# Patient Record
Sex: FEMALE | Race: WHITE | NOT HISPANIC OR LATINO | Employment: FULL TIME | URBAN - METROPOLITAN AREA
[De-identification: names, ages, dates, MRNs, and addresses within clinical notes are randomized per-mention and may not be internally consistent; named-entity substitution may affect disease eponyms.]

---

## 2017-11-14 ENCOUNTER — GENERIC CONVERSION - ENCOUNTER (OUTPATIENT)
Dept: OTHER | Facility: OTHER | Age: 42
End: 2017-11-14

## 2017-11-16 ENCOUNTER — ALLSCRIPTS OFFICE VISIT (OUTPATIENT)
Dept: OTHER | Facility: OTHER | Age: 42
End: 2017-11-16

## 2017-11-16 DIAGNOSIS — R10.13 EPIGASTRIC PAIN: ICD-10-CM

## 2017-11-16 DIAGNOSIS — M25.50 PAIN IN JOINT: ICD-10-CM

## 2017-11-16 DIAGNOSIS — O24.419 GESTATIONAL DIABETES MELLITUS IN PREGNANCY: ICD-10-CM

## 2017-11-16 DIAGNOSIS — K58.9 IRRITABLE BOWEL SYNDROME WITHOUT DIARRHEA: ICD-10-CM

## 2017-11-17 ENCOUNTER — APPOINTMENT (OUTPATIENT)
Dept: LAB | Facility: CLINIC | Age: 42
End: 2017-11-17
Payer: COMMERCIAL

## 2017-11-17 DIAGNOSIS — O24.419 GESTATIONAL DIABETES MELLITUS IN PREGNANCY: ICD-10-CM

## 2017-11-17 DIAGNOSIS — K58.9 IRRITABLE BOWEL SYNDROME WITHOUT DIARRHEA: ICD-10-CM

## 2017-11-17 DIAGNOSIS — M25.50 PAIN IN JOINT: ICD-10-CM

## 2017-11-17 LAB
ALBUMIN SERPL BCP-MCNC: 4 G/DL (ref 3.5–5)
ALP SERPL-CCNC: 74 U/L (ref 46–116)
ALT SERPL W P-5'-P-CCNC: 29 U/L (ref 12–78)
ANION GAP SERPL CALCULATED.3IONS-SCNC: 6 MMOL/L (ref 4–13)
AST SERPL W P-5'-P-CCNC: 17 U/L (ref 5–45)
BASOPHILS # BLD AUTO: 0.02 THOUSANDS/ΜL (ref 0–0.1)
BASOPHILS NFR BLD AUTO: 0 % (ref 0–1)
BILIRUB SERPL-MCNC: 0.46 MG/DL (ref 0.2–1)
BUN SERPL-MCNC: 8 MG/DL (ref 5–25)
CALCIUM SERPL-MCNC: 9.1 MG/DL (ref 8.3–10.1)
CHLORIDE SERPL-SCNC: 104 MMOL/L (ref 100–108)
CHOLEST SERPL-MCNC: 182 MG/DL (ref 50–200)
CO2 SERPL-SCNC: 28 MMOL/L (ref 21–32)
CREAT SERPL-MCNC: 0.78 MG/DL (ref 0.6–1.3)
EOSINOPHIL # BLD AUTO: 0.13 THOUSAND/ΜL (ref 0–0.61)
EOSINOPHIL NFR BLD AUTO: 2 % (ref 0–6)
ERYTHROCYTE [DISTWIDTH] IN BLOOD BY AUTOMATED COUNT: 13.3 % (ref 11.6–15.1)
ERYTHROCYTE [SEDIMENTATION RATE] IN BLOOD: 22 MM/HOUR (ref 0–20)
EST. AVERAGE GLUCOSE BLD GHB EST-MCNC: 126 MG/DL
GFR SERPL CREATININE-BSD FRML MDRD: 95 ML/MIN/1.73SQ M
GLUCOSE P FAST SERPL-MCNC: 83 MG/DL (ref 65–99)
HBA1C MFR BLD: 6 % (ref 4.2–6.3)
HCT VFR BLD AUTO: 39.8 % (ref 34.8–46.1)
HDLC SERPL-MCNC: 42 MG/DL (ref 40–60)
HGB BLD-MCNC: 13 G/DL (ref 11.5–15.4)
LDLC SERPL CALC-MCNC: 113 MG/DL (ref 0–100)
LYMPHOCYTES # BLD AUTO: 1.28 THOUSANDS/ΜL (ref 0.6–4.47)
LYMPHOCYTES NFR BLD AUTO: 15 % (ref 14–44)
MCH RBC QN AUTO: 28.3 PG (ref 26.8–34.3)
MCHC RBC AUTO-ENTMCNC: 32.7 G/DL (ref 31.4–37.4)
MCV RBC AUTO: 87 FL (ref 82–98)
MONOCYTES # BLD AUTO: 0.71 THOUSAND/ΜL (ref 0.17–1.22)
MONOCYTES NFR BLD AUTO: 8 % (ref 4–12)
NEUTROPHILS # BLD AUTO: 6.26 THOUSANDS/ΜL (ref 1.85–7.62)
NEUTS SEG NFR BLD AUTO: 75 % (ref 43–75)
NRBC BLD AUTO-RTO: 0 /100 WBCS
PLATELET # BLD AUTO: 338 THOUSANDS/UL (ref 149–390)
PMV BLD AUTO: 9.9 FL (ref 8.9–12.7)
POTASSIUM SERPL-SCNC: 4 MMOL/L (ref 3.5–5.3)
PROT SERPL-MCNC: 7.9 G/DL (ref 6.4–8.2)
RBC # BLD AUTO: 4.59 MILLION/UL (ref 3.81–5.12)
RHEUMATOID FACT SER QL LA: NEGATIVE
SODIUM SERPL-SCNC: 138 MMOL/L (ref 136–145)
TRIGL SERPL-MCNC: 133 MG/DL
WBC # BLD AUTO: 8.42 THOUSAND/UL (ref 4.31–10.16)

## 2017-11-17 PROCEDURE — 83036 HEMOGLOBIN GLYCOSYLATED A1C: CPT

## 2017-11-17 PROCEDURE — 36415 COLL VENOUS BLD VENIPUNCTURE: CPT

## 2017-11-17 PROCEDURE — 86618 LYME DISEASE ANTIBODY: CPT

## 2017-11-17 PROCEDURE — 85652 RBC SED RATE AUTOMATED: CPT

## 2017-11-17 PROCEDURE — 80053 COMPREHEN METABOLIC PANEL: CPT

## 2017-11-17 PROCEDURE — 80061 LIPID PANEL: CPT

## 2017-11-17 PROCEDURE — 86235 NUCLEAR ANTIGEN ANTIBODY: CPT

## 2017-11-17 PROCEDURE — 85025 COMPLETE CBC W/AUTO DIFF WBC: CPT

## 2017-11-17 PROCEDURE — 86430 RHEUMATOID FACTOR TEST QUAL: CPT

## 2017-11-18 LAB
ENA SS-A AB SER-ACNC: <0.2 AI (ref 0–0.9)
ENA SS-B AB SER-ACNC: <0.2 AI (ref 0–0.9)

## 2017-11-18 NOTE — PROGRESS NOTES
Assessment    1  Irritable bowel syndrome (564 1) (K58 9)   2  Polyarthralgia (719 49) (M25 50)   3  Pregnancy-induced diabetes (648 80) (O24 419)    Plan  Irritable bowel syndrome, Polyarthralgia    · Dicyclomine HCl - 10 MG Oral Capsule; TAKE 1 CAPSULE 3 TIMES DAILY   · Meloxicam 7 5 MG Oral Tablet; TAKE 1 TABLET TWICE DAILY   · (1) CBC/PLT/DIFF; Status:Active; Requested for:16Nov2017;    · (1) COMPREHENSIVE METABOLIC PANEL; Status:Active; Requested for:16Nov2017;   Irritable bowel syndrome, Polyarthralgia, Pregnancy-induced diabetes    · (1) HEMOGLOBIN A1C; Status:Active; Requested for:16Nov2017;    · (1) LIPID PANEL, FASTING; Status:Active; Requested for:16Nov2017;    · (1) LYME ANTIBODY PROFILE W/REFLEX TO WESTERN BLOT; Status:Active; Requestedfor:16Nov2017;    · (1) OCCULT BLOOD, FECAL IMMUNOCHEMICAL TEST; Status:Active; Requested for:16Nov2017;    · (1) RHEUMATOID FACTOR SCREEN; Status:Active; Requested for:16Nov2017;    · (1) SED RATE; Status:Active; Requested for:16Nov2017;    · (1) SJOGRENS ANTIBODIES; Status:Active; Requested for:16Nov2017;    · 1 - Александр ROSAS, Farzana Sánchez  (Gastroenterology) Co-Management  *  Status: Active  Requested DUL:[id number]  Care Summary provided  : Yes   · Follow-up visit in 2 weeks Evaluation and Treatment  Follow-up  Status: Hold For - Scheduling Requested for: [id number]    Discussion/Summary  Discussion Summary:   She has generalized arthritic changes  The changes are symptomatic  There is no overt arthritic changes  include fibromyalgia  She does have some muscular tenderness in various spots  did get relief from meloxicam before and will place be placed on meloxicam 7 5 b i d  her irritable bowel she will be started on Bentyl 10 milligrams 3 times a day  will have complete blood work done  will be referred to Gastroenterology and I will see her back here in 2 or 3 weeks after she has taken the above medicines        Chief Complaint  Chief Complaint Free Text Note Form: Problems are 1  Generalized musculoskeletal pain 2  Interval bowel syndrome      History of Present Illness  HPI: INC II years  She has been suffering from low back pain generalized aches and pains for some time now  Actually more than 1 year  It seems to be getting worse   has multiple joint involvement  There is some muscular component to it  2nd problem is abdominal pain  She has a long history of irritable bowel  She has seen a gastroenterologist in the past  She is not currently on anything  She has no melena or hematochezia  Review of Systems  Complete-Female:  Constitutional: feeling poorly-- and-- feeling tired, but-- as noted in HPI,-- no fever-- and-- no chills  Eyes: She wears, but-- No complaints of eye pain, no red eyes, no eyesight problems, no discharge, no dry eyes, no itching of eyes  ENT: no complaints of earache, no loss of hearing, no nose bleeds, no nasal discharge, no sore throat, no hoarseness  Cardiovascular: No complaints of slow heart rate, no fast heart rate, no chest pain, no palpitations, no leg claudication, no lower extremity edema  Respiratory: No complaints of shortness of breath, no wheezing, no cough, no SOB on exertion, no orthopnea, no PND  Gastrointestinal: abdominal pain,-- constipation,-- diarrhea-- and-- She has abdominal cramping  She has constipation alternating with diarrhea , but-- as noted in HPI,-- no vomiting-- and-- no blood in stools  Genitourinary: No complaints of dysuria, no incontinence, no pelvic pain, no dysmenorrhea, no vaginal discharge or bleeding  Musculoskeletal: joint swelling,-- myalgias-- and-- joint stiffness  Integumentary: No complaints of skin rash or lesions, no itching, no skin wounds, no breast pain or lump  Neurological: No complaints of headache, no confusion, no convulsions, no numbness, no dizziness or fainting, no tingling, no limb weakness, no difficulty walking    Psychiatric: Not suicidal, no sleep disturbance, no anxiety or depression, no change in personality, no emotional problems  Endocrine: No complaints of proptosis, no hot flashes, no muscle weakness, no deepening of the voice, no feelings of weakness  Hematologic/Lymphatic: No complaints of swollen glands, no swollen glands in the neck, does not bleed easily, does not bruise easily  Active Problems    1  Acute sinusitis (461 9) (J01 90)   2  Elderly multigravida (659 60) (O09 529)   3  Maternal morbid obesity, antepartum (649 13,278 01) (O99 210,E66 01)   4  Need for tuberculosis vaccination (V03 2) (Z23)   5  Pregnancy-induced diabetes (648 80) (O24 419)   6  Previous  delivery affecting pregnancy, antepartum (562 35) (O34 219)    Past Medical History  Active Problems And Past Medical History Reviewed: The active problems and past medical history were reviewed and updated today  Surgical History  Surgical History Reviewed: The surgical history was reviewed and updated today  Family History  Mother    1  Family history of arthritis (V17 7) (Z82 61)   2  Family history of fibromyalgia (V17 89) (Z82 69)  Father    3  Family history of hypertension (V17 49) (Z82 49)   4  Family history of type 2 diabetes mellitus (V18 0) (Z83 3)  Family History Reviewed: The family history was reviewed and updated today  Social History     · Alcohol use   · Never a smoker   · No drug use  Social History Reviewed: The social history was reviewed and updated today  The social history was reviewed and is unchanged  Current Meds   1  Singulair 10 MG Oral Tablet; TAKE 1 TABLET DAILY; Therapy: (Recorded:2015) to Recorded   2  ZyrTEC Allergy 10 MG Oral Tablet; TAKE 1 TABLET AT BEDTIME; Therapy: (YFYCZIYU:[id number]) to Recorded  Medication List Reviewed: The medication list was reviewed and updated today  Allergies  1  Ortho Tri-Cyclen (21) TABS  2  Animal dander - Dogs   3  Dust   4  Nuts   5   Seasonal    Vitals  Vital Signs    Recorded: [id number] 06: 34PM   Heart Rate 72   Systolic 969   Diastolic 90   Height 5 ft 5 in   Weight 240 lb    BMI Calculated 39 94   BSA Calculated 2 14       Physical Exam   Constitutional  General appearance: Abnormal  -- Overweight female  Blood pressure is 120/74  Eyes  Conjunctiva and lids: No swelling, erythema or discharge  Pupils and irises: Equal, round and reactive to light  Ears, Nose, Mouth, and Throat  Otoscopic examination: Tympanic membranes translucent with normal light reflex  Canals patent without erythema  Nasal mucosa, septum, and turbinates: Normal without edema or erythema  Oropharynx: Normal with no erythema, edema, exudate or lesions  Pulmonary  Respiratory effort: No increased work of breathing or signs of respiratory distress  Auscultation of lungs: Clear to auscultation  Cardiovascular  Auscultation of heart: Normal rate and rhythm, normal S1 and S2, without murmurs  Examination of extremities for edema and/or varicosities: Normal    Abdomen  Abdomen: Abnormal    Liver and spleen: No hepatomegaly or splenomegaly  Lymphatic  Palpation of lymph nodes in neck: No lymphadenopathy  Musculoskeletal  Gait and station: Normal    Inspection/palpation of joints, bones, and muscles: Abnormal  -- She has no generalized synovitis is noted  She had as aches and pains but no erythema  Skin  Skin and subcutaneous tissue: Normal without rashes or lesions  Neurologic  Cranial nerves: Cranial nerves 2-12 intact     Psychiatric  Orientation to person, place, and time: Normal          Future Appointments    Date/Time Provider Specialty Site   12/05/2017 03:30 PM Sunday Ledesma DO Internal Medicine [facility]       Signatures   Electronically signed by : Berna Michelle DO; Nov 17 2017  7:26AM EST                       (Author)

## 2017-11-20 LAB
B BURGDOR IGG SER IA-ACNC: 0.11
B BURGDOR IGM SER IA-ACNC: 0.2

## 2017-11-21 ENCOUNTER — TRANSCRIBE ORDERS (OUTPATIENT)
Dept: BONE DENSITY | Facility: CLINIC | Age: 42
End: 2017-11-21

## 2017-11-21 ENCOUNTER — GENERIC CONVERSION - ENCOUNTER (OUTPATIENT)
Dept: OTHER | Facility: OTHER | Age: 42
End: 2017-11-21

## 2017-11-21 ENCOUNTER — APPOINTMENT (OUTPATIENT)
Dept: LAB | Facility: CLINIC | Age: 42
End: 2017-11-21
Payer: COMMERCIAL

## 2017-11-21 DIAGNOSIS — R10.13 EPIGASTRIC PAIN: ICD-10-CM

## 2017-11-21 LAB
ALBUMIN SERPL BCP-MCNC: 3.8 G/DL (ref 3.5–5)
ALP SERPL-CCNC: 80 U/L (ref 46–116)
ALT SERPL W P-5'-P-CCNC: 24 U/L (ref 12–78)
AST SERPL W P-5'-P-CCNC: 13 U/L (ref 5–45)
BILIRUB DIRECT SERPL-MCNC: 0.08 MG/DL (ref 0–0.2)
BILIRUB SERPL-MCNC: 0.29 MG/DL (ref 0.2–1)
PROT SERPL-MCNC: 7.7 G/DL (ref 6.4–8.2)

## 2017-11-21 PROCEDURE — 80076 HEPATIC FUNCTION PANEL: CPT

## 2017-11-21 PROCEDURE — 36415 COLL VENOUS BLD VENIPUNCTURE: CPT

## 2017-11-22 RX ORDER — CETIRIZINE HYDROCHLORIDE 10 MG/1
10 TABLET ORAL DAILY
COMMUNITY
End: 2018-03-29 | Stop reason: SDUPTHER

## 2017-11-22 RX ORDER — DICYCLOMINE HYDROCHLORIDE 10 MG/1
10 CAPSULE ORAL
Status: ON HOLD | COMMUNITY
End: 2018-02-09 | Stop reason: ALTCHOICE

## 2017-11-22 RX ORDER — AMOXICILLIN 500 MG/1
500 CAPSULE ORAL
Status: ON HOLD | COMMUNITY
End: 2018-02-09 | Stop reason: ALTCHOICE

## 2017-11-22 RX ORDER — MONTELUKAST SODIUM 10 MG/1
10 TABLET ORAL
COMMUNITY
End: 2020-12-29 | Stop reason: SDUPTHER

## 2017-11-22 RX ORDER — MELOXICAM 7.5 MG/1
7.5 TABLET ORAL 2 TIMES DAILY
Status: ON HOLD | COMMUNITY
End: 2018-02-09 | Stop reason: ALTCHOICE

## 2017-11-29 ENCOUNTER — ANESTHESIA EVENT (OUTPATIENT)
Dept: PERIOP | Facility: HOSPITAL | Age: 42
End: 2017-11-29
Payer: COMMERCIAL

## 2017-11-29 RX ORDER — COVID-19 ANTIGEN TEST
220 KIT MISCELLANEOUS
COMMUNITY
End: 2018-03-29 | Stop reason: ALTCHOICE

## 2017-11-29 RX ORDER — ACETAMINOPHEN 325 MG/1
325 TABLET ORAL EVERY 6 HOURS PRN
COMMUNITY

## 2017-11-29 NOTE — ANESTHESIA PREPROCEDURE EVALUATION
Review of Systems/Medical History  Patient summary reviewed  Chart reviewed  No history of anesthetic complications     Cardiovascular  Exercise tolerance: good,     Pulmonary  Not a smoker , ,        GI/Hepatic      Comment: Abdominal bloating, IBS          Endo/Other     GYN       Hematology   Musculoskeletal  Obesity ,        Neurology   Psychology           Physical Exam    Airway    Mallampati score: II  TM Distance: >3 FB  Neck ROM: full     Dental   Comment: #12 root canal (upper left side), No notable dental hx     Cardiovascular  Cardiovascular exam normal    Pulmonary  Pulmonary exam normal     Other Findings        Anesthesia Plan  ASA Score- 2       Anesthesia Type- IV sedation with anesthesia with ASA Monitors  Additional Monitors:   Airway Plan:     Comment: I, Dr Booker Murray, the attending physician, has personally seen and evaluated the patient prior to anesthetic care  I have reviewed the pre-anesthetic record, and other medical records if appropriate to the anesthetic care  Risks and benefits discussed with patient; patient consented and agrees to proceed  If a CRNA is involved in the case, I have reviewed the CRNA assessment, if present, and agree  The patient is in a suitable condition to proceed with my formulated anesthetic plan          Induction- intravenous  Informed Consent- Anesthetic plan and risks discussed with patient  I personally reviewed this patient with the CRNA  Discussed and agreed on the Anesthesia Plan with the CRNA  David Owen

## 2017-11-30 ENCOUNTER — HOSPITAL ENCOUNTER (OUTPATIENT)
Facility: HOSPITAL | Age: 42
Setting detail: OUTPATIENT SURGERY
Discharge: HOME | End: 2017-11-30
Attending: INTERNAL MEDICINE | Admitting: INTERNAL MEDICINE
Payer: COMMERCIAL

## 2017-11-30 ENCOUNTER — ANESTHESIA (OUTPATIENT)
Dept: PERIOP | Facility: HOSPITAL | Age: 42
End: 2017-11-30
Payer: COMMERCIAL

## 2017-11-30 ENCOUNTER — GENERIC CONVERSION - ENCOUNTER (OUTPATIENT)
Dept: OTHER | Facility: OTHER | Age: 42
End: 2017-11-30

## 2017-11-30 VITALS
HEART RATE: 71 BPM | OXYGEN SATURATION: 98 % | BODY MASS INDEX: 39.49 KG/M2 | DIASTOLIC BLOOD PRESSURE: 92 MMHG | RESPIRATION RATE: 20 BRPM | WEIGHT: 237 LBS | TEMPERATURE: 97 F | HEIGHT: 65 IN | SYSTOLIC BLOOD PRESSURE: 127 MMHG

## 2017-11-30 DIAGNOSIS — R14.0 ABDOMINAL BLOATING: ICD-10-CM

## 2017-11-30 DIAGNOSIS — R19.5 LOOSE STOOLS: ICD-10-CM

## 2017-11-30 DIAGNOSIS — R10.13 ABDOMINAL DISCOMFORT, EPIGASTRIC: ICD-10-CM

## 2017-11-30 LAB — EXT PREGNANCY TEST URINE: NEGATIVE

## 2017-11-30 PROCEDURE — 88342 IMHCHEM/IMCYTCHM 1ST ANTB: CPT | Performed by: INTERNAL MEDICINE

## 2017-11-30 PROCEDURE — 88305 TISSUE EXAM BY PATHOLOGIST: CPT | Performed by: INTERNAL MEDICINE

## 2017-11-30 PROCEDURE — 81025 URINE PREGNANCY TEST: CPT | Performed by: ANESTHESIOLOGY

## 2017-11-30 RX ORDER — SODIUM CHLORIDE, SODIUM LACTATE, POTASSIUM CHLORIDE, CALCIUM CHLORIDE 600; 310; 30; 20 MG/100ML; MG/100ML; MG/100ML; MG/100ML
75 INJECTION, SOLUTION INTRAVENOUS CONTINUOUS
Status: DISCONTINUED | OUTPATIENT
Start: 2017-11-30 | End: 2017-11-30 | Stop reason: HOSPADM

## 2017-11-30 RX ORDER — SODIUM CHLORIDE 9 MG/ML
20 INJECTION, SOLUTION INTRAVENOUS CONTINUOUS
Status: DISCONTINUED | OUTPATIENT
Start: 2017-11-30 | End: 2017-11-30

## 2017-11-30 RX ORDER — SODIUM CHLORIDE 9 MG/ML
125 INJECTION, SOLUTION INTRAVENOUS CONTINUOUS
Status: DISCONTINUED | OUTPATIENT
Start: 2017-11-30 | End: 2017-11-30

## 2017-11-30 RX ORDER — PROPOFOL 10 MG/ML
INJECTION, EMULSION INTRAVENOUS AS NEEDED
Status: DISCONTINUED | OUTPATIENT
Start: 2017-11-30 | End: 2017-11-30 | Stop reason: SURG

## 2017-11-30 RX ADMIN — PROPOFOL 50 MG: 10 INJECTION, EMULSION INTRAVENOUS at 08:30

## 2017-11-30 RX ADMIN — PROPOFOL 50 MG: 10 INJECTION, EMULSION INTRAVENOUS at 08:27

## 2017-11-30 RX ADMIN — PROPOFOL 120 MG: 10 INJECTION, EMULSION INTRAVENOUS at 08:24

## 2017-11-30 RX ADMIN — SODIUM CHLORIDE, SODIUM LACTATE, POTASSIUM CHLORIDE, AND CALCIUM CHLORIDE 75 ML/HR: .6; .31; .03; .02 INJECTION, SOLUTION INTRAVENOUS at 07:39

## 2017-11-30 RX ADMIN — PROPOFOL 20 MG: 10 INJECTION, EMULSION INTRAVENOUS at 08:35

## 2017-11-30 RX ADMIN — PROPOFOL 20 MG: 10 INJECTION, EMULSION INTRAVENOUS at 08:41

## 2017-11-30 NOTE — OP NOTE
**** GI/ENDOSCOPY REPORT ****     PATIENT NAME: Verner Friar - VISIT ID:  Patient ID:   [id number] YOB: 1975     INTRODUCTION: Esophagogastroduodenoscopy - A 40 female patient presents   for an outpatient Esophagogastroduodenoscopy at [facility]  INDICATIONS: Pain located in the epigastrium  CONSENT: The benefits, risks, and alternatives to the procedure were   discussed and informed consent was obtained from the patient  PREPARATION:  EKG, pulse, pulse oximetry and blood pressure were monitored   throughout the procedure  MEDICATIONS:asa 2     PROCEDURE:  The endoscope was passed without difficulty through the mouth   under direct visualization and advanced to the 3rd portion of the   duodenum  The scope was withdrawn and the mucosa was carefully examined  FINDINGS:   Esophagus: The esophagus appeared to be normal  The Z line was   visualized at 35 cm from the entry site  There was a 3 cm hiatus hernia   visible in the esophagus  Stomach: Mild erosive gastritis was found in   the antrum  A biopsy was taken  To control bleeding from the fundal   biopsies, which bled after biopsy, 2 clips was applied, with success  The   body of the stomach, cardia, fundus, incisura, and pylorus appeared to be   normal  A biopsy was taken from the body of the stomach and fundus  Duodenum: The duodenal bulb, 2nd portion of the duodenum, and 3rd portion   of the duodenum appeared to be normal      COMPLICATIONS: There were no complications  IMPRESSIONS: Normal esophagus  Z line visualized  A hiatus hernia found  Mild erosive gastritis found in the antrum  Biopsy taken  Clips were   applied to control bleeding  Normal body of the stomach, cardia, fundus,   incisura, and pylorus  Biopsy taken  Normal duodenal bulb, 2nd portion of   the duodenum, and 3rd portion of the duodenum  RECOMMENDATIONS: Follow-up on the results of the biopsy specimens in 1   week  Begin medication as prescribed  Begin taking Protonix 40 mg PO every   day  ESTIMATED BLOOD LOSS: Insignificant  PATHOLOGY SPECIMENS: Biopsy taken  Associated finding: Gastritis  Random   biopsy taken from the body of the stomach and fundus  PROCEDURE CODES: 20246 - EGD flexible; with biopsy 26370 - EGD flexible;   with control of bleeding     ICD-9 Codes: 789 06 Abdominal pain, epigastric 553 3 Diaphragmatic hernia   without mention of obstruction or gangrene 535 40 Other specified   gastritides, without mention of hemorrhage     ICD-10 Codes: R10 13 Epigastric pain K44 Diaphragmatic hernia K29   Gastritis and duodenitis     PERFORMED BY: LAURO Mills  on 11/30/2017  Version 1, electronically signed by LAURO Ulloa , D O  on   11/30/2017 at 08:37

## 2017-11-30 NOTE — OP NOTE
**** GI/ENDOSCOPY REPORT ****     PATIENT NAME: Kaiser Conti ------ VISIT ID:  Patient ID:   [id number] YOB: 1975     INTRODUCTION: Colonoscopy - A 40 female patient presents for an outpatient   Colonoscopy at [address] [place] [address]  PREVIOUS COLONOSCOPY: Patient's last colonoscopy was 6 years ago  INDICATIONS: Diarrhea  Hx polyps     CONSENT:  The benefits, risks, and alternatives to the procedure were   discussed and informed consent was obtained from the patient  PREPARATION: EKG, pulse, pulse oximetry and blood pressure were monitored   throughout the procedure  The patient was identified by myself both   verbally and by visual inspection of ID band  Airway Assessment   Classification: Airway class 2 - Visualization of the soft palate, fauces   and uvula  ASA Classification: Class 2 - Patient has mild to moderate   systemic disturbance that may or may not be related to the disorder   requiring surgery  MEDICATIONS: Anesthesia-check records     PROCEDURE:  The endoscope was passed with ease through the anus under   direct visualization and advanced to the terminal ileum, confirmed by   appendiceal orifice, cecal strap (crow's foot), and ileocecal valve  The   scope was withdrawn and the mucosa was carefully examined  The quality of   the preparation was excellent  Cecal Intubation Time: 2 minutes(s) Scope   Withdrawal Time: 5 minutes(s)     RECTAL EXAM: Normal rectal exam      FINDINGS:  The terminal ileum appeared to be normal  A biopsy was taken   from the terminal ileum  Possibly erythematous mucosa was found in the   descending colon and sigmoid colon  Otherwise, the colon appeared to be   normal  A biopsy was taken from the ascending colon, descending colon, and   sigmoid colon  Normal retroversion     COMPLICATIONS: There were no complications  IMPRESSIONS: Normal terminal ileum  Biopsy taken   Possibly erythematous   mucosa found in the descending colon and sigmoid colon  RECOMMENDATIONS: Follow-up on the results of the biopsy specimens  Colonoscopy recommended in 5 years  ESTIMATED BLOOD LOSS: Insignificant  PATHOLOGY SPECIMENS: Random biopsy taken from the terminal ileum  Random   biopsy taken from the ascending colon, descending colon, and sigmoid colon  PROCEDURE CODES: Colonoscopy with biopsy     ICD-9 Codes: 787 91 Diarrhea     ICD-10 Codes: R19 7 Diarrhea, unspecified R19 8 Other specified symptoms   and signs involving the digestive system and abdomen     PERFORMED BY: LAURO Mathis  on 11/30/2017  Version 1, electronically signed by LAURO Robles , D O  on   11/30/2017 at 08:51

## 2017-11-30 NOTE — DISCHARGE INSTRUCTIONS
Colonoscopy   WHAT YOU NEED TO KNOW:   A colonoscopy is a procedure to examine the inside of your colon (intestine) with a scope  Polyps or tissue growths may have been removed during your colonoscopy  It is normal to feel bloated and to have some abdominal discomfort  You should be passing gas  If you have hemorrhoids or you had polyps removed, you may have a small amount of bleeding  DISCHARGE INSTRUCTIONS:   Seek care immediately if:   · You have a large amount of bright red blood in your bowel movements  · Your abdomen is hard and firm and you have severe pain  · You have sudden trouble breathing  Contact your healthcare provider if:   · You develop a rash or hives  · You have a fever within 24 hours of your procedure  · You have not had a bowel movement for 3 days after your procedure  · You have questions or concerns about your condition or care  Activity:   · Do not lift, strain, or run  for 3 days after your procedure  · Rest after your procedure  You have been given medicine to relax you  Do not  drive or make important decisions until the day after your procedure  Return to your normal activity as directed  · Relieve gas and discomfort from bloating  by lying on your right side with a heating pad on your abdomen  You may need to take short walks to help the gas move out  Eat small meals until bloating is relieved  If you had polyps removed: For 7 days after your procedure:  · Do not  take aspirin  · Do not  go on long car rides  Help prevent constipation:   · Eat a variety of healthy foods  Healthy foods include fruit, vegetables, whole-grain breads, low-fat dairy products, beans, lean meat, and fish  Ask if you need to be on a special diet  Your healthcare provider may recommend that you eat high-fiber foods such as cooked beans  Fiber helps you have regular bowel movements  · Drink liquids as directed    Adults should drink between 9 and 13 eight-ounce cups of liquid every day  Ask what amount is best for you  For most people, good liquids to drink are water, juice, and milk  · Exercise as directed  Talk to your healthcare provider about the best exercise plan for you  Exercise can help prevent constipation, decrease your blood pressure and improve your health  Follow up with your healthcare provider as directed:  Write down your questions so you remember to ask them during your visits  © 2017 [address] [employer] Information is for End User's use only and may not be sold, redistributed or otherwise used for commercial purposes  All illustrations and images included in CareNotes® are the copyrighted property of [employer]  or [employer]  The above information is an  only  It is not intended as medical advice for individual conditions or treatments  Talk to your doctor, nurse or pharmacist before following any medical regimen to see if it is safe and effective for you  Upper Endoscopy   WHAT YOU NEED TO KNOW:   An upper endoscopy is also called an upper gastrointestinal (GI) endoscopy, or an esophagogastroduodenoscopy (EGD)  You may feel bloated, gassy, or have some abdominal discomfort after your procedure  Your throat may be sore for 24 to 36 hours  You may burp or pass gas from air that is still inside your body  DISCHARGE INSTRUCTIONS:   Call 911 for any of the following:   · You have sudden chest pain or trouble breathing  Seek care immediately if:   · You feel dizzy or faint  · You have trouble swallowing  · Your bowel movements are very dark or black  · Your abdomen is hard and firm and you have severe pain  · You vomit blood  Contact your healthcare provider if:   · You feel full or bloated and cannot burp or pass gas  · You have not had a bowel movement for 3 days after your procedure  · You have neck pain  · You have a fever or chills  · You have nausea or are vomiting      · You

## 2017-11-30 NOTE — ANESTHESIA POSTPROCEDURE EVALUATION
Post-Op Assessment Note      CV Status:  Stable    Mental Status:  Awake    Hydration Status:  Stable    PONV Controlled:  None    Airway Patency:  Patent and adequate    Post Op Vitals Reviewed: Yes          Staff: Anesthesiologist, CRNA           BP   121/80   Temp     Pulse  72   Resp   18   SpO2   99%

## 2017-12-05 ENCOUNTER — GENERIC CONVERSION - ENCOUNTER (OUTPATIENT)
Dept: OTHER | Facility: OTHER | Age: 42
End: 2017-12-05

## 2017-12-06 ENCOUNTER — GENERIC CONVERSION - ENCOUNTER (OUTPATIENT)
Dept: OTHER | Facility: OTHER | Age: 42
End: 2017-12-06

## 2017-12-06 ENCOUNTER — HOSPITAL ENCOUNTER (OUTPATIENT)
Dept: ULTRASOUND IMAGING | Facility: HOSPITAL | Age: 42
Discharge: HOME | End: 2017-12-06
Payer: COMMERCIAL

## 2017-12-06 DIAGNOSIS — R10.13 EPIGASTRIC PAIN: ICD-10-CM

## 2017-12-06 PROCEDURE — 76705 ECHO EXAM OF ABDOMEN: CPT

## 2017-12-28 ENCOUNTER — GENERIC CONVERSION - ENCOUNTER (OUTPATIENT)
Dept: OTHER | Facility: OTHER | Age: 42
End: 2017-12-28

## 2018-01-02 ENCOUNTER — GENERIC CONVERSION - ENCOUNTER (OUTPATIENT)
Dept: OTHER | Facility: OTHER | Age: 43
End: 2018-01-02

## 2018-01-15 VITALS
SYSTOLIC BLOOD PRESSURE: 120 MMHG | WEIGHT: 240 LBS | DIASTOLIC BLOOD PRESSURE: 90 MMHG | HEIGHT: 65 IN | HEART RATE: 72 BPM | BODY MASS INDEX: 39.99 KG/M2

## 2018-01-18 ENCOUNTER — ALLSCRIPTS OFFICE VISIT (OUTPATIENT)
Dept: OTHER | Facility: OTHER | Age: 43
End: 2018-01-18

## 2018-01-18 ENCOUNTER — GENERIC CONVERSION - ENCOUNTER (OUTPATIENT)
Dept: OTHER | Facility: OTHER | Age: 43
End: 2018-01-18

## 2018-01-19 NOTE — CONSULTS
Assessment   1  Chronic calculous cholecystitis (574 10) (K80 10)    Plan   Chronic calculous cholecystitis    · Schedule Surgery Treatment  Procedure  Status: Hold For - Scheduling  Requested for:    [id number]   Ordered; For: Chronic calculous cholecystitis; Ordered By: Alda Copeland Performed:  Due: 40JNJ3975    Discussion/Summary   Discussion Summary:    [de-identified] year female with a past medical history significant for morbid obesity, seasonal allergies, IV as who has symptomatic gallstones  I advised the patient to undergo laparoscopic cholecystectomy with IOC, possible open in the near future  I discussed the operative procedure, risks, benefits and alternatives with the patient, she understood and agreed to proceed  Medication SE Review and Pt Understands Tx: The treatment plan was reviewed with the patient/guardian  The patient/guardian understands and agrees with the treatment plan      Chief Complaint   Chief Complaint Free Text Note Form: Consult gallbladder      History of Present Illness   HPI: I had the pleasure of seeing Sidney Dasilva in the office today for evaluation of symptomatic gallstones  The patient is a pleasant 40 year female has been dealing with episodes of epigastric and lower chest pain for quite some time  The pain sometimes radiates to the right upper quadrant and into her back, associated with constant nausea without vomiting  The last attack was in new year's Day  She denies having any chills, fever, change in the color of the urine or stool  The patient went to see her primary care physician referred to GI  The patient had an ultrasound of the gallbladder on December 6 which shows multiple gallstones, normal common bile duct size  There was no evidence of common bile duct stone  I reviewed the EGD and colonoscopy report  I reviewed the liver function tests from November 21st 2017  Review of Systems   Complete-Female:      Constitutional: no fever-- and-- no chills  Eyes: no eye pain-- and-- no purulent discharge from the eyes  ENT: no earache-- and-- no nosebleeds  Cardiovascular: no chest pain-- and-- no palpitations  Respiratory: no shortness of breath,-- no cough-- and-- no wheezing  Gastrointestinal: as noted in HPI  Genitourinary: no dysuria-- and-- no incontinence  Musculoskeletal: arthralgias-- and-- myalgias  Integumentary: no rashes-- and-- no skin lesions  Neurological: no headache-- and-- no convulsions  Psychiatric: no anxiety-- and-- no depression  Hematologic/Lymphatic: no swollen glands-- and-- no tendency for easy bleeding  ROS Reviewed:    ROS reviewed  Active Problems   1  Abdominal bloating (787 3) (R14 0)   2  Abdominal discomfort, epigastric (789 06) (R10 13)   3  Abdominal discomfort, generalized (789 07) (R10 84)   4  Acute maxillary sinusitis (461 0) (J01 00)   5  Acute sinusitis (461 9) (J01 90)   6  Elderly multigravida (659 60) (O09 529)   7  Erosive gastritis (535 40) (K29 60)   8  Gallstones (574 20) (K80 20)   9  Irritable bowel syndrome (564 1) (K58 9)   10  Loose stools (787 7) (R19 5)   11  Maternal morbid obesity, antepartum (649 13,278 01) (O99 210,E66 01)   12  Need for tuberculosis vaccination (V03 2) (Z23)   13  Polyarthralgia (719 49) (M25 50)   14  Pre-diabetes (790 29) (R73 03)   15  Pregnancy-induced diabetes (648 80) (O24 419)   16  Previous  delivery affecting pregnancy, antepartum (525 10) (O34 219)    Past Medical History   Active Problems And Past Medical History Reviewed: The active problems and past medical history were reviewed and updated today  Surgical History   Surgical History Reviewed: The surgical history was reviewed and updated today  Family History   Mother    1  Family history of arthritis (V17 7) (Z82 61)   2  Family history of fibromyalgia (V17 89) (Z82 69)  Father    3  Family history of hypertension (V17 49) (Z82 49)   4   Family history of type 2 diabetes mellitus (V18 0) (Z83 3)    Social History    · Alcohol use   · Never a smoker   · No drug use  Social History Reviewed: The social history was reviewed and updated today  The social history was reviewed and is unchanged  Current Meds    1  Dicyclomine HCl - 10 MG Oral Capsule; TAKE 1 CAPSULE 3 TIMES DAILY; Therapy: 73DIX0436 to (Evaluate:11Nov2018)  Requested for: 41BBA6116; Last     Rx:16Nov2017 Ordered   2  DrRx Zithromax Z-Vinod 250 MG #6 pill pack; uri; Therapy: 34ILH0524 to (Last Rx:61Ysx2479) Ordered   3  Meloxicam 7 5 MG Oral Tablet; TAKE 1 TABLET TWICE DAILY; Therapy: 21CUY1156 to (Evaluate:15Jan2018)  Requested for: 27HWZ7350; Last     Rx:16Nov2017 Ordered   4  Pantoprazole Sodium 40 MG Oral Tablet Delayed Release; take 1 tablet by mouth every     day; Therapy: 09AOU5570 to ([phone])  Requested for: 20FTT4445; Last     Rx:30Nov2017 Ordered   5  Singulair 10 MG Oral Tablet; TAKE 1 TABLET DAILY; Therapy: (FTLKMSCH:17VWJ8281) to Recorded   6  ZyrTEC Allergy 10 MG Oral Tablet; TAKE 1 TABLET AT BEDTIME; Therapy: (DCIXOEJR:83NQI2341) to Recorded  Medication List Reviewed: The medication list was reviewed and updated today  Allergies   1  Ortho Tri-Cyclen (21) TABS  2  Animal dander - Dogs   3  Dust   4  Nuts   5  Seasonal    Vitals   Vital Signs    Recorded: 84AOA5285 01:46PM   Temperature 58 4 F   Systolic 041, LUE, Sitting   Diastolic 80, LUE, Sitting   Weight 237 lb    BMI Calculated 39 44   BSA Calculated 2 13     Physical Exam        Constitutional      General appearance: No acute distress, well appearing and well nourished  -- Morbidly obese  Eyes      Conjunctiva and lids: No swelling, erythema or discharge  Pupils and irises: Equal, round and reactive to light  Sclera non-icteric         Ears, Nose, Mouth, and Throat      External inspection of ears and nose: Normal        Oropharynx: Normal with no erythema, edema, exudate or lesions  Neck      Supple, symmetric, trachea midline, no masses      Pulmonary      Respiratory effort: No increased work of breathing or signs of respiratory distress  Auscultation of lungs: Clear to auscultation, equal breath sounds bilaterally, no wheezes, no rales, no rhonci  Cardiovascular      Auscultation of heart: Normal rate and rhythm, normal S1 and S2, without murmurs  Abdomen      Abdomen: Abnormal  -- The abdomen is soft, nondistended, mild to moderate upper abdominal tenderness without guarding or rebound  There is a Pfannenstiel surgical scar  Liver and spleen: No hepatomegaly or splenomegaly  Lymphatic      Palpation of lymph nodes in neck: No lymphadenopathy  Skin      Skin and subcutaneous tissue: Normal without rashes or lesions  Neurologic      Cranial nerves: Cranial nerves 2-12 intact  Sensation: Motor and sensory grossly intact         Psychiatric      Orientation to person, place, and time: Normal        Mood and affect: Normal        Future Appointments      Date/Time Provider Specialty Site   01/18/2018 04:30 PM Shahab Olivera DO Internal Medicine [address] [facility]     Signatures    Electronically signed by : Nini Gao MD; Jan 18 2018  2:12PM EST                       (Author)

## 2018-01-22 VITALS
BODY MASS INDEX: 40.24 KG/M2 | HEART RATE: 70 BPM | DIASTOLIC BLOOD PRESSURE: 80 MMHG | WEIGHT: 241.5 LBS | HEIGHT: 65 IN | SYSTOLIC BLOOD PRESSURE: 115 MMHG

## 2018-01-23 VITALS
DIASTOLIC BLOOD PRESSURE: 80 MMHG | TEMPERATURE: 98.3 F | BODY MASS INDEX: 39.44 KG/M2 | WEIGHT: 237 LBS | SYSTOLIC BLOOD PRESSURE: 108 MMHG

## 2018-01-23 NOTE — RESULT NOTES
Verified Results  [address] [place] [address] [id number] [address] Order Number: [id number]    - Patient Instructions: To schedule this appointment, please contact Central Scheduling at [phone]  Test Name Result Flag Reference   US ABDOMEN LIMITED (Report)     RIGHT UPPER QUADRANT ULTRASOUND     INDICATION: Epigastric pain     COMPARISON: None  TECHNIQUE:  Real-time ultrasound of the right upper quadrant was performed with a curvilinear transducer with both volumetric sweeps and still imaging techniques  FINDINGS:     PANCREAS: Visualized portions of the pancreas are within normal limits  AORTA AND IVC: Visualized portions are normal for patient age  LIVER:   Size: Within normal range  The liver measures 15 1 cm in the midclavicular line  Contour: Surface contour is smooth  Parenchyma: There is moderate diffuse increased echogenicity with smooth echotexture and acoustic beam attenuation  Most consistent with moderate hepatic steatosis  No evidence of suspicious mass  Limited imaging of the main portal vein shows it to be patent and hepatopetal       BILIARY:   The gallbladder is normal in caliber  No wall thickening or pericholecystic fluid  Multiple mobile dependent calculi  No sludge  No sonographic Hernandez's sign  No intrahepatic biliary dilatation  CBD measures 4 mm  No choledocholithiasis  KIDNEY:    Right kidney is normal size  Within normal limits  ASCITES:  None  IMPRESSION:       1  Cholelithiasis  2  Hepatic steatosis         Workstation performed: [id number]     Signed by:   Jared Jamison MD   12/6/17

## 2018-01-24 VITALS
SYSTOLIC BLOOD PRESSURE: 115 MMHG | DIASTOLIC BLOOD PRESSURE: 80 MMHG | HEIGHT: 65 IN | WEIGHT: 235.13 LBS | HEART RATE: 72 BPM | BODY MASS INDEX: 39.17 KG/M2

## 2018-01-24 VITALS
DIASTOLIC BLOOD PRESSURE: 88 MMHG | SYSTOLIC BLOOD PRESSURE: 122 MMHG | BODY MASS INDEX: 39.57 KG/M2 | HEIGHT: 65 IN | OXYGEN SATURATION: 98 % | HEART RATE: 86 BPM | WEIGHT: 237.5 LBS

## 2018-01-24 VITALS
OXYGEN SATURATION: 99 % | WEIGHT: 239 LBS | DIASTOLIC BLOOD PRESSURE: 80 MMHG | SYSTOLIC BLOOD PRESSURE: 122 MMHG | HEART RATE: 79 BPM | BODY MASS INDEX: 39.82 KG/M2 | HEIGHT: 65 IN

## 2018-01-24 VITALS
HEART RATE: 82 BPM | SYSTOLIC BLOOD PRESSURE: 110 MMHG | OXYGEN SATURATION: 96 % | DIASTOLIC BLOOD PRESSURE: 78 MMHG | WEIGHT: 238 LBS | BODY MASS INDEX: 39.65 KG/M2 | HEIGHT: 65 IN

## 2018-01-31 PROBLEM — K80.10 CHRONIC CALCULOUS CHOLECYSTITIS: Status: ACTIVE | Noted: 2018-01-31

## 2018-02-07 NOTE — PRE-PROCEDURE INSTRUCTIONS
Pre-Surgery Instructions:   Medication Instructions    cetirizine (ZyrTEC) 10 mg tablet Patient was instructed by Physician and understands   montelukast (SINGULAIR) 10 mg tablet Patient was instructed by Physician and understands   Naproxen Sodium (ALEVE) 220 MG CAPS Patient was instructed by Physician and understands

## 2018-02-08 ENCOUNTER — ANESTHESIA EVENT (OUTPATIENT)
Dept: PERIOP | Facility: HOSPITAL | Age: 43
End: 2018-02-08
Payer: COMMERCIAL

## 2018-02-08 ENCOUNTER — TRANSCRIBE ORDERS (OUTPATIENT)
Dept: LAB | Facility: CLINIC | Age: 43
End: 2018-02-08

## 2018-02-08 ENCOUNTER — TELEPHONE (OUTPATIENT)
Dept: SURGERY | Facility: CLINIC | Age: 43
End: 2018-02-08

## 2018-02-08 ENCOUNTER — APPOINTMENT (OUTPATIENT)
Dept: LAB | Facility: CLINIC | Age: 43
End: 2018-02-08
Payer: COMMERCIAL

## 2018-02-08 DIAGNOSIS — K80.10 CHRONIC CALCULOUS CHOLECYSTITIS: ICD-10-CM

## 2018-02-08 LAB
ANION GAP SERPL CALCULATED.3IONS-SCNC: 8 MMOL/L (ref 4–13)
BASOPHILS # BLD AUTO: 0.02 THOUSANDS/ΜL (ref 0–0.1)
BASOPHILS NFR BLD AUTO: 0 % (ref 0–1)
BUN SERPL-MCNC: 9 MG/DL (ref 5–25)
CALCIUM SERPL-MCNC: 8.8 MG/DL (ref 8.3–10.1)
CHLORIDE SERPL-SCNC: 105 MMOL/L (ref 100–108)
CO2 SERPL-SCNC: 27 MMOL/L (ref 21–32)
CREAT SERPL-MCNC: 0.91 MG/DL (ref 0.6–1.3)
EOSINOPHIL # BLD AUTO: 0.14 THOUSAND/ΜL (ref 0–0.61)
EOSINOPHIL NFR BLD AUTO: 2 % (ref 0–6)
ERYTHROCYTE [DISTWIDTH] IN BLOOD BY AUTOMATED COUNT: 13.8 % (ref 11.6–15.1)
GFR SERPL CREATININE-BSD FRML MDRD: 79 ML/MIN/1.73SQ M
GLUCOSE P FAST SERPL-MCNC: 89 MG/DL (ref 65–99)
HCT VFR BLD AUTO: 39.6 % (ref 34.8–46.1)
HGB BLD-MCNC: 12.6 G/DL (ref 11.5–15.4)
LYMPHOCYTES # BLD AUTO: 1.53 THOUSANDS/ΜL (ref 0.6–4.47)
LYMPHOCYTES NFR BLD AUTO: 17 % (ref 14–44)
MCH RBC QN AUTO: 27.2 PG (ref 26.8–34.3)
MCHC RBC AUTO-ENTMCNC: 31.8 G/DL (ref 31.4–37.4)
MCV RBC AUTO: 86 FL (ref 82–98)
MONOCYTES # BLD AUTO: 0.54 THOUSAND/ΜL (ref 0.17–1.22)
MONOCYTES NFR BLD AUTO: 6 % (ref 4–12)
NEUTROPHILS # BLD AUTO: 6.6 THOUSANDS/ΜL (ref 1.85–7.62)
NEUTS SEG NFR BLD AUTO: 75 % (ref 43–75)
PLATELET # BLD AUTO: 375 THOUSANDS/UL (ref 149–390)
PMV BLD AUTO: 9.7 FL (ref 8.9–12.7)
POTASSIUM SERPL-SCNC: 4.2 MMOL/L (ref 3.5–5.3)
RBC # BLD AUTO: 4.63 MILLION/UL (ref 3.81–5.12)
SODIUM SERPL-SCNC: 140 MMOL/L (ref 136–145)
WBC # BLD AUTO: 8.83 THOUSAND/UL (ref 4.31–10.16)

## 2018-02-08 PROCEDURE — 36415 COLL VENOUS BLD VENIPUNCTURE: CPT

## 2018-02-08 PROCEDURE — 80048 BASIC METABOLIC PNL TOTAL CA: CPT

## 2018-02-08 PROCEDURE — 85025 COMPLETE CBC W/AUTO DIFF WBC: CPT

## 2018-02-08 RX ORDER — METOCLOPRAMIDE HYDROCHLORIDE 5 MG/ML
10 INJECTION INTRAMUSCULAR; INTRAVENOUS ONCE AS NEEDED
Status: CANCELLED | OUTPATIENT
Start: 2018-02-08

## 2018-02-08 RX ORDER — PROMETHAZINE HYDROCHLORIDE 25 MG/ML
12.5 INJECTION, SOLUTION INTRAMUSCULAR; INTRAVENOUS ONCE AS NEEDED
Status: CANCELLED | OUTPATIENT
Start: 2018-02-08

## 2018-02-08 RX ORDER — FENTANYL CITRATE/PF 50 MCG/ML
25 SYRINGE (ML) INJECTION AS NEEDED
Status: CANCELLED | OUTPATIENT
Start: 2018-02-08

## 2018-02-08 RX ORDER — ALBUTEROL SULFATE 2.5 MG/3ML
2.5 SOLUTION RESPIRATORY (INHALATION) ONCE AS NEEDED
Status: CANCELLED | OUTPATIENT
Start: 2018-02-08

## 2018-02-08 RX ORDER — ONDANSETRON 2 MG/ML
4 INJECTION INTRAMUSCULAR; INTRAVENOUS ONCE AS NEEDED
Status: CANCELLED | OUTPATIENT
Start: 2018-02-08

## 2018-02-09 ENCOUNTER — APPOINTMENT (OUTPATIENT)
Dept: RADIOLOGY | Facility: HOSPITAL | Age: 43
End: 2018-02-09
Payer: COMMERCIAL

## 2018-02-09 ENCOUNTER — ANESTHESIA (OUTPATIENT)
Dept: PERIOP | Facility: HOSPITAL | Age: 43
End: 2018-02-09
Payer: COMMERCIAL

## 2018-02-09 ENCOUNTER — HOSPITAL ENCOUNTER (OUTPATIENT)
Facility: HOSPITAL | Age: 43
Setting detail: OUTPATIENT SURGERY
Discharge: HOME | End: 2018-02-09
Attending: SURGERY | Admitting: SURGERY
Payer: COMMERCIAL

## 2018-02-09 VITALS
HEART RATE: 70 BPM | RESPIRATION RATE: 20 BRPM | BODY MASS INDEX: 39.39 KG/M2 | OXYGEN SATURATION: 97 % | HEIGHT: 65 IN | DIASTOLIC BLOOD PRESSURE: 92 MMHG | WEIGHT: 236.4 LBS | SYSTOLIC BLOOD PRESSURE: 145 MMHG | TEMPERATURE: 98.1 F

## 2018-02-09 DIAGNOSIS — K80.10 CHRONIC CALCULOUS CHOLECYSTITIS: ICD-10-CM

## 2018-02-09 LAB
EXT PREGNANCY TEST URINE: NEGATIVE
GLUCOSE SERPL-MCNC: 83 MG/DL (ref 65–140)

## 2018-02-09 PROCEDURE — 81025 URINE PREGNANCY TEST: CPT | Performed by: SURGERY

## 2018-02-09 PROCEDURE — 88304 TISSUE EXAM BY PATHOLOGIST: CPT | Performed by: PATHOLOGY

## 2018-02-09 PROCEDURE — 47563 LAPARO CHOLECYSTECTOMY/GRAPH: CPT | Performed by: PHYSICIAN ASSISTANT

## 2018-02-09 PROCEDURE — 82948 REAGENT STRIP/BLOOD GLUCOSE: CPT

## 2018-02-09 PROCEDURE — 88304 TISSUE EXAM BY PATHOLOGIST: CPT | Performed by: SURGERY

## 2018-02-09 PROCEDURE — 47563 LAPARO CHOLECYSTECTOMY/GRAPH: CPT | Performed by: SURGERY

## 2018-02-09 PROCEDURE — 74300 X-RAY BILE DUCTS/PANCREAS: CPT

## 2018-02-09 RX ORDER — METOCLOPRAMIDE HYDROCHLORIDE 5 MG/ML
10 INJECTION INTRAMUSCULAR; INTRAVENOUS ONCE AS NEEDED
Status: DISCONTINUED | OUTPATIENT
Start: 2018-02-09 | End: 2018-02-09 | Stop reason: HOSPADM

## 2018-02-09 RX ORDER — MAGNESIUM HYDROXIDE 1200 MG/15ML
LIQUID ORAL AS NEEDED
Status: DISCONTINUED | OUTPATIENT
Start: 2018-02-09 | End: 2018-02-09 | Stop reason: HOSPADM

## 2018-02-09 RX ORDER — ONDANSETRON 2 MG/ML
INJECTION INTRAMUSCULAR; INTRAVENOUS AS NEEDED
Status: DISCONTINUED | OUTPATIENT
Start: 2018-02-09 | End: 2018-02-09 | Stop reason: SURG

## 2018-02-09 RX ORDER — PANTOPRAZOLE SODIUM 40 MG/1
40 TABLET, DELAYED RELEASE ORAL DAILY
COMMUNITY
End: 2018-05-10 | Stop reason: SDUPTHER

## 2018-02-09 RX ORDER — PROPOFOL 10 MG/ML
INJECTION, EMULSION INTRAVENOUS AS NEEDED
Status: DISCONTINUED | OUTPATIENT
Start: 2018-02-09 | End: 2018-02-09 | Stop reason: SURG

## 2018-02-09 RX ORDER — MIDAZOLAM HYDROCHLORIDE 1 MG/ML
INJECTION INTRAMUSCULAR; INTRAVENOUS AS NEEDED
Status: DISCONTINUED | OUTPATIENT
Start: 2018-02-09 | End: 2018-02-09 | Stop reason: SURG

## 2018-02-09 RX ORDER — SODIUM CHLORIDE, SODIUM LACTATE, POTASSIUM CHLORIDE, CALCIUM CHLORIDE 600; 310; 30; 20 MG/100ML; MG/100ML; MG/100ML; MG/100ML
75 INJECTION, SOLUTION INTRAVENOUS CONTINUOUS
Status: DISCONTINUED | OUTPATIENT
Start: 2018-02-09 | End: 2018-02-09 | Stop reason: HOSPADM

## 2018-02-09 RX ORDER — PROMETHAZINE HYDROCHLORIDE 25 MG/ML
12.5 INJECTION, SOLUTION INTRAMUSCULAR; INTRAVENOUS ONCE AS NEEDED
Status: DISCONTINUED | OUTPATIENT
Start: 2018-02-09 | End: 2018-02-09 | Stop reason: HOSPADM

## 2018-02-09 RX ORDER — KETOROLAC TROMETHAMINE 30 MG/ML
INJECTION, SOLUTION INTRAMUSCULAR; INTRAVENOUS AS NEEDED
Status: DISCONTINUED | OUTPATIENT
Start: 2018-02-09 | End: 2018-02-09 | Stop reason: SURG

## 2018-02-09 RX ORDER — DULOXETIN HYDROCHLORIDE 30 MG/1
20 CAPSULE, DELAYED RELEASE ORAL DAILY
COMMUNITY
End: 2018-03-29 | Stop reason: SDUPTHER

## 2018-02-09 RX ORDER — FENTANYL CITRATE/PF 50 MCG/ML
25 SYRINGE (ML) INJECTION
Status: DISCONTINUED | OUTPATIENT
Start: 2018-02-09 | End: 2018-02-09 | Stop reason: HOSPADM

## 2018-02-09 RX ORDER — OXYCODONE HYDROCHLORIDE AND ACETAMINOPHEN 5; 325 MG/1; MG/1
1 TABLET ORAL EVERY 4 HOURS PRN
Status: DISCONTINUED | OUTPATIENT
Start: 2018-02-09 | End: 2018-02-09 | Stop reason: HOSPADM

## 2018-02-09 RX ORDER — ROCURONIUM BROMIDE 10 MG/ML
INJECTION, SOLUTION INTRAVENOUS AS NEEDED
Status: DISCONTINUED | OUTPATIENT
Start: 2018-02-09 | End: 2018-02-09 | Stop reason: SURG

## 2018-02-09 RX ORDER — ALBUTEROL SULFATE 90 UG/1
AEROSOL, METERED RESPIRATORY (INHALATION) AS NEEDED
Status: DISCONTINUED | OUTPATIENT
Start: 2018-02-09 | End: 2018-02-09 | Stop reason: SURG

## 2018-02-09 RX ORDER — ONDANSETRON 2 MG/ML
4 INJECTION INTRAMUSCULAR; INTRAVENOUS EVERY 6 HOURS PRN
Status: DISCONTINUED | OUTPATIENT
Start: 2018-02-09 | End: 2018-02-09 | Stop reason: HOSPADM

## 2018-02-09 RX ORDER — BUPIVACAINE HYDROCHLORIDE 2.5 MG/ML
INJECTION, SOLUTION EPIDURAL; INFILTRATION; INTRACAUDAL AS NEEDED
Status: DISCONTINUED | OUTPATIENT
Start: 2018-02-09 | End: 2018-02-09 | Stop reason: HOSPADM

## 2018-02-09 RX ORDER — FENTANYL CITRATE 50 UG/ML
INJECTION, SOLUTION INTRAMUSCULAR; INTRAVENOUS AS NEEDED
Status: DISCONTINUED | OUTPATIENT
Start: 2018-02-09 | End: 2018-02-09 | Stop reason: SURG

## 2018-02-09 RX ORDER — LIDOCAINE HYDROCHLORIDE 10 MG/ML
INJECTION, SOLUTION INFILTRATION; PERINEURAL AS NEEDED
Status: DISCONTINUED | OUTPATIENT
Start: 2018-02-09 | End: 2018-02-09 | Stop reason: SURG

## 2018-02-09 RX ORDER — SODIUM CHLORIDE, SODIUM LACTATE, POTASSIUM CHLORIDE, CALCIUM CHLORIDE 600; 310; 30; 20 MG/100ML; MG/100ML; MG/100ML; MG/100ML
50 INJECTION, SOLUTION INTRAVENOUS CONTINUOUS
Status: DISCONTINUED | OUTPATIENT
Start: 2018-02-09 | End: 2018-02-09 | Stop reason: HOSPADM

## 2018-02-09 RX ORDER — MORPHINE SULFATE 2 MG/ML
2 INJECTION, SOLUTION INTRAMUSCULAR; INTRAVENOUS EVERY 2 HOUR PRN
Status: DISCONTINUED | OUTPATIENT
Start: 2018-02-09 | End: 2018-02-09 | Stop reason: HOSPADM

## 2018-02-09 RX ORDER — ONDANSETRON 2 MG/ML
4 INJECTION INTRAMUSCULAR; INTRAVENOUS ONCE AS NEEDED
Status: DISCONTINUED | OUTPATIENT
Start: 2018-02-09 | End: 2018-02-09 | Stop reason: HOSPADM

## 2018-02-09 RX ORDER — ACETAMINOPHEN AND CODEINE PHOSPHATE 300; 30 MG/1; MG/1
1 TABLET ORAL EVERY 6 HOURS PRN
Qty: 30 TABLET | Refills: 0 | Status: SHIPPED | OUTPATIENT
Start: 2018-02-09 | End: 2018-02-19

## 2018-02-09 RX ADMIN — FENTANYL CITRATE 25 MCG: 50 INJECTION INTRAMUSCULAR; INTRAVENOUS at 10:59

## 2018-02-09 RX ADMIN — ALBUTEROL SULFATE 8 PUFF: 90 AEROSOL, METERED RESPIRATORY (INHALATION) at 10:17

## 2018-02-09 RX ADMIN — SUGAMMADEX 400 MG: 100 INJECTION, SOLUTION INTRAVENOUS at 10:04

## 2018-02-09 RX ADMIN — ROCURONIUM BROMIDE 40 MG: 10 INJECTION INTRAVENOUS at 09:26

## 2018-02-09 RX ADMIN — FENTANYL CITRATE 25 MCG: 50 INJECTION INTRAMUSCULAR; INTRAVENOUS at 10:51

## 2018-02-09 RX ADMIN — ENOXAPARIN SODIUM 40 MG: 40 INJECTION SUBCUTANEOUS at 09:09

## 2018-02-09 RX ADMIN — LIDOCAINE HYDROCHLORIDE 50 MG: 10 INJECTION, SOLUTION INFILTRATION; PERINEURAL at 09:26

## 2018-02-09 RX ADMIN — FENTANYL CITRATE 50 MCG: 50 INJECTION, SOLUTION INTRAMUSCULAR; INTRAVENOUS at 09:43

## 2018-02-09 RX ADMIN — FENTANYL CITRATE 100 MCG: 50 INJECTION, SOLUTION INTRAMUSCULAR; INTRAVENOUS at 09:26

## 2018-02-09 RX ADMIN — CEFAZOLIN SODIUM 2000 MG: 2 SOLUTION INTRAVENOUS at 09:31

## 2018-02-09 RX ADMIN — SODIUM CHLORIDE, SODIUM LACTATE, POTASSIUM CHLORIDE, AND CALCIUM CHLORIDE: .6; .31; .03; .02 INJECTION, SOLUTION INTRAVENOUS at 08:34

## 2018-02-09 RX ADMIN — FENTANYL CITRATE 50 MCG: 50 INJECTION, SOLUTION INTRAMUSCULAR; INTRAVENOUS at 09:39

## 2018-02-09 RX ADMIN — DEXAMETHASONE SODIUM PHOSPHATE 4 MG: 10 INJECTION INTRAMUSCULAR; INTRAVENOUS at 09:34

## 2018-02-09 RX ADMIN — PROPOFOL 200 MG: 10 INJECTION, EMULSION INTRAVENOUS at 09:26

## 2018-02-09 RX ADMIN — MIDAZOLAM 2 MG: 1 INJECTION INTRAMUSCULAR; INTRAVENOUS at 09:24

## 2018-02-09 RX ADMIN — ONDANSETRON 4 MG: 2 INJECTION INTRAMUSCULAR; INTRAVENOUS at 10:08

## 2018-02-09 RX ADMIN — KETOROLAC TROMETHAMINE 30 MG: 30 INJECTION, SOLUTION INTRAMUSCULAR at 10:07

## 2018-02-09 NOTE — ANESTHESIA POSTPROCEDURE EVALUATION
Post-Op Assessment Note      CV Status:  Stable    Mental Status:  Alert and awake    Hydration Status:  Stable    PONV Controlled:  None    Airway Patency:  Patent and adequate    Post Op Vitals Reviewed: Yes          Staff: AnesthesiologistGRACE           BP (P) 159/78 (02/09/18 1028)    Temp (!) (P) 97 3 °F (36 3 °C) (02/09/18 1028)    Pulse (P) 72 (02/09/18 1028)   Resp (P) 18 (02/09/18 1028)    SpO2   100%

## 2018-02-09 NOTE — ANESTHESIA PREPROCEDURE EVALUATION
Review of Systems/Medical History  Patient summary reviewed        Cardiovascular  Negative cardio ROS    Pulmonary  Negative pulmonary ROS        GI/Hepatic     Hiatal hernia,        Negative  ROS        Endo/Other    Obesity  morbid obesity   GYN  Negative gynecology ROS          Hematology  Negative hematology ROS      Musculoskeletal    Arthritis     Neurology    Neuromuscular disease ,    Psychology   Negative psychology ROS              Physical Exam    Airway    Mallampati score: II  TM Distance: >3 FB  Neck ROM: full     Dental       Cardiovascular  Comment: Negative ROS, Cardiovascular exam normal    Pulmonary  Pulmonary exam normal     Other Findings        Anesthesia Plan  ASA Score- 3     Anesthesia Type- general with ASA Monitors  Additional Monitors:   Airway Plan: ETT  Plan Factors-    Induction- intravenous  Postoperative Plan-     Informed Consent- Anesthetic plan and risks discussed with patient  I personally reviewed this patient with the CRNA  Discussed and agreed on the Anesthesia Plan with the CRNA  Dara Soulier

## 2018-02-09 NOTE — H&P
I review and agree with the history and physical from Jan 18, 2018 from my office without any changes

## 2018-02-09 NOTE — DISCHARGE INSTRUCTIONS
No diet restriction for this surgery  May shower every day  Remove dressings in 3 days  Remove strips in 7 days  Call office to make an appointment in 2 weeks  Call office with any issues regarding the surgery  No driving, heavy lifting or strenuous exercise for one week  Resume home medications  Apply ice to the incisions  May take Tylenol 3, regular Tylenol or ibuprofen for pain    Laparoscopic Cholecystectomy   WHAT YOU NEED TO KNOW:   Laparoscopic cholecystectomy is surgery to remove your gallbladder  DISCHARGE INSTRUCTIONS:   Medicines: You may need any of the following:  · Prescription pain medicine  helps decrease pain  Do not wait until the pain is severe before you take this medicine  · NSAIDs  decrease swelling and pain  This medicine can be bought with or without a doctor's order  This medicine can cause stomach bleeding or kidney problems in certain people  If you take blood thinner medicine, always ask your healthcare provider if NSAIDs are safe for you  Read the medicine label and follow the directions on it before using this medicine  · Take your medicine as directed  Contact your healthcare provider if you think your medicine is not helping or if you have side effects  Tell him or her if you are allergic to any medicine  Keep a list of the medicines, vitamins, and herbs you take  Include the amounts, and when and why you take them  Bring the list or the pill bottles to follow-up visits  Carry your medicine list with you in case of an emergency  Follow up with your healthcare provider 2 weeks after surgery, or as directed:  Write down your questions so you remember to ask them during your visits  Wound care:  Care for your surgical wounds as directed  Keep the wounds clean and dry  You may take a shower the day after your surgery  What to eat after surgery:  Eat low-fat foods for 4 to 6 weeks while your body learns to digest fat without a gallbladder   Slowly increase the amount of fat that you eat  Drink plenty of liquids  Ask how much liquid to drink and which liquids are best for you  When to return to work and other activities: You may return to work or other activities as soon as your pain is controlled and you feel comfortable  For many people, this is 5 to 7 days after surgery  Contact your healthcare provider if:   · You have a fever over 101°F (38°C) or chills  · You have pain or nausea that is not relieved by medicine  · You have redness and swelling around your incisions, or blood or pus is leaking from your incisions  · You are constipated or have diarrhea  · Your skin or eyes are yellow, or your bowel movements are pale  · You have questions or concerns about your surgery, condition, or care  Seek care immediately or call 911 if:   · You cannot stop vomiting  · Your bowel movements are black or bloody  · You have pain in your abdomen and it is swollen or hard  · Your arm or leg feels warm, tender, and painful  It may look swollen and red  · You feel lightheaded, short of breath, and have chest pain  · You cough up blood  © 2017 [facility] Information is for End User's use only and may not be sold, redistributed or otherwise used for commercial purposes  All illustrations and images included in CareNotes® are the copyrighted property of [employer]  or [address]  The above information is an  only  It is not intended as medical advice for individual conditions or treatments  Talk to your doctor, nurse or pharmacist before following any medical regimen to see if it is safe and effective for you

## 2018-02-09 NOTE — OP NOTE
OPERATIVE REPORT  PATIENT NAME: Darvin Baumgarten    :  1975  MRN: [id number]  Pt Location: [facility] OR ROOM 03    SURGERY DATE: 2018    Surgeon(s) and Role:     * Smith Shetty MD - Primary     * [place] PA-C - Assisting    Preop Diagnosis:  Chronic calculous cholecystitis [K80 10]    Post-Op Diagnosis Codes:     * Chronic calculous cholecystitis [K80 10]    Procedure(s) (LRB):  LAPAROSCOPIC CHOLECYSTECTOMY WITH IOC (N/A)    Specimen(s):  ID Type Source Tests Collected by Time Destination   1 : galbladder and contents Tissue Gallbladder TISSUE EXAM Smith Shetty MD 2018 [id number]        Estimated Blood Loss:   Minimal    Drains:       Anesthesia Type:   General    Operative Indications:  Chronic calculous cholecystitis [K80 10]  40 year female dealing with episodes of epigastric and lower chest pain and sometimes radiated to the right upper quadrant with constant nausea without vomiting  Ultrasound the gallbladder from December showed multiple gallstones  In light of the above the patient was advised to undergo laparoscopic cholecystectomy  Operative Findings: The gallbladder was markedly distended with edema of the wall  There were no surrounded adhesions  The liver surface appeared normal   Intraoperative cholangiograms with the help of the C-arm failed to reveal any proximal distal filling defects and the contrast went through into the small bowel  Complications:   None    Procedure and Technique:  The patient was identified and she was placed in the operating table in a supine position  After adequate anesthesia induction and satisfactory endotracheal intubation the abdomen was prepped and draped under sterile usual fashion with ChloraPrep  The abdominal wall was elevated with towel clips, an incision was made through the umbilicus and 5 mm trocar was introduced  After verifying the position and the abdomen was insufflated with CO2   After obtaining adequate pneumoperitoneum the scope was advanced and exploration was performed with above findings  11 mm trocar was placed in the epigastric area and 5 mm trocar in the midclavicular and anterior axillary line under direct vision  The fundus of the gallbladder was grasped and pulled towards the right shoulder  The infundibulum of the gallbladder was grasped and pulled towards the right side  The cystic duct was identified, dissected and  stapled distally  An incision was made over the cystic duct with the scissors  Cholangiogram catheter was inserted through a separate stab wound and inserted into the cystic duct and secured with the Endo Clip  Intraoperative cholangiogram was performed with the help of the C-arm with the above findings  The cholangiogram catheter was removed and the cystic duct was stapled proximally ×2 and then divided with scissors  The cystic artery was also identified, dissected, stapled proximally and distally and then divided with scissors  The gallbladder was removed from the gallbladder fossa using electrocautery and the hook  The gallbladder was retrieved through the epigastric port site with the help of the Endo Catch  The abdominal cavity was copiously irrigated with saline solution  The gallbladder fossa was dry  The cystic duct and cystic artery were inspected with no evidence of bile leak or bleeding respectively  The ports were removed under direct vision without evidence of bleeding from the abdominal wall  The epigastric port site fascia was closed with 0 Vicryl in an interrupted figure-of-eight fashion  The subcutaneous tissue was infiltrated with 0 25% Marcaine and the skin was closed with a 4-0 Vicryl in an interrupted significant fashion  Sterile dressings were applied  At the end of the case instrument, needles, sponges counts were correct  The patient tolerated the procedure well and then he was transferred to recovery room in a stable conditions       I was present for the entire procedure, A qualified resident physician was not available and A physician assistant was required during the procedure for retraction tissue handling,dissection and suturing    Patient Disposition:  PACU     SIGNATURE: Nini Gao MD  DATE: February 9, 2018  TIME: 10:07 AM

## 2018-02-22 ENCOUNTER — OFFICE VISIT (OUTPATIENT)
Dept: SURGERY | Facility: CLINIC | Age: 43
End: 2018-02-22

## 2018-02-22 ENCOUNTER — OFFICE VISIT (OUTPATIENT)
Dept: INTERNAL MEDICINE CLINIC | Facility: CLINIC | Age: 43
End: 2018-02-22
Payer: COMMERCIAL

## 2018-02-22 VITALS
BODY MASS INDEX: 38.65 KG/M2 | SYSTOLIC BLOOD PRESSURE: 120 MMHG | DIASTOLIC BLOOD PRESSURE: 80 MMHG | OXYGEN SATURATION: 98 % | HEART RATE: 82 BPM | WEIGHT: 232 LBS | HEIGHT: 65 IN

## 2018-02-22 VITALS
TEMPERATURE: 98.6 F | WEIGHT: 232.25 LBS | HEIGHT: 65 IN | SYSTOLIC BLOOD PRESSURE: 120 MMHG | BODY MASS INDEX: 38.7 KG/M2 | DIASTOLIC BLOOD PRESSURE: 80 MMHG | RESPIRATION RATE: 16 BRPM

## 2018-02-22 DIAGNOSIS — M79.7 FIBROMYALGIA: ICD-10-CM

## 2018-02-22 DIAGNOSIS — H10.32 ACUTE CONJUNCTIVITIS OF LEFT EYE, UNSPECIFIED ACUTE CONJUNCTIVITIS TYPE: Primary | ICD-10-CM

## 2018-02-22 DIAGNOSIS — K80.10 CHRONIC CALCULOUS CHOLECYSTITIS: ICD-10-CM

## 2018-02-22 DIAGNOSIS — K58.2 IRRITABLE BOWEL SYNDROME WITH BOTH CONSTIPATION AND DIARRHEA: ICD-10-CM

## 2018-02-22 DIAGNOSIS — Z48.89 POSTOPERATIVE VISIT: Primary | ICD-10-CM

## 2018-02-22 DIAGNOSIS — F32.9 REACTIVE DEPRESSION: ICD-10-CM

## 2018-02-22 PROBLEM — K58.9 IRRITABLE BOWEL SYNDROME: Status: ACTIVE | Noted: 2017-11-16

## 2018-02-22 PROBLEM — F32.A DEPRESSION: Status: ACTIVE | Noted: 2018-01-18

## 2018-02-22 PROBLEM — K80.20 GALLSTONES: Status: ACTIVE | Noted: 2017-12-28

## 2018-02-22 PROCEDURE — 99214 OFFICE O/P EST MOD 30 MIN: CPT | Performed by: INTERNAL MEDICINE

## 2018-02-22 PROCEDURE — 99024 POSTOP FOLLOW-UP VISIT: CPT | Performed by: SURGERY

## 2018-02-22 RX ORDER — OLOPATADINE HYDROCHLORIDE 2 MG/ML
0.2 SOLUTION/ DROPS OPHTHALMIC AS NEEDED
COMMUNITY
Start: 2015-02-06

## 2018-02-22 RX ORDER — CIPROFLOXACIN HYDROCHLORIDE 3.5 MG/ML
1 SOLUTION/ DROPS TOPICAL
Qty: 5 ML | Refills: 0 | Status: SHIPPED | OUTPATIENT
Start: 2018-02-22 | End: 2018-03-29 | Stop reason: ALTCHOICE

## 2018-02-22 RX ORDER — DICYCLOMINE HYDROCHLORIDE 10 MG/1
1 CAPSULE ORAL 3 TIMES DAILY
COMMUNITY
Start: 2017-11-16 | End: 2018-03-29 | Stop reason: ALTCHOICE

## 2018-02-22 NOTE — PROGRESS NOTES
Assessment/Plan:  Regarding the cholecystectomy she is recovering well  Regarding the depression she is much improved on Cymbalta  Regarding the fibromyalgia she is improved on Cymbalta therapy  She has no side effects from the medication  She is interested in pursuing a weight loss program which we will discuss in for 5 weeks  In the meantime she will continue dietary measures  Will see her back in for 5 weeks  She will continue Cymbalta  No problem-specific Assessment & Plan notes found for this encounter  Diagnoses and all orders for this visit:    Acute conjunctivitis of left eye, unspecified acute conjunctivitis type  -     ciprofloxacin (CILOXAN) 0 3 % ophthalmic solution; Administer 1 drop into the left eye every 2 (two) hours    Chronic calculous cholecystitis    Irritable bowel syndrome with both constipation and diarrhea    Reactive depression    Fibromyalgia    Other orders  -     dicyclomine (BENTYL) 10 mg capsule; Take 1 capsule by mouth 3 (three) times a day  -     Cetirizine HCl (ZYRTEC ALLERGY) 10 MG CAPS; Take 1 tablet by mouth  -     olopatadine HCl (PATADAY) 0 2 % opth drops; Apply 0 2 % to eye  -     DOCOSAHEXAENOIC ACID PO; Take 1 tablet by mouth          Subjective:  Problems are 1  Gallstones 2  Depression 3  Fibromyalgia  Patient ID: Ruba Brewer is a 40 y o  female  HPI she had a cholecystectomy  She did very well  She is almost free of any discomfort  Her incisions are healing well  I placed her on Cymbalta 30 mg per day  She feels much better  Her fibromyalgia is better  Her spirits are better      The following portions of the patient's history were reviewed and updated as appropriate: allergies, current medications, past family history, past medical history, past social history, past surgical history and problem list     Review of Systems   Constitutional: Negative for activity change, appetite change, chills, diaphoresis, fatigue, fever and unexpected weight change  HENT: Negative for congestion, ear pain, hearing loss, mouth sores, nosebleeds, postnasal drip, sinus pain, sinus pressure, sore throat and trouble swallowing  Eyes: Negative for pain, discharge and visual disturbance  Respiratory: Negative for apnea, cough, chest tightness, shortness of breath and wheezing  Cardiovascular: Negative for chest pain, palpitations and leg swelling  Gastrointestinal: Negative for abdominal pain, anal bleeding, blood in stool, constipation, diarrhea, nausea and vomiting  She has almost no incisional pain  She is overweight  She still has irritable at bowel syndrome   Endocrine: Negative for polydipsia and polyphagia  Genitourinary: Negative for decreased urine volume, dysuria, flank pain, frequency, hematuria and urgency  Musculoskeletal: Negative for arthralgias, back pain, gait problem, joint swelling and myalgias  Her fibromyalgia is improved on Cymbalta   Skin: Negative for rash and wound  Allergic/Immunologic: Negative for environmental allergies and food allergies  Neurological: Negative for dizziness, tremors, seizures, syncope, speech difficulty, light-headedness, numbness and headaches  Hematological: Negative for adenopathy  Does not bruise/bleed easily  Psychiatric/Behavioral: Negative for agitation, confusion, hallucinations, sleep disturbance and suicidal ideas  The patient is not nervous/anxious  Her depression has improved on Cymbalta         Objective:     Physical Exam   Constitutional: She appears well-developed and well-nourished  No distress  She is markedly overweight   HENT:   Head: Normocephalic  Right Ear: External ear normal    Left Ear: External ear normal    Nose: Nose normal    Mouth/Throat: Oropharynx is clear and moist  No oropharyngeal exudate  Eyes: Conjunctivae and EOM are normal  Pupils are equal, round, and reactive to light   Right eye exhibits no discharge  Left eye exhibits no discharge  Neck: Normal range of motion  Neck supple  No thyromegaly present  Cardiovascular: Normal rate, regular rhythm, normal heart sounds and intact distal pulses  Exam reveals no gallop and no friction rub  No murmur heard  Pulmonary/Chest: Effort normal and breath sounds normal  No respiratory distress  She has no wheezes  She has no rales  Abdominal: Soft  Bowel sounds are normal  She exhibits no distension and no mass  There is no tenderness  There is no rebound and no guarding  Abdomen is overweight  Incisions are healing quite well   Musculoskeletal: Normal range of motion  She exhibits no edema, tenderness or deformity  Lymphadenopathy:     She has no cervical adenopathy  Neurological: She is alert  She has normal reflexes  No cranial nerve deficit  Coordination normal    Skin: Skin is warm and dry  No rash noted  No erythema  Psychiatric: She has a normal mood and affect  Her behavior is normal  Judgment and thought content normal    Nursing note and vitals reviewed

## 2018-02-22 NOTE — PROGRESS NOTES
Assessment:  Status post laparoscopic cholecystectomy for symptomatic gallstones improved  The patient offers no complaints  Plan:  The patient is discharged from my care      HPI:  First postop follow-up after laparoscopic cholecystectomy for symptomatic gallstones  I reviewed the pathology report with the patient  She offers no complaints at this time  Physical exam:  The abdomen is soft, nondistended and nontender  Incisions are healing well without evidence of infection or incisional hernia

## 2018-03-29 ENCOUNTER — OFFICE VISIT (OUTPATIENT)
Dept: INTERNAL MEDICINE CLINIC | Facility: CLINIC | Age: 43
End: 2018-03-29
Payer: COMMERCIAL

## 2018-03-29 VITALS
HEART RATE: 84 BPM | WEIGHT: 237 LBS | SYSTOLIC BLOOD PRESSURE: 110 MMHG | BODY MASS INDEX: 39.44 KG/M2 | DIASTOLIC BLOOD PRESSURE: 74 MMHG

## 2018-03-29 DIAGNOSIS — M79.7 FIBROMYALGIA: ICD-10-CM

## 2018-03-29 DIAGNOSIS — F32.9 REACTIVE DEPRESSION: ICD-10-CM

## 2018-03-29 DIAGNOSIS — R53.82 CHRONIC FATIGUE: ICD-10-CM

## 2018-03-29 DIAGNOSIS — L98.9 SKIN LESION: ICD-10-CM

## 2018-03-29 DIAGNOSIS — K58.2 IRRITABLE BOWEL SYNDROME WITH BOTH CONSTIPATION AND DIARRHEA: Primary | ICD-10-CM

## 2018-03-29 DIAGNOSIS — E66.3 OVERWEIGHT: ICD-10-CM

## 2018-03-29 PROCEDURE — 99214 OFFICE O/P EST MOD 30 MIN: CPT | Performed by: INTERNAL MEDICINE

## 2018-03-29 RX ORDER — DULOXETIN HYDROCHLORIDE 30 MG/1
60 CAPSULE, DELAYED RELEASE ORAL DAILY
Qty: 30 CAPSULE | Refills: 5 | Status: SHIPPED | OUTPATIENT
Start: 2018-03-29 | End: 2018-04-10 | Stop reason: SDUPTHER

## 2018-03-29 NOTE — PROGRESS NOTES
Assessment/Plan:  Regarding her spirits she is actually doing fairly well  Regarding her fibromyalgia she is not doing well at all  I think she does have fibromyalgia  She is on Cymbalta 30 mg per day  Will increase that to 60 mg per day  If she feels worse she will let me know  I will also refer her to Rheumatology for evaluation and repeat her sed rate and CPK  I am also referring her to weight management and will check thyroid test on her  Will see her back here in 1 month  No problem-specific Assessment & Plan notes found for this encounter  Diagnoses and all orders for this visit:    Irritable bowel syndrome with both constipation and diarrhea    Reactive depression    Overweight  -     T4, free; Future  -     TSH, 3rd generation; Future  -     Ambulatory referral to Weight Management; Future    Fibromyalgia  -     Ambulatory referral to Rheumatology; Future  -     CK (with reflex to MB); Future  -     Sedimentation rate, automated; Future  -     DULoxetine (CYMBALTA) 30 mg delayed release capsule; Take 2 capsules (60 mg total) by mouth daily    Chronic fatigue    Skin lesion  -     Ambulatory referral to Dermatology; Future          Subjective:  Problems are 1  Fibromyalgia 2  Depression with   Patient ID: Rosina Baez is a 40 y o  female  Anxiety 3  4  Skin lesions, overweight  HPI she comes in for follow-up  She is actually having a flare-up of her fibromyalgia now  She has joint pain but also has muscular all muscular pain  Workup last fall was negative  Sed rate was normal   In when I 1st put her on Cymbalta 30 mg a day she felt much better within a couple of weeks  Now she feels that is back  She recovered well from her cholecystectomy  Denies any chest pain or shortness of breath  She does have a great deal fatigue    The following portions of the patient's history were reviewed and updated as appropriate: allergies, current medications, past family history, past medical history, past social history, past surgical history and problem list     Review of Systems   Constitutional: Negative for activity change, appetite change, chills, diaphoresis, fatigue, fever and unexpected weight change  HENT: Negative for congestion, ear pain, hearing loss, mouth sores, nosebleeds, postnasal drip, sinus pain, sinus pressure, sore throat and trouble swallowing  Eyes: Negative for pain, discharge and visual disturbance  Respiratory: Negative for apnea, cough, chest tightness, shortness of breath and wheezing  Cardiovascular: Negative for chest pain, palpitations and leg swelling  Gastrointestinal: Negative for abdominal pain, anal bleeding, blood in stool, constipation, diarrhea, nausea and vomiting  Endocrine: Negative for polydipsia and polyphagia  Genitourinary: Negative for decreased urine volume, dysuria, flank pain, frequency, hematuria and urgency  Musculoskeletal: Positive for arthralgias, back pain and myalgias  Negative for gait problem and joint swelling  Skin: Negative for rash and wound  Allergic/Immunologic: Negative for environmental allergies and food allergies  Neurological: Negative for dizziness, tremors, seizures, syncope, speech difficulty, light-headedness, numbness and headaches  Hematological: Negative for adenopathy  Does not bruise/bleed easily  Psychiatric/Behavioral: Negative for agitation, confusion, hallucinations, sleep disturbance and suicidal ideas  The patient is not nervous/anxious  Objective:     Physical Exam   Constitutional: She appears well-developed and well-nourished  No distress  She is markedly overweight   HENT:   Head: Normocephalic  Right Ear: External ear normal    Left Ear: External ear normal    Nose: Nose normal    Mouth/Throat: Oropharynx is clear and moist  No oropharyngeal exudate  Eyes: Conjunctivae and EOM are normal  Pupils are equal, round, and reactive to light   Right eye exhibits no discharge  Left eye exhibits no discharge  Neck: Normal range of motion  Neck supple  No thyromegaly present  Cardiovascular: Normal rate, regular rhythm, normal heart sounds and intact distal pulses  Exam reveals no gallop and no friction rub  No murmur heard  Pulmonary/Chest: Effort normal and breath sounds normal  No respiratory distress  She has no wheezes  She has no rales  Abdominal: Soft  Bowel sounds are normal  She exhibits no distension and no mass  There is no tenderness  There is no rebound and no guarding  Musculoskeletal: Normal range of motion  She exhibits no edema, tenderness or deformity  She has trigger points scattered throughout her body  She also has joint tenderness but no real synovitis present  Lymphadenopathy:     She has no cervical adenopathy  Neurological: She is alert  She has normal reflexes  No cranial nerve deficit  Coordination normal    Skin: Skin is warm and dry  No rash noted  No erythema  Psychiatric: She has a normal mood and affect  Her behavior is normal  Judgment and thought content normal    Nursing note and vitals reviewed

## 2018-04-03 ENCOUNTER — APPOINTMENT (OUTPATIENT)
Dept: LAB | Facility: CLINIC | Age: 43
End: 2018-04-03
Payer: COMMERCIAL

## 2018-04-03 DIAGNOSIS — E66.3 OVERWEIGHT: ICD-10-CM

## 2018-04-03 DIAGNOSIS — M79.7 FIBROMYALGIA: ICD-10-CM

## 2018-04-03 LAB
CK SERPL-CCNC: 96 U/L (ref 26–192)
T4 FREE SERPL-MCNC: 0.86 NG/DL (ref 0.76–1.46)
TSH SERPL DL<=0.05 MIU/L-ACNC: 2.01 UIU/ML (ref 0.36–3.74)

## 2018-04-03 PROCEDURE — 36415 COLL VENOUS BLD VENIPUNCTURE: CPT

## 2018-04-03 PROCEDURE — 84439 ASSAY OF FREE THYROXINE: CPT

## 2018-04-03 PROCEDURE — 85652 RBC SED RATE AUTOMATED: CPT

## 2018-04-03 PROCEDURE — 84443 ASSAY THYROID STIM HORMONE: CPT

## 2018-04-03 PROCEDURE — 82550 ASSAY OF CK (CPK): CPT

## 2018-04-04 LAB — ERYTHROCYTE [SEDIMENTATION RATE] IN BLOOD: 28 MM/HOUR (ref 0–20)

## 2018-04-10 DIAGNOSIS — M79.7 FIBROMYALGIA: ICD-10-CM

## 2018-04-10 RX ORDER — DULOXETIN HYDROCHLORIDE 60 MG/1
60 CAPSULE, DELAYED RELEASE ORAL DAILY
Qty: 30 CAPSULE | Refills: 5 | Status: SHIPPED | OUTPATIENT
Start: 2018-04-10 | End: 2018-10-20 | Stop reason: SDUPTHER

## 2018-04-10 NOTE — TELEPHONE ENCOUNTER
[facility] CALLED AND SAID THE INSURANCE WILL NOT COVER THE DULOXETINE 30MG BID   THEY WILL COVER THE DULOXETINE 60MG QD  PLEASE SEND NEW SCRIPT FOR THAT  AARON  [phone]

## 2018-05-10 DIAGNOSIS — K29.60 EROSIVE GASTRITIS: Primary | ICD-10-CM

## 2018-05-10 RX ORDER — PANTOPRAZOLE SODIUM 40 MG/1
TABLET, DELAYED RELEASE ORAL
Qty: 30 TABLET | Refills: 4 | Status: SHIPPED | OUTPATIENT
Start: 2018-05-10 | End: 2018-10-07 | Stop reason: SDUPTHER

## 2018-06-20 ENCOUNTER — ANNUAL EXAM (OUTPATIENT)
Dept: OBGYN CLINIC | Facility: CLINIC | Age: 43
End: 2018-06-20
Payer: COMMERCIAL

## 2018-06-20 VITALS
DIASTOLIC BLOOD PRESSURE: 74 MMHG | WEIGHT: 241 LBS | BODY MASS INDEX: 40.15 KG/M2 | SYSTOLIC BLOOD PRESSURE: 112 MMHG | HEIGHT: 65 IN

## 2018-06-20 DIAGNOSIS — N92.0 MENORRHAGIA WITH REGULAR CYCLE: ICD-10-CM

## 2018-06-20 DIAGNOSIS — Z01.419 WOMEN'S ANNUAL ROUTINE GYNECOLOGICAL EXAMINATION: Primary | ICD-10-CM

## 2018-06-20 DIAGNOSIS — Z12.39 BREAST CANCER SCREENING: ICD-10-CM

## 2018-06-20 PROCEDURE — G0124 SCREEN C/V THIN LAYER BY MD: HCPCS | Performed by: PATHOLOGY

## 2018-06-20 PROCEDURE — S0612 ANNUAL GYNECOLOGICAL EXAMINA: HCPCS | Performed by: OBSTETRICS & GYNECOLOGY

## 2018-06-20 PROCEDURE — 87624 HPV HI-RISK TYP POOLED RSLT: CPT | Performed by: OBSTETRICS & GYNECOLOGY

## 2018-06-20 PROCEDURE — G0145 SCR C/V CYTO,THINLAYER,RESCR: HCPCS | Performed by: PATHOLOGY

## 2018-06-20 RX ORDER — TRANEXAMIC ACID 650 1/1
TABLET ORAL
Qty: 30 TABLET | Refills: 3 | Status: SHIPPED | OUTPATIENT
Start: 2018-06-20 | End: 2019-06-17 | Stop reason: ALTCHOICE

## 2018-06-20 NOTE — PATIENT INSTRUCTIONS
The patient was informed of a stable gyn examination  A Pap smear was performed  Because of her history of heavy menstrual cycles which are regular predictable  Decided to try a course of Lysteda  She will keep me informed of her progress  She should return my office in 1 year if no new problems

## 2018-06-20 NOTE — PROGRESS NOTES
This is a 78-year-old white female, she is a  3 para 3 with 3 prior  sections  Her last  section was also accompanied by tubal ligation  She is in the process of getting a divorce  She is now dating  She was last seen in this office 2 years ago  Her menstrual cycles are regular but heavy and interfering with her lifestyle  She is in a stable relationship for the last year and a half  There is no problem with intimacy  She denies any other family illnesses at this time  She is concerned about her weight she will be joining a medical weight loss program soon  She has a dentist on a regular basis  Her current medical problems she has been treated for anxiety, and asthma  Review of systems is negative medical problems include anxiety and I asthma        Surgical history significant for  sections x 3 with a tubal ligation  Recent history of laparoscopic cholecystectomy        Family history is positive for pancreatic cancer      Social history negative for tobacco positive for alcohol      Physical exam      This is a well-developed well-nourished white female with increased BMI  Her HEENT is was within normal limits  Cardiac exam shows a regular rhythm and rate  Normal S1-S2  Lungs are clear to A&P  Abdomen soft nontender no masses positive bowel sound  There is evidence of recent laparoscopic cholecystectomy  There is evidence of  section x3 but  Both scars are healing well  Pelvic examination the external genitalia normal limits the vagina is clean the uterus is retroverted normal size  There is no cervical motion tenderness  There is no palpable fibroids  The adnexa clear bilaterally  Impression stable gyn examination  A Pap smear was performed  Because of her history of menorrhagia we will now try Lysteda  This explained to the patient how to use  She will come back in 3 months with a progress report    She will make arrangements for a mammogram   She should return my office in 1 year

## 2018-06-22 LAB — HPV RRNA GENITAL QL NAA+PROBE: ABNORMAL

## 2018-06-27 LAB
LAB AP GYN PRIMARY INTERPRETATION: NORMAL
LAB AP LMP: NORMAL
Lab: NORMAL
PATH INTERP SPEC-IMP: NORMAL

## 2018-06-28 DIAGNOSIS — F32.A DEPRESSION, UNSPECIFIED DEPRESSION TYPE: Primary | ICD-10-CM

## 2018-06-28 RX ORDER — DULOXETIN HYDROCHLORIDE 30 MG/1
CAPSULE, DELAYED RELEASE ORAL
Qty: 30 CAPSULE | Refills: 4 | Status: SHIPPED | OUTPATIENT
Start: 2018-06-28 | End: 2018-07-16 | Stop reason: ALTCHOICE

## 2018-07-05 ENCOUNTER — PROCEDURE VISIT (OUTPATIENT)
Dept: OBGYN CLINIC | Facility: CLINIC | Age: 43
End: 2018-07-05
Payer: COMMERCIAL

## 2018-07-05 VITALS
BODY MASS INDEX: 39.79 KG/M2 | DIASTOLIC BLOOD PRESSURE: 84 MMHG | WEIGHT: 238.8 LBS | HEIGHT: 65 IN | SYSTOLIC BLOOD PRESSURE: 122 MMHG

## 2018-07-05 DIAGNOSIS — R87.612 LOW GRADE SQUAMOUS INTRAEPITHELIAL LESION ON CYTOLOGIC SMEAR OF CERVIX (LGSIL): Primary | ICD-10-CM

## 2018-07-05 PROCEDURE — 57455 BIOPSY OF CERVIX W/SCOPE: CPT | Performed by: OBSTETRICS & GYNECOLOGY

## 2018-07-05 PROCEDURE — 88305 TISSUE EXAM BY PATHOLOGIST: CPT | Performed by: PATHOLOGY

## 2018-07-05 RX ORDER — DICYCLOMINE HYDROCHLORIDE 10 MG/1
10 CAPSULE ORAL 3 TIMES DAILY
Refills: 3 | COMMUNITY
Start: 2018-06-13 | End: 2018-07-16 | Stop reason: ALTCHOICE

## 2018-07-05 NOTE — PROGRESS NOTES
Colposcopy  Date/Time: 7/5/2018 11:13 AM  Performed by: Lulu Terry  Authorized by: Lulu Terry     Consent:     Consent obtained:  Written    Consent given by:  Patient    Procedural risks discussed:  Bleeding and infection    Patient questions answered: yes      Patient agrees, verbalizes understanding, and wants to proceed: yes      Educational handouts given: no      Instructions and paperwork completed: yes    Pre-procedure:     Pre-procedure timeout performed: yes      Prepped with: acetic acid    Indication:     Indication:  LSIL and ASC-H  Procedure:     Procedure: Colposcopy w/ biopsy of cervix      Under satisfactory analgesia the patient was prepped and draped in the dorsal lithotomy position: yes      Aldie speculum was placed in the vagina: yes      Under colposcopic examination the transition zone was seen in entirety: yes      Intracervical block was performed: no      Cervical biopsy performed with a cervical biopsy punch: yes      Tampon inserted: no      Monsel's solution was applied: yes      Biopsy(s): yes      Location:  Biopsies taken at the 6 o'clock, 9 o'clock, 12 o'clock, and 3 o'clock positions  An ECC was also performed  Post-procedure:     Findings: Bleeding    Comments:      Colposcopy was complete  There was no obvious abnormalities  Random biopsy was taken at the 6 o'clock 9 o'clock 12 o'clock and 3:00 position  An ECC was also performed  Bleeding was controlled with Monsel's solution  The patient tolerated the procedure well  Impression this time negative findings  Will see results of colposcopy with returns  Instructions were given  Return my office in 2 weeks for discussion of pathology

## 2018-07-16 ENCOUNTER — OFFICE VISIT (OUTPATIENT)
Dept: BARIATRICS | Facility: CLINIC | Age: 43
End: 2018-07-16
Payer: COMMERCIAL

## 2018-07-16 ENCOUNTER — TELEPHONE (OUTPATIENT)
Dept: BARIATRICS | Facility: CLINIC | Age: 43
End: 2018-07-16

## 2018-07-16 VITALS
DIASTOLIC BLOOD PRESSURE: 80 MMHG | RESPIRATION RATE: 16 BRPM | HEIGHT: 65 IN | SYSTOLIC BLOOD PRESSURE: 120 MMHG | BODY MASS INDEX: 40.01 KG/M2 | HEART RATE: 98 BPM | TEMPERATURE: 97.5 F | WEIGHT: 240.13 LBS

## 2018-07-16 DIAGNOSIS — K58.0 IRRITABLE BOWEL SYNDROME WITH DIARRHEA: ICD-10-CM

## 2018-07-16 DIAGNOSIS — K76.0 FATTY LIVER: ICD-10-CM

## 2018-07-16 DIAGNOSIS — R73.03 PREDIABETES: Primary | ICD-10-CM

## 2018-07-16 DIAGNOSIS — E66.01 SEVERE OBESITY (BMI 35.0-39.9): ICD-10-CM

## 2018-07-16 DIAGNOSIS — F32.9 REACTIVE DEPRESSION: ICD-10-CM

## 2018-07-16 DIAGNOSIS — M25.50 ARTHRALGIA OF MULTIPLE SITES: ICD-10-CM

## 2018-07-16 PROCEDURE — 99204 OFFICE O/P NEW MOD 45 MIN: CPT | Performed by: PHYSICIAN ASSISTANT

## 2018-07-16 NOTE — TELEPHONE ENCOUNTER
Please call the patient and let her know that her temperature was a little low  I recommend she recheck it at home to see if it normalizes  If she experiences any sx of infection, she should have it evaluated  Thank you!

## 2018-07-16 NOTE — PATIENT INSTRUCTIONS
Goals: Food log (ie ) www myfitnesspal com,sparkpeople  com,loseit com,calorieking  com,etc  baritastic  No sugary beverages  At least 64oz of water daily  Increase physical activity by 10 minutes daily   Gradually increase physical activity to a goal of 5 days per week for 30 minutes of MODERATE intensity PLUS 2 days per week of FULL BODY resistance training  Limit caffeine to 16oz per day

## 2018-07-16 NOTE — ASSESSMENT & PLAN NOTE
-Discussed options of HealthyCORE-Intensive Lifestyle Intervention Program, Very Low Calorie Diet-VLCD and Conservative Program and the role of weight loss medications   -Reviewed indications for bariatric surgery (BMI 40 or higher)  -Initial weight loss goal of 5-10% weight loss for improved health  -Screening labs: reviewed and within acceptable limits  LDL mildly elevated  Can recheck at completion of HealthyCore/annually    -Patient is interested in pursuing Healthy[facility]  Not a candidate for meal replacement products due to nut allergy

## 2018-07-16 NOTE — ASSESSMENT & PLAN NOTE
-Last A1c 6 0 %  -lifestyle changes for now  -repeat after HealthyCore  -can consider metformin or Saxenda

## 2018-07-16 NOTE — PROGRESS NOTES
Assessment/Plan:    Severe obesity (BMI 35 0-39 9) ([facility] )  -Discussed options of HealthyCORE-Intensive Lifestyle Intervention Program, Very Low Calorie Diet-VLCD and Conservative Program and the role of weight loss medications   -Reviewed indications for bariatric surgery (BMI 40 or higher)  -Initial weight loss goal of 5-10% weight loss for improved health  -Screening labs: reviewed and within acceptable limits  LDL mildly elevated  Can recheck at completion of Healthy[facility]/annually    -Patient is interested in pursuing Healthy[facility]  Not a candidate for meal replacement products due to nut allergy  Fatty liver  -may improve with 5-10 % weight loss  -reviewed the benefit of a Mediterranean or plant based diet  Prediabetes  -Last A1c 6 0 %  -lifestyle changes for now  -repeat after HealthyCore  -can consider metformin or Saxenda    Arthralgia of multiple sites  -sx may improve with weight loss    Irritable bowel syndrome  -continue management with GI  -sx may improve with weight loss and dietary changes    Reactive depression  -somewhat controlled  -continue management with PCP  -she is planning on seeing counseling as well    Follow up for HealthyCore start and in approximately 3 months with Non-Surgical Physician/Advanced Practitioner  Goals:  Food log (ie ) www myfitnesspal com,sparkpeople  com,loseit com,calorieking  com,etc  baritastic  No sugary beverages  At least 64oz of water daily  Increase physical activity by 10 minutes daily   Gradually increase physical activity to a goal of 5 days per week for 30 minutes of MODERATE intensity PLUS 2 days per week of FULL BODY resistance training  Limit caffeine to 16oz per day    Diagnoses and all orders for this visit:    Prediabetes    Severe obesity (BMI 35 0-39 9) ([facility] )  -     Ambulatory referral to Weight Management    Irritable bowel syndrome with diarrhea    Fatty liver    Arthralgia of multiple sites    Reactive depression    Subjective:   Chief Complaint Patient presents with    Consult     Pt is here for MWM consult  Patient ID: Renetta Rodríguez  is a 44 y o  female with excess weight/obesity here to pursue weight management  Past Medical History:   Diagnosis Date    Arthritis     Hiatal hernia     HPV (human papilloma virus) infection     Irritable bowel syndrome     Prediabetes      HPI:  Obesity/Excess Weight:  Severity: Severe  Onset:  2012    Modifiers: Commercial Weight Loss Programs-ie  Weight Watchers, Simin Macadam, Nutrisystem, etc , Over the Counter Weight Loss Supplements and Curves  Contributing factors: Pregnancy and phenobarbital, which helped with IBS-D, but resulted in weight gain, insufficient time to make appropriate lifestyle changes, hx of gestation DM, divorce/depression during 3rd pregnancy  Associated symptoms: comorbid conditions and increased joint pain, increased SOB, IBS    Goals: prevent DM  Hydration: 2-3 bottles of water per day  Drinks dr Sara Rodríguez regularly  One large coffee with cream and sugar from [employer] +/- flavored syrups  Alcohol: 4-6 glasses of wine every other weekend    The following portions of the patient's history were reviewed and updated as appropriate: allergies, current medications, past family history, past medical history, past social history, past surgical history and problem list     Review of Systems   Constitutional: Negative for chills and fever  HENT: Positive for postnasal drip and sore throat (attributed to allergies)  Respiratory: Positive for shortness of breath (attributed to excess weight, recommend evaluation if sx persist/worsen)  Negative for cough  Cardiovascular: Positive for chest pain (states this is related to full body MS arthritis)  Negative for palpitations  Gastrointestinal: Positive for diarrhea (seeing GI) and nausea  Negative for constipation and vomiting  Genitourinary: Negative for dysuria  Musculoskeletal: Positive for arthralgias  Skin: Positive for rash (plans to see dermatology)  Skin lesion- plans to see dermatology   Neurological: Negative for headaches (hxof migraines- not sure if it is triggered by artficial sweeteners)  Psychiatric/Behavioral: Negative for suicidal ideas (Denies HI)  Depression improving with Cymbalta, managed by PCP  Plans to see counseling     Objective:    /80 (BP Location: Left arm, Patient Position: Sitting, Cuff Size: Large)   Pulse 98   Temp 97 5 °F (36 4 °C)   Resp 16   Ht 5' 5" (1 651 m)   Wt 109 kg (240 lb 2 oz)   BMI 39 96 kg/m²     Physical Exam   Nursing note and vitals reviewed  Constitutional   General appearance: Abnormal   well developed and obese  Eyes No conjunctival pallor  Ears, Nose, Mouth, and Throat Oral mucosa moist    Pulmonary   Respiratory effort: No increased work of breathing or signs of respiratory distress  Auscultation of lungs: Clear to auscultation, equal breath sounds bilaterally, no wheezes, no rales, no rhonci  Cardiovascular   Auscultation of heart: Normal rate and rhythm, normal S1 and S2, without murmurs  Examination of extremities for edema and/or varicosities: Normal   no edema  Abdomen   Abdomen: Abnormal   The abdomen was obese  Bowel sounds were normal  The abdomen was soft and nontender     Musculoskeletal   Gait and station: Normal     Psychiatric   Orientation to person, place and time: Normal     Affect: appropriate

## 2018-07-18 ENCOUNTER — OFFICE VISIT (OUTPATIENT)
Dept: BARIATRICS | Facility: CLINIC | Age: 43
End: 2018-07-18

## 2018-07-18 VITALS — WEIGHT: 239.6 LBS | HEIGHT: 65 IN | BODY MASS INDEX: 39.92 KG/M2

## 2018-07-18 DIAGNOSIS — R63.5 ABNORMAL WEIGHT GAIN: ICD-10-CM

## 2018-07-18 PROCEDURE — RECHECK

## 2018-07-18 PROCEDURE — WMPRO12

## 2018-07-18 NOTE — PROGRESS NOTES
Weight Management Medical Nutrition Assessment    Nesbit is here for initial RD visit for Healthy [facility]  Current wt: 239 6 lbs  Allergic to nuts  Wants to be able to control blood sugars and IBS-D symptoms  Excess calorie intake via sugary beverages (Dr Abril Rushing, large mocha coffee w/cream & sugar)  Discussed some ways to reduce calories & sugar via her beverages as she is currently drinking >500 calories  Does not like to cook, does so only because she has to and finds that they eat out more often than not  Struggles with depression, single mother, not sleeping more than 5 hours at night  Feels overwhelmed at the prospect of losing weight as she states she has finally accepted herself at her current size  Also reports having a lot of pain which is one of the contributing factors to her lack of sleep  She recently got back from vacation and wants to get into a habit of playing tennis or riding bike as she really enjoyed that  Suggested she make a goal that she will do one of those activities at least 1x/wk      Anthropometric Measurements  Start Weight (lbs):  239 6 lbs  Ideal Body Weight (lbs): 125 lbs  Goal Weight (lbs): no specific wt  Highest: 239 6 lbs  Lowest: 145 lbs    Weight Loss History  Previous weight loss attempts: Commercial Programs ([employer], David Fischer, etc )  OTC meds/supplements    Food and Nutrition Related History  Wake up: during school year 5-5:30, summer 9:30-10   Bed Time:very late & only sleeps 4 hours    Food Recall   :Breakfast: 7:30-9:30 school:2 eggo homestyle waffle, butter, syrup, cinnamon sugar, whipped cream, fruit OR DD bagel w/cream cheese  summer skip  Snack:skip  Lunch: 11 or 12:30 during school: smart ones, fruit OR 12 during summer: 2 eggo homestyle waffle, butter, syrup, cinnamon sugar, whipped cream, fruit OR DD bagel w/cream OR fage fruit non fat, fruit  Snack: 2:30-3:00 chocolate or chips (eating to stay awake) (will keep cheese cubes at work as needed) or skip Dinner: 7-8: 3 oz lean protein, 1/2 cup carb, 1/2 cup veggies  Snack: sometimes dessert 2 daphne's pb cup, few bites ice cream    Beverages: water, regular soda, coffee/tea and alcohol  Volume of beverage intake: 1 soda, large coffee, 1 soda    Weekends: Same  Cravings: chocolate  Trouble area of day: late afternoon  Frequency of Eating out: more frequently than not  Food restrictions:lactose, pineapple, nuts  Cooking: self  (hates cooking)  Food Shopping: self    Physical Activity Intake  Activity:nothing routine  Frequency:rarely  Physical limitations/barriers to exercise: time     Estimated Needs  Energy  [place]: CCG:2286      X 1 3 -1000 =1399  [place] Energy Needs: BMR : 6477   1-2# loss weekly sedentary:  2419-0595     1-2# loss weekly lightly active:4667-4864  Protein:68-85 gm      (1 2-1 5g/kg IBW)  Fluid: 66 oz     (35mL/kg IBW)    Nutrition Diagnosis  Yes; Overweight/obesity  related to Excess energy intake as evidenced by  BMI more than normative standard for age and sex (obesity-grade II 35-39  9)       Nutrition Intervention    Nutrition Prescription  Calories: 9577-1825 w/o sugary drinks; with reduction of drinks 3956-1856  Protein:>70 gm    Meal Plan  Breakfast: 340-465, 17-28  Snack: skip  Lunch: 360, ~15  Snack: 2-3 100-150, 5-10 then if needed 5-: 100-150, 5-10  Dinner: 375-480, 28-42  Snack: skip or food snack    Nutrition Education:    Healthy Core Manual  Calorie controlled menu  Lean protein food choices  Healthy snack options  Food journaling tips      Nutrition Counseling:  Strategies: meal planning, portion sizes, healthy snack choices, hydration, fiber intake, protein intake, exercise, food journal      Monitoring and Evaluation:  Evaluation criteria:  Energy Intake  Meet protein needs  Maintain adequate hydration  Monitor weekly weight  Meal planning/preparation  Food journal   Decreased portions at mealtimes and snacks  Physical activity     Barriers to learning:none  Readiness to change: Preparation  Comprehension: very good  Expected Compliance: good

## 2018-07-20 ENCOUNTER — OFFICE VISIT (OUTPATIENT)
Dept: OBGYN CLINIC | Facility: CLINIC | Age: 43
End: 2018-07-20
Payer: COMMERCIAL

## 2018-07-20 VITALS
WEIGHT: 242.4 LBS | SYSTOLIC BLOOD PRESSURE: 122 MMHG | HEIGHT: 65 IN | DIASTOLIC BLOOD PRESSURE: 84 MMHG | BODY MASS INDEX: 40.39 KG/M2

## 2018-07-20 DIAGNOSIS — R87.612 LOW GRADE SQUAMOUS INTRAEPITHELIAL LESION ON CYTOLOGIC SMEAR OF CERVIX (LGSIL): Primary | ICD-10-CM

## 2018-07-20 PROCEDURE — 99213 OFFICE O/P EST LOW 20 MIN: CPT | Performed by: OBSTETRICS & GYNECOLOGY

## 2018-07-20 NOTE — PATIENT INSTRUCTIONS
The patient was informed the results of the biopsy consistent with mild dysplasia  No treatment for now repeat Pap smear in 6 months

## 2018-07-20 NOTE — PROGRESS NOTES
This is a 60-year-old female [place] abnormal Pap smear  Colposcopy was performed  Biopsy was were consistent with mild dysplasia  This is explained to the patient  No treatment for now, repeat Pap smear in 6 months    All questions

## 2018-07-26 ENCOUNTER — CLINICAL SUPPORT (OUTPATIENT)
Dept: BARIATRICS | Facility: CLINIC | Age: 43
End: 2018-07-26

## 2018-07-26 VITALS — WEIGHT: 240.4 LBS | HEIGHT: 65 IN | BODY MASS INDEX: 40.05 KG/M2

## 2018-07-26 DIAGNOSIS — R63.5 ABNORMAL WEIGHT GAIN: Primary | ICD-10-CM

## 2018-07-26 PROCEDURE — RECHECK

## 2018-08-06 ENCOUNTER — OFFICE VISIT (OUTPATIENT)
Dept: BARIATRICS | Facility: CLINIC | Age: 43
End: 2018-08-06

## 2018-08-06 VITALS — BODY MASS INDEX: 39.69 KG/M2 | WEIGHT: 238.2 LBS | HEIGHT: 65 IN

## 2018-08-06 DIAGNOSIS — R63.5 ABNORMAL WEIGHT GAIN: Primary | ICD-10-CM

## 2018-08-06 PROCEDURE — RECHECK

## 2018-08-06 NOTE — PROGRESS NOTES
Weight Management Medical Nutrition Assessment    [facility] is here for f/u  Current wt: 238 2 lbs  Loss of 1 4 lbs x ~2 1/2 wks  Has been making many changes, using less cream & sugar when she has had coffee, eating less fast food etc  Purchasing healthier foods but not always consuming them  Concerned with going back to work and the routine of getting speciality coffees resuming  Suggestions given for making her own coffee at home and taking with  Has only had speciality coffee x 4 & no soda! Does have a large glass of orange juice in the morning  Goal to measure no more than 8 oz of this (currently having ~16 oz or more)  Tracking & consuming ~2163-6243 calories/day  Discussed more consistent ranges however the primary goal being keeping up with what she has been doing these last few weeks       Anthropometric Measurements  Start Weight (lbs):  239 6 lbs  Today: 238 2 lbs  Ideal Body Weight (lbs): 125 lbs  Goal Weight (lbs): no specific wt  Highest: 239 6 lbs  Lowest: 145 lbs    Weight Loss History  Previous weight loss attempts: Commercial Programs ([employer], Slim Rascon, etc )  OTC meds/supplements    Food and Nutrition Related History  Wake up: during school year 5-5:30, summer 9:30-10   Bed Time:very late & only sleeps 4 hours    Food Recall   :Breakfast: 7:30-9:30 school:2 eggo nutragrain waffle, 2 tsp butter, 2 tbsp syrup, cinnamon sugar, whipped cream, fruit OR [employer] bagel w/cream cheese  summer skip  Snack:skip  Lunch: 11 or 12:30 during school: smart ones, fruit OR 12 during summer: 2 eggo nutragrain waffle,2 tsp butter, 2 tbsp light syrup, cinnamon sugar, whipped cream, fruit    Snack: 2:30-3:00 skip  Dinner: 7-8: 3 oz lean protein, 1/2 cup carb, 1/2 cup veggies  Snack: sometimes dessert 2 daphne's pb cup, few bites ice cream    Beverages: water, regular soda, coffee/tea and alcohol  Volume of beverage intake:   large coffee x 4, >64 oz    Weekends: Same  Cravings: chocolate  Trouble area of day: late afternoon  Frequency of Eating out: more frequently than not  Food restrictions:lactose, pineapple, nuts  Cooking: self  (hates cooking)  Food Shopping: self    Physical Activity Intake  Activity:nothing routine  Frequency:rarely  Physical limitations/barriers to exercise: time     Estimated Needs  Energy  Niya: [id number]      X 1 3 -1000 =1399  [address]  [facility] Deborah Energy Needs: BMR : 0451   1-2# loss weekly sedentary:  6818-7791     1-2# loss weekly lightly active:9477-0942  Protein:68-85 gm      (1 2-1 5g/kg IBW)  Fluid: 66 oz     (35mL/kg IBW)    Nutrition Diagnosis  Yes; Overweight/obesity  related to Excess energy intake as evidenced by  BMI more than normative standard for age and sex (obesity-grade II 35-39  9)       Nutrition Intervention    Nutrition Prescription  Calories: 8916-7610 w/o sugary drinks; with reduction of drinks 1244-4036  Protein:>70 gm    Meal Plan  Breakfast: 340-465, 17-28  Snack: skip  Lunch: 360, ~15  Snack: 2-3 100-150, 5-10 then if needed 5-: 100-150, 5-10  Dinner: 375-480, 28-42  Snack: skip or food snack    Nutrition Education:    Healthy Core Manual  Calorie controlled menu  Lean protein food choices  Healthy snack options  Food journaling tips      Nutrition Counseling:  Strategies: meal planning, portion sizes, healthy snack choices, hydration, fiber intake, protein intake, exercise, food journal      Monitoring and Evaluation:  Evaluation criteria:  Energy Intake  Meet protein needs  Maintain adequate hydration  Monitor weekly weight  Meal planning/preparation  Food journal   Decreased portions at mealtimes and snacks  Physical activity     Barriers to learning:none  Readiness to change: Preparation  Comprehension: very good  Expected Compliance: good

## 2018-08-09 ENCOUNTER — CLINICAL SUPPORT (OUTPATIENT)
Dept: BARIATRICS | Facility: CLINIC | Age: 43
End: 2018-08-09

## 2018-08-09 VITALS — HEIGHT: 65 IN | WEIGHT: 238.2 LBS | BODY MASS INDEX: 39.69 KG/M2

## 2018-08-09 DIAGNOSIS — R63.5 ABNORMAL WEIGHT GAIN: Primary | ICD-10-CM

## 2018-08-09 PROCEDURE — RECHECK

## 2018-08-16 ENCOUNTER — CLINICAL SUPPORT (OUTPATIENT)
Dept: BARIATRICS | Facility: CLINIC | Age: 43
End: 2018-08-16

## 2018-08-16 VITALS — BODY MASS INDEX: 48.02 KG/M2 | HEIGHT: 65 IN | WEIGHT: 288.2 LBS

## 2018-08-16 DIAGNOSIS — R63.5 ABNORMAL WEIGHT GAIN: Primary | ICD-10-CM

## 2018-08-16 PROCEDURE — RECHECK

## 2018-08-23 ENCOUNTER — CLINICAL SUPPORT (OUTPATIENT)
Dept: BARIATRICS | Facility: CLINIC | Age: 43
End: 2018-08-23

## 2018-08-23 VITALS — WEIGHT: 242.2 LBS | HEIGHT: 65 IN | BODY MASS INDEX: 40.35 KG/M2

## 2018-08-23 DIAGNOSIS — R63.5 ABNORMAL WEIGHT GAIN: Primary | ICD-10-CM

## 2018-08-23 PROCEDURE — RECHECK

## 2018-09-06 ENCOUNTER — CLINICAL SUPPORT (OUTPATIENT)
Dept: BARIATRICS | Facility: CLINIC | Age: 43
End: 2018-09-06

## 2018-09-06 VITALS — WEIGHT: 242.2 LBS | BODY MASS INDEX: 40.35 KG/M2 | HEIGHT: 65 IN

## 2018-09-06 DIAGNOSIS — R63.5 ABNORMAL WEIGHT GAIN: Primary | ICD-10-CM

## 2018-09-06 PROCEDURE — RECHECK

## 2018-09-10 ENCOUNTER — OFFICE VISIT (OUTPATIENT)
Dept: BARIATRICS | Facility: CLINIC | Age: 43
End: 2018-09-10

## 2018-09-10 DIAGNOSIS — R63.5 ABNORMAL WEIGHT GAIN: Primary | ICD-10-CM

## 2018-09-10 DIAGNOSIS — K58.9 IRRITABLE BOWEL SYNDROME, UNSPECIFIED TYPE: Primary | ICD-10-CM

## 2018-09-10 PROCEDURE — RECHECK

## 2018-09-10 RX ORDER — DICYCLOMINE HYDROCHLORIDE 10 MG/1
CAPSULE ORAL
Qty: 270 CAPSULE | Refills: 2 | Status: SHIPPED | OUTPATIENT
Start: 2018-09-10 | End: 2019-07-27 | Stop reason: SDUPTHER

## 2018-09-10 NOTE — PROGRESS NOTES
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 so she admits to stress eating  She has been going to [employer] and ordering sugary coffee drinks and getting bagels  Suggestions given for making her own coffee at home and taking with  She is not racking & is most likely consuming additional calories  Discussed more consistent ranges however the primary goal being keeping up with what she has been doing these last few weeks and to get back on track  Anthropometric Measurements  Start Weight (lbs):  239 6 lbs  Today: 240 1 lbs  Ideal Body Weight (lbs): 125 lbs  Goal Weight (lbs): no specific wt  Highest: 239 6 lbs  Lowest: 145 lbs     Weight Loss History  Previous weight loss attempts: Commercial Programs ([employer], Simin Grace, etc )  OTC meds/supplements     Food and Nutrition Related History  Wake up: during school year 5-5:30, summer 9:30-10            Bed Time:very late & only sleeps 4 hours     Food Recall   :Breakfast: 7:30-9:30 school:Bagel  Lunch: 11 or 12:30 during school:  Whatever she can get  Snack: 2:30-3:00 skip  Dinner: 7-8: 3 oz lean protein, 1/2 cup carb, 1/2 cup veggies  Snack: sometimes dessert 2 daphne's pb cup, few bites ice cream     Beverages: water, regular soda, coffee/tea and alcohol  Volume of beverage intake:   large coffee x 4, >64 oz     Weekends: Same  Cravings: chocolate  Trouble area of day: late afternoon  Frequency of Eating out: more frequently than not  Food restrictions:lactose, pineapple, nuts  Cooking: self  (hates cooking)  Food Shopping: self     Physical Activity Intake  Activity:nothing routine  Frequency:rarely  Physical limitations/barriers to exercise: time      Estimated Needs  Energy  Tanita: [id number]      X 1 3 -1000 =1399  Bear Welch Energy Needs:  BMR : 0474   1-2# loss weekly sedentary:  7300-3127     1-2# loss weekly lightly active:7183-9565  Protein:68-85 gm      (1 2-1 5g/kg IBW)  Fluid: 66 oz     (35mL/kg IBW)     Nutrition Diagnosis  Yes; Overweight/obesity  related to Excess energy intake as evidenced by  BMI more than normative standard for age and sex (obesity-grade II 35-39  9)     Nutrition Intervention     Nutrition Prescription  Calories: 3961-6246 w/o sugary drinks; with reduction of drinks 3965-7235  Protein:>70 gm     Meal Plan  Breakfast: 397-538, 17-28  Snack: skip  Lunch: 360, ~15  Snack: 2-3 100-150, 5-10 then if needed 5-: 100-150, 5-10  Dinner: 375-480, 28-42  Snack: skip or food snack     Nutrition Education:    Healthy Core Manual  Calorie controlled menu  Lean protein food choices  Healthy snack options  Food journaling tips        Nutrition Counseling:  Strategies: meal planning, portion sizes, healthy snack choices, hydration, fiber intake, protein intake, exercise, food journal        Monitoring and Evaluation:  Evaluation criteria:  Energy Intake  Meet protein needs  Maintain adequate hydration  Monitor weekly weight  Meal planning/preparation  Food journal   Decreased portions at mealtimes and snacks  Physical activity      Barriers to learning:none  Readiness to change: Preparation  Comprehension: very good  Expected Compliance: good

## 2018-09-13 ENCOUNTER — CLINICAL SUPPORT (OUTPATIENT)
Dept: BARIATRICS | Facility: CLINIC | Age: 43
End: 2018-09-13

## 2018-09-13 VITALS — BODY MASS INDEX: 40.22 KG/M2 | HEIGHT: 65 IN | WEIGHT: 241.4 LBS

## 2018-09-13 DIAGNOSIS — R63.5 ABNORMAL WEIGHT GAIN: Primary | ICD-10-CM

## 2018-09-13 PROCEDURE — RECHECK

## 2018-09-20 ENCOUNTER — CLINICAL SUPPORT (OUTPATIENT)
Dept: BARIATRICS | Facility: CLINIC | Age: 43
End: 2018-09-20

## 2018-09-20 VITALS — BODY MASS INDEX: 40.35 KG/M2 | HEIGHT: 65 IN | WEIGHT: 242.2 LBS

## 2018-09-20 DIAGNOSIS — R63.5 ABNORMAL WEIGHT GAIN: Primary | ICD-10-CM

## 2018-09-20 PROCEDURE — RECHECK

## 2018-09-27 ENCOUNTER — CLINICAL SUPPORT (OUTPATIENT)
Dept: BARIATRICS | Facility: CLINIC | Age: 43
End: 2018-09-27

## 2018-09-27 VITALS — HEIGHT: 65 IN | BODY MASS INDEX: 40.45 KG/M2 | WEIGHT: 242.8 LBS

## 2018-09-27 DIAGNOSIS — R63.5 ABNORMAL WEIGHT GAIN: Primary | ICD-10-CM

## 2018-09-27 PROCEDURE — RECHECK

## 2018-10-04 ENCOUNTER — CLINICAL SUPPORT (OUTPATIENT)
Dept: BARIATRICS | Facility: CLINIC | Age: 43
End: 2018-10-04

## 2018-10-04 VITALS — HEIGHT: 65 IN | BODY MASS INDEX: 40.59 KG/M2 | WEIGHT: 243.6 LBS

## 2018-10-04 DIAGNOSIS — R63.5 ABNORMAL WEIGHT GAIN: Primary | ICD-10-CM

## 2018-10-04 PROCEDURE — RECHECK

## 2018-10-07 DIAGNOSIS — K29.60 EROSIVE GASTRITIS: ICD-10-CM

## 2018-10-08 RX ORDER — PANTOPRAZOLE SODIUM 40 MG/1
TABLET, DELAYED RELEASE ORAL
Qty: 30 TABLET | Refills: 4 | Status: SHIPPED | OUTPATIENT
Start: 2018-10-08 | End: 2019-03-21 | Stop reason: SDUPTHER

## 2018-10-17 NOTE — PROGRESS NOTES
Weight Management Medical Nutrition Assessment     Mariann Schaffer is here for f/u w/Tanita & REE  Current wt: 245 8 lbs  She has experienced a gain of 6 lbs x 3 months  Tanita shows fat gain of 14 4 lbs, muscle loss of 7 8 lbs  Visceral fat has increased to 14  REE 7%<predicted  Measured 1699 kcal vs 1832 kcal predicted  I had reached out to her after she reported on class papers that she is struggling but she did not get back into contact  Today she is very emotional, very stressed, sleeping poorly  She is aware of the reasons for her gain stating that she has gone back to large sugary coffees, bagels and added doughnuts  She is eating fast food frequently due to lack of time  Aware that she is turning to food for emotional reasons  Has never sought therapy but does agree that she may need to pursue this option  Referrals provided for therapy as well as our support group  She will work on cutting back her coffee and we will proceed from there  She will f/u with a bundle and sees PA in 2 wks  Would like to discuss weight loss medications at that time  Information provided so that she can check with insurance  She does not want to pursue surgery       Anthropometric Measurements  Start Weight (lbs):  239 6 lbs  Today: 245 8  lbs  Ideal Body Weight (lbs): 125 lbs  Goal Weight (lbs): no specific wt  Highest: 239 6 lbs  Lowest: 145 lbs     Weight Loss History  Previous weight loss attempts: Commercial Programs ([employer], Sherral Jules, etc )  OTC meds/supplements     Food and Nutrition Related History  Wake up: during school year 5-5:30, summer 9:30-10            Bed Time:very late & only sleeps 4 hours     Food Recall   Recall not completed     Beverages: water, regular soda, coffee/tea and alcohol  Volume of beverage intake:   large coffee x 4, >64 oz     Weekends: Same  Cravings: chocolate  Trouble area of day: late afternoon  Frequency of Eating out: more frequently than not  Food restrictions:lactose, pineapple, nuts  Cooking: self  (hates cooking)  Food Shopping: self     Physical Activity Intake  Activity:nothing routine  Frequency:rarely  Physical limitations/barriers to exercise: time      Estimated Needs  Energy  Tanita: [id number]      X 1 3 -1000 =1399  [address]  [facility] Deborah Energy Needs: BMR : 6164,   1-2# loss weekly sedentary:  5205-9984     1-2# loss weekly lightly active:6538-3928  Protein:68-85 gm      (1 2-1 5g/kg IBW)  Fluid: 66 oz     (35mL/kg IBW)     Nutrition Diagnosis  Yes;     Overweight/obesity  related to Excess energy intake as evidenced by  BMI 40 9    Nutrition Intervention     Nutrition Prescription  Calories: 5115-0865 w/o sugary drinks; with reduction of drinks 6810-2103  Protein:>70 gm     Meal Plan  Breakfast: 340-465, 17-28  Snack: skip  Lunch: 360, ~15  Snack: 2-3 100-150, 5-10 then if needed 5-: 100-150, 5-10  Dinner: 375-480, 28-42  Snack: skip or food snack     Nutrition Education:    Healthy Core Manual  Calorie controlled menu  Lean protein food choices  Healthy snack options  Food journaling tips        Nutrition Counseling:  Strategies: meal planning, portion sizes, healthy snack choices, hydration, fiber intake, protein intake, exercise, food journal        Monitoring and Evaluation:  Evaluation criteria:  Energy Intake  Meet protein needs  Maintain adequate hydration  Monitor weekly weight  Meal planning/preparation  Food journal   Decreased portions at mealtimes and snacks  Physical activity      Barriers to learning:none  Readiness to change: Preparation  Comprehension: very good  Expected Compliance: good

## 2018-10-18 ENCOUNTER — OFFICE VISIT (OUTPATIENT)
Dept: BARIATRICS | Facility: CLINIC | Age: 43
End: 2018-10-18

## 2018-10-18 ENCOUNTER — CLINICAL SUPPORT (OUTPATIENT)
Dept: BARIATRICS | Facility: CLINIC | Age: 43
End: 2018-10-18

## 2018-10-18 VITALS — WEIGHT: 245.8 LBS | BODY MASS INDEX: 40.95 KG/M2 | HEIGHT: 65 IN

## 2018-10-18 VITALS — WEIGHT: 247.8 LBS | HEIGHT: 65 IN | BODY MASS INDEX: 41.29 KG/M2

## 2018-10-18 DIAGNOSIS — R63.5 ABNORMAL WEIGHT GAIN: Primary | ICD-10-CM

## 2018-10-18 PROCEDURE — RECHECK

## 2018-10-20 DIAGNOSIS — M79.7 FIBROMYALGIA: ICD-10-CM

## 2018-10-22 RX ORDER — DULOXETIN HYDROCHLORIDE 60 MG/1
60 CAPSULE, DELAYED RELEASE ORAL DAILY
Qty: 30 CAPSULE | Refills: 5 | Status: SHIPPED | OUTPATIENT
Start: 2018-10-22 | End: 2019-04-19 | Stop reason: SDUPTHER

## 2018-11-20 ENCOUNTER — OFFICE VISIT (OUTPATIENT)
Dept: BARIATRICS | Facility: CLINIC | Age: 43
End: 2018-11-20
Payer: COMMERCIAL

## 2018-11-20 VITALS
HEIGHT: 65 IN | SYSTOLIC BLOOD PRESSURE: 130 MMHG | HEART RATE: 87 BPM | BODY MASS INDEX: 41.44 KG/M2 | DIASTOLIC BLOOD PRESSURE: 70 MMHG | RESPIRATION RATE: 16 BRPM | TEMPERATURE: 97.1 F | WEIGHT: 248.7 LBS

## 2018-11-20 DIAGNOSIS — E66.01 MORBID OBESITY WITH BMI OF 40.0-44.9, ADULT (HCC): Primary | ICD-10-CM

## 2018-11-20 DIAGNOSIS — R73.03 PREDIABETES: ICD-10-CM

## 2018-11-20 PROBLEM — F32.1 CURRENT MODERATE EPISODE OF MAJOR DEPRESSIVE DISORDER (HCC): Status: ACTIVE | Noted: 2018-01-18

## 2018-11-20 PROCEDURE — 99214 OFFICE O/P EST MOD 30 MIN: CPT | Performed by: PHYSICIAN ASSISTANT

## 2018-11-20 RX ORDER — DULOXETIN HYDROCHLORIDE 30 MG/1
CAPSULE, DELAYED RELEASE ORAL
COMMUNITY
Start: 2018-11-16 | End: 2019-06-17 | Stop reason: CLARIF

## 2018-11-20 NOTE — ASSESSMENT & PLAN NOTE
-Patient is pursuing [facility] Lifestyle Intervention Program - completed  -Initial weight loss goal of 5-10% weight loss for improved health  Has had weight gain  -Reports she enjoyed the program but has not but implementing many changes  She has been overwhelmed by several life stressors and lack of time to dedicate to herself  She is struggling with underlying depression and quite tearful throughout the entire visit  I informed her that I would like her to speak to her family physician regarding her depression as until that is addressed, she likely will continue to lack motivation to be consistent with lifestyle changes  She understands the health ramifications of unhealthy and excess weight and is fearful of medical conditions that may develop in the future if she does not take action  She is strongly considering seeing behavioral health which was recommended to her by the RD at her last appointment   -She has interest in weight loss medications which were discussed  Again emphasized importance of dietary and lifestyle changes for long term success  Would not utilize phentermine at this time due to her emotional state  Would be cautious with Wellbutrin/Wellbutrin containing medications due to dose of her Cymbalta and would defer to her PCP as that is the managing provider for her depression   Could consider metformin or Saxenda which she will check coverage for - SE profiled discussed  -Discussed meal planning/prepping to avoid long hours in between meals and choosing unhealthy options while out on the road which she will try and work on     Initial: 240 2 lbs  Current: 248 7 lbs  Change: + 8 5 lbs

## 2018-11-20 NOTE — ASSESSMENT & PLAN NOTE
-on cymbalta  -recommend follow up PCP to discuss additional agents as does not seem to be well controlled  Very tearful through visit   Denies SI/HI

## 2018-11-20 NOTE — PROGRESS NOTES
Assessment/Plan: Morbid obesity with BMI of 40 0-44 9, adult [facility])  -Patient is pursuing [facility] Lifestyle Intervention Program - completed  -Initial weight loss goal of 5-10% weight loss for improved health  Has had weight gain  -Reports she enjoyed the program but has not but implementing many changes  She has been overwhelmed by several life stressors and lack of time to dedicate to herself  She is struggling with underlying depression and quite tearful throughout the entire visit  I informed her that I would like her to speak to her family physician regarding her depression as until that is addressed, she likely will continue to lack motivation to be consistent with lifestyle changes  She understands the health ramifications of unhealthy and excess weight and is fearful of medical conditions that may develop in the future if she does not take action  She is strongly considering seeing behavioral health which was recommended to her by the RD at her last appointment   -She has interest in weight loss medications which were discussed  Again emphasized importance of dietary and lifestyle changes for long term success  Would not utilize phentermine at this time due to her emotional state  Would be cautious with Wellbutrin/Wellbutrin containing medications due to dose of her Cymbalta and would defer to her PCP as that is the managing provider for her depression  Could consider metformin or Saxenda which she will check coverage for - SE profiled discussed  -Discussed meal planning/prepping to avoid long hours in between meals and choosing unhealthy options while out on the road which she will try and work on     Initial: 240 2 lbs  Current: 248 7 lbs  Change: + 8 5 lbs      Current moderate episode of major depressive disorder ([address] )  -on cymbalta  -recommend follow up PCP to discuss additional agents as does not seem to be well controlled  Very tearful through visit   Denies SI/HI    Prediabetes  -recommend avoiding refined carbohydrates  -discussed metformin and Saxenda - she will check coverage of Saxenda    Goals:    Food log (ie ) www myfitnesspal com,sparkpeople  com,loseit com,calorieking  com,etc    No sugary beverages  At least 64oz of water daily  Increase physical activity by 10 minutes daily  Gradually increase physical activity to a goal of 5 days per week for 30 minutes of MODERATE intensity PLUS 2 days per week of FULL BODY resistance training  5900-8153 calories per day  5-10 servings of fruits and vegetables per day  Please discuss antidepressant with PCP  Please avoid Santino donuts for breakfast/lunch    Follow up in approximately 6-8 weeks with Non-Surgical Physician/Advanced Practitioner  25 minute visit, >50% face-to-face time spent counseling patient on diet behavior and exercise modification for weight loss  Diagnoses and all orders for this visit:    Morbid obesity with BMI of 40 0-44 9, adult ([facility] [address] )    Prediabetes    Other orders  -     DULoxetine (CYMBALTA) 30 mg delayed release capsule;           Subjective:   Chief Complaint   Patient presents with    Follow-up     MWM follow up         Patient ID: Mirza Araujo  is a 44 y o  female with excess weight/obesity here to pursue weight managment  Patient is pursuing  Conservative program    HPI Patient presents for MWM follow up  Completed [facility]  Unable to remain consistent with lifestyle changes due to dealing with several stressors and time restraint  Wants to make changes but lacks time and motivation  Strongly considering seeing [address]  The following portions of the patient's history were reviewed and updated as appropriate: allergies, current medications, past family history, past medical history, past social history, past surgical history and problem list     Review of Systems   Respiratory: Negative for cough and shortness of breath      Cardiovascular: Negative for chest pain and palpitations  Gastrointestinal: Positive for diarrhea (chronic)  Negative for abdominal pain  Psychiatric/Behavioral:        Reports depression  Denies SI/HI       Objective:    /70 (BP Location: Left arm, Patient Position: Sitting, Cuff Size: Adult)   Pulse 87   Temp (!) 97 1 °F (36 2 °C) (Tympanic)   Resp 16   Ht 5' 5" (1 651 m)   Wt 113 kg (248 lb 11 2 oz)   BMI 41 39 kg/m²      Physical Exam   Constitutional: She is oriented to person, place, and time  She appears well-developed and well-nourished  HENT:   Head: Normocephalic  Pulmonary/Chest: Effort normal    Musculoskeletal: Normal range of motion  Neurological: She is alert and oriented to person, place, and time  Psychiatric: Her behavior is normal  Judgment normal    Tearful, upset   Nursing note and vitals reviewed

## 2018-11-20 NOTE — ASSESSMENT & PLAN NOTE
-recommend avoiding refined carbohydrates  -discussed metformin and Saxenda - she will check coverage of Saxenda

## 2018-11-20 NOTE — PATIENT INSTRUCTIONS
Goals: Food log (ie ) www myfitnesspal com,sparkpeople  com,loseit com,calorieking  com,etc    No sugary beverages  At least 64oz of water daily  Increase physical activity by 10 minutes daily   Gradually increase physical activity to a goal of 5 days per week for 30 minutes of MODERATE intensity PLUS 2 days per week of FULL BODY resistance training  6208-4905 calories per day  5-10 servings of fruits and vegetables per day  Please discuss antidepressant with PCP  Please avoid Santino for breakfast/lunch

## 2018-11-26 ENCOUNTER — OFFICE VISIT (OUTPATIENT)
Dept: BARIATRICS | Facility: CLINIC | Age: 43
End: 2018-11-26

## 2018-11-26 VITALS — HEIGHT: 65 IN | BODY MASS INDEX: 41.37 KG/M2 | WEIGHT: 248.3 LBS

## 2018-11-26 DIAGNOSIS — R63.5 ABNORMAL WEIGHT GAIN: ICD-10-CM

## 2018-11-26 PROCEDURE — DB6PK

## 2018-11-26 PROCEDURE — RECHECK

## 2018-11-26 NOTE — PROGRESS NOTES
Weight Management Medical Nutrition Assessment     Benigno Kumar is here for f/u (1 of 6)  Current wt: 248 3 lbs  She has experienced a gain of 8 7 lbs x 4 months  Was in last week to see PA & at that time had a goal to stop going to [employer] for large coffees w/extra cream and sugar  She has not been to [employer] since last Wednesday  Wanted to stop today but did not  Does have a plan in place for items she would like to incorporate and has purchased those items  She also spoke with PCP and Cymbalta increased  Discuss importance of meal planning especially due to the fact that she does not get home until later in the evening  Suggest she try writing out a plan and sticking with it to see if it helps with her stress level  States that it is more difficult due to having 3 kids who all want different things  Gives an example that her 15 yr old son will not eat the meal unless it is "fancy"  Encouraged her to have him get involved in the kitchen so that he can see how some more "fancy" items are not realistic during the week as well as providing her some time with him one on one  Also suggested she look into the option of an instapot which may help as well  Eats lunch very early at work (10:45 a m ) and recognizes that 3-5 p m  Is a challenge for her  Encouraged a higher calorie snack at this time (200-300 calories) with examples provided  She will f/u in 1 month       Anthropometric Measurements  Start Weight (lbs):  239 6 lbs  Today: 245 8  lbs  Ideal Body Weight (lbs): 125 lbs  Goal Weight (lbs): no specific wt  Highest: 239 6 lbs  Lowest: 145 lbs     Weight Loss History  Previous weight loss attempts: Commercial Programs ([employer], Adriana Mini, etc )  OTC meds/supplements     Food and Nutrition Related History  Wake up: during school year 5-5:30, summer 9:30-10            Bed Time:very late & only sleeps 4 hours     Food Recall   B: 2 waffles, banana, syrup, butter, oj or skip on work day   S: skip   L:10:45 [employer] bagel w/cream cheese, large coffee  S:   D:7-8 varies (trying for protein, carb, veggies) but also eating out often     Beverages: water, regular soda few times per week, coffee/tea and alcohol  Volume of beverage intake:    40 oz     Weekends: Same  Cravings: chocolate  Trouble area of day: late afternoon  Frequency of Eating out: more frequently than not  Food restrictions:lactose, pineapple,walnuts  Cooking: self  (hates cooking)  Food Shopping: self     Physical Activity Intake  Activity:nothing routine  Frequency:rarely  Physical limitations/barriers to exercise: time      Estimated Needs  Energy   Bear Veronica Energy Needs: BMR : 7954,   1-2# loss weekly sedentary:  7865-6265        Protein:68-85 gm      (1 2-1 5g/kg IBW)  Fluid: 66 oz     (35mL/kg IBW)     Nutrition Diagnosis  Yes;     Overweight/obesity  related to Excess energy intake as evidenced by  BMI 41 3    Nutrition Intervention     Nutrition Prescription  Calories: ~1400  Protein:>70 gm     Meal Plan  Breakfast: 340-465, 17-28  Snack: skip  Lunch: 360, ~15  Snack: 2-3 200-300, 5-10  Dinner: 375-480, 28-42  Snack: skip or food snack     Nutrition Education:    Healthy Core Manual  Calorie controlled menu  Lean protein food choices  Healthy snack options  Food journaling tips  200-300 calorie mini meals        Nutrition Counseling:  Strategies: meal planning, portion sizes, healthy snack choices, hydration, fiber intake, protein intake, exercise, food journal        Monitoring and Evaluation:  Evaluation criteria:  Energy Intake  Meet protein needs  Maintain adequate hydration  Monitor weekly weight  Meal planning/preparation  Food journal   Decreased portions at mealtimes and snacks  Physical activity      Barriers to learning:none  Readiness to change: Preparation  Comprehension: very good  Expected Compliance: good

## 2018-12-16 DIAGNOSIS — F32.A DEPRESSION, UNSPECIFIED DEPRESSION TYPE: ICD-10-CM

## 2018-12-17 RX ORDER — DULOXETIN HYDROCHLORIDE 30 MG/1
CAPSULE, DELAYED RELEASE ORAL
Qty: 30 CAPSULE | Refills: 4 | Status: SHIPPED | OUTPATIENT
Start: 2018-12-17 | End: 2019-06-17 | Stop reason: CLARIF

## 2018-12-28 ENCOUNTER — OFFICE VISIT (OUTPATIENT)
Dept: BARIATRICS | Facility: CLINIC | Age: 43
End: 2018-12-28

## 2018-12-28 VITALS — HEIGHT: 65 IN | WEIGHT: 248.6 LBS | BODY MASS INDEX: 41.42 KG/M2

## 2018-12-28 DIAGNOSIS — R63.5 ABNORMAL WEIGHT GAIN: Primary | ICD-10-CM

## 2018-12-28 PROCEDURE — RECHECK

## 2018-12-28 NOTE — PROGRESS NOTES
Weight Management Medical Nutrition Assessment     Gaby Zelaya is here for f/u (2 of 6)  Current wt: 248 6 lbs  Has maintained weight over the holidays  Has been working on home renovations which has caused her to be much more active  Also eating out less  Still continues to have large DD coffee w/extra cream & sugar  She feels that she is addicted to it however she only has it on days she is working  Currently on josé break and has not had at all nor does she have it on the weekend  Suggest that she change her route to avoid DD but she states that she is already driving out of her way to go there  She is willing to try making coffee at home or at least ordering it from [employer] without the sugar  Also recognizes the importance of tracking  Overall feeling in a much better place than she was at last visit  Cymbalta was increased which has helped and the houseguest staying with her has moved out  She is interested in weight loss medications but has not yet contacted insurance  Recommend she do this prior to her next appointment with PA  She will see PA in 2 wks and myself in 1 month       Anthropometric Measurements  Start Weight (lbs):  239 6 lbs  Today: 248 6  lbs  Ideal Body Weight (lbs): 125 lbs  Goal Weight (lbs): no specific wt  Highest: 239 6 lbs  Lowest: 145 lbs     Weight Loss History  Previous weight loss attempts: Commercial Programs ([employer], [employer], etc )  OTC meds/supplements     Food and Nutrition Related History  Wake up: during school year 5-5:30, summer 9:00-10            Bed Time:very late & only sleeps 4 hours     Food Recall   B: 2 waffles, banana, syrup, butter, oj or skip on work day   S: skip   L:10:45 DD bagel w/cream cheese, large coffee OR fit bowl or smart ones  S: cottage cheese or yogurt or skip  D:7-8 varies (trying for protein, carb, veggies) but also eating out often  S: 2-3 reeses trees or snickers ice cream bar     Beverages: water, regular soda few times per week, coffee/tea and alcohol  Volume of beverage intake:    24-48 oz     Weekends: Same  Cravings: chocolate  Trouble area of day: late night vs 3-5 p m  In the past  Discuss healthier options   Frequency of Eating out: less often  Food restrictions:lactose, pineapple,walnuts  Cooking: self  (hates cooking)  Food Shopping: self     Physical Activity Intake  Activity:nothing routine  Frequency:rarely  Physical limitations/barriers to exercise: time      Estimated Needs  Energy   [employer] Needs: BMR : 5705,   1-2# loss weekly sedentary:  4285-6616        Protein:68-85 gm      (1 2-1 5g/kg IBW)  Fluid: 66 oz     (35mL/kg IBW)     Nutrition Diagnosis  Yes;     Overweight/obesity  related to Excess energy intake as evidenced by  BMI 41 3    Nutrition Intervention     Nutrition Prescription  Calories: ~1400  Protein:>70 gm     Meal Plan  Breakfast: 340-465, 17-28  Snack: skip  Lunch: 360, ~15  Snack: 2-3 200-300, 5-10  Dinner: 375-480, 28-42  Snack: skip or food snack     Nutrition Education:    Healthy Core Manual  Calorie controlled menu  Lean protein food choices  Healthy snack options  Food journaling tips  200-300 calorie mini meals        Nutrition Counseling:  Strategies: meal planning, portion sizes, healthy snack choices, hydration, fiber intake, protein intake, exercise, food journal        Monitoring and Evaluation:  Evaluation criteria:  Energy Intake  Meet protein needs  Maintain adequate hydration  Monitor weekly weight  Meal planning/preparation  Food journal   Decreased portions at mealtimes and snacks  Physical activity      Barriers to learning:none  Readiness to change: Preparation  Comprehension: very good  Expected Compliance: good

## 2019-01-22 ENCOUNTER — OFFICE VISIT (OUTPATIENT)
Dept: OBGYN CLINIC | Facility: CLINIC | Age: 44
End: 2019-01-22
Payer: COMMERCIAL

## 2019-01-22 VITALS
WEIGHT: 247 LBS | BODY MASS INDEX: 41.15 KG/M2 | DIASTOLIC BLOOD PRESSURE: 80 MMHG | SYSTOLIC BLOOD PRESSURE: 132 MMHG | HEIGHT: 65 IN

## 2019-01-22 DIAGNOSIS — R87.612 LGSIL ON PAP SMEAR OF CERVIX: Primary | ICD-10-CM

## 2019-01-22 PROCEDURE — 99213 OFFICE O/P EST LOW 20 MIN: CPT | Performed by: NURSE PRACTITIONER

## 2019-01-22 PROCEDURE — 87624 HPV HI-RISK TYP POOLED RSLT: CPT | Performed by: NURSE PRACTITIONER

## 2019-01-22 PROCEDURE — G0145 SCR C/V CYTO,THINLAYER,RESCR: HCPCS | Performed by: NURSE PRACTITIONER

## 2019-01-22 RX ORDER — AZITHROMYCIN 250 MG/1
TABLET, FILM COATED ORAL
Refills: 0 | COMMUNITY
Start: 2018-11-28 | End: 2019-06-17 | Stop reason: CLARIF

## 2019-01-22 NOTE — PROGRESS NOTES
Subjective     Mirza Araujo is a 44 y o  woman who comes in today for a  pap smear only  Her most recent annual exam was on 6/20/18  Her most recent Pap smear was on 6/20/18 and showed low-grade squamous intraepithelial neoplasia (LGSIL - encompassing HPV,mild dysplasia,JUDY I)  Contraception: tubal ligation    The following portions of the patient's history were reviewed and updated as appropriate: allergies, current medications, past family history, past medical history, past social history, past surgical history and problem list     Review of Systems  Pertinent items are noted in HPI       Objective     /80   Ht 5' 5" (1 651 m)   Wt 112 kg (247 lb)   LMP 12/29/2018   Breastfeeding? No   BMI 41 10 kg/m²   Pelvic Exam: external genitalia normal, vagina normal without discharge and cervix normal in appearance  Pap smear obtained  Assessment/Plan   Zurdoroyce Julian was seen today for gynecologic exam     Diagnoses and all orders for this visit:    LGSIL on Pap smear of cervix  -     Liquid-based pap, screening      Screening pap smear      Follow up in 6 months for your routine GYN exam

## 2019-01-24 LAB
HPV HR 12 DNA CVX QL NAA+PROBE: POSITIVE
HPV16 DNA CVX QL NAA+PROBE: NEGATIVE
HPV18 DNA CVX QL NAA+PROBE: POSITIVE

## 2019-01-28 ENCOUNTER — TELEPHONE (OUTPATIENT)
Dept: OBGYN CLINIC | Facility: CLINIC | Age: 44
End: 2019-01-28

## 2019-01-28 DIAGNOSIS — A59.9 TRICHOMONAS INFECTION: Primary | ICD-10-CM

## 2019-01-28 LAB
LAB AP GYN PRIMARY INTERPRETATION: NORMAL
Lab: NORMAL
PATH INTERP SPEC-IMP: NORMAL

## 2019-01-28 RX ORDER — METRONIDAZOLE 500 MG/1
500 TABLET ORAL EVERY 12 HOURS SCHEDULED
Qty: 14 TABLET | Refills: 0 | Status: SHIPPED | OUTPATIENT
Start: 2019-01-28 | End: 2019-02-04

## 2019-01-30 NOTE — TELEPHONE ENCOUNTER
Patient informed of positive Trich on pap  She will start Flagyl today  She is also aware of +HPV on pap  She had a colpo 6 months ago  Will repeat pap in 6 months  She will make arrangements for an appt for a test of cure in one month and pap in 6 months  She is aware that her partner needs to be treated for Trich as well  She should abstain from intercourse until both are treated and cleared

## 2019-03-21 DIAGNOSIS — K29.60 EROSIVE GASTRITIS: ICD-10-CM

## 2019-03-21 RX ORDER — PANTOPRAZOLE SODIUM 40 MG/1
40 TABLET, DELAYED RELEASE ORAL DAILY
Qty: 30 TABLET | Refills: 0 | Status: SHIPPED | OUTPATIENT
Start: 2019-03-21 | End: 2019-04-22 | Stop reason: SDUPTHER

## 2019-03-21 RX ORDER — PANTOPRAZOLE SODIUM 40 MG/1
TABLET, DELAYED RELEASE ORAL
Qty: 30 TABLET | Refills: 4 | OUTPATIENT
Start: 2019-03-21

## 2019-04-19 DIAGNOSIS — M79.7 FIBROMYALGIA: ICD-10-CM

## 2019-04-19 RX ORDER — DULOXETIN HYDROCHLORIDE 60 MG/1
60 CAPSULE, DELAYED RELEASE ORAL DAILY
Qty: 30 CAPSULE | Refills: 0 | Status: SHIPPED | OUTPATIENT
Start: 2019-04-19 | End: 2019-05-26 | Stop reason: SDUPTHER

## 2019-04-20 DIAGNOSIS — K29.60 EROSIVE GASTRITIS: ICD-10-CM

## 2019-04-20 RX ORDER — PANTOPRAZOLE SODIUM 40 MG/1
TABLET, DELAYED RELEASE ORAL
Qty: 30 TABLET | Refills: 0 | OUTPATIENT
Start: 2019-04-20

## 2019-04-22 RX ORDER — PANTOPRAZOLE SODIUM 40 MG/1
40 TABLET, DELAYED RELEASE ORAL DAILY
Qty: 30 TABLET | Refills: 0 | Status: SHIPPED | OUTPATIENT
Start: 2019-04-22 | End: 2019-06-17 | Stop reason: SDUPTHER

## 2019-05-26 DIAGNOSIS — M79.7 FIBROMYALGIA: ICD-10-CM

## 2019-05-26 DIAGNOSIS — K29.60 EROSIVE GASTRITIS: ICD-10-CM

## 2019-05-27 RX ORDER — PANTOPRAZOLE SODIUM 40 MG/1
TABLET, DELAYED RELEASE ORAL
Qty: 30 TABLET | Refills: 0 | OUTPATIENT
Start: 2019-05-27

## 2019-05-28 RX ORDER — DULOXETIN HYDROCHLORIDE 60 MG/1
CAPSULE, DELAYED RELEASE ORAL
Qty: 30 CAPSULE | Refills: 0 | Status: SHIPPED | OUTPATIENT
Start: 2019-05-28 | End: 2019-07-11 | Stop reason: SDUPTHER

## 2019-06-17 ENCOUNTER — APPOINTMENT (OUTPATIENT)
Dept: LAB | Facility: CLINIC | Age: 44
End: 2019-06-17
Payer: COMMERCIAL

## 2019-06-17 ENCOUNTER — OFFICE VISIT (OUTPATIENT)
Dept: INTERNAL MEDICINE CLINIC | Facility: CLINIC | Age: 44
End: 2019-06-17
Payer: COMMERCIAL

## 2019-06-17 VITALS
BODY MASS INDEX: 44.35 KG/M2 | HEART RATE: 91 BPM | HEIGHT: 65 IN | DIASTOLIC BLOOD PRESSURE: 82 MMHG | SYSTOLIC BLOOD PRESSURE: 138 MMHG | OXYGEN SATURATION: 97 % | WEIGHT: 266.2 LBS

## 2019-06-17 DIAGNOSIS — K29.60 EROSIVE GASTRITIS: ICD-10-CM

## 2019-06-17 DIAGNOSIS — K58.0 IRRITABLE BOWEL SYNDROME WITH DIARRHEA: ICD-10-CM

## 2019-06-17 DIAGNOSIS — M54.41 CHRONIC BILATERAL LOW BACK PAIN WITH BILATERAL SCIATICA: ICD-10-CM

## 2019-06-17 DIAGNOSIS — M54.42 CHRONIC BILATERAL LOW BACK PAIN WITH BILATERAL SCIATICA: ICD-10-CM

## 2019-06-17 DIAGNOSIS — K76.0 FATTY LIVER: Primary | ICD-10-CM

## 2019-06-17 DIAGNOSIS — R73.03 PREDIABETES: ICD-10-CM

## 2019-06-17 DIAGNOSIS — E66.01 MORBID OBESITY WITH BMI OF 40.0-44.9, ADULT (HCC): ICD-10-CM

## 2019-06-17 DIAGNOSIS — G89.29 CHRONIC BILATERAL LOW BACK PAIN WITH BILATERAL SCIATICA: ICD-10-CM

## 2019-06-17 DIAGNOSIS — F33.1 MODERATE EPISODE OF RECURRENT MAJOR DEPRESSIVE DISORDER (HCC): ICD-10-CM

## 2019-06-17 DIAGNOSIS — M79.7 FIBROMYALGIA: ICD-10-CM

## 2019-06-17 PROBLEM — K80.10 CHRONIC CALCULOUS CHOLECYSTITIS: Status: RESOLVED | Noted: 2018-01-31 | Resolved: 2019-06-17

## 2019-06-17 PROBLEM — K80.20 GALLSTONES: Status: RESOLVED | Noted: 2017-12-28 | Resolved: 2019-06-17

## 2019-06-17 LAB
ALBUMIN SERPL BCP-MCNC: 3.8 G/DL (ref 3.5–5)
ALP SERPL-CCNC: 90 U/L (ref 46–116)
ALT SERPL W P-5'-P-CCNC: 25 U/L (ref 12–78)
ANION GAP SERPL CALCULATED.3IONS-SCNC: 3 MMOL/L (ref 4–13)
AST SERPL W P-5'-P-CCNC: 15 U/L (ref 5–45)
BASOPHILS # BLD AUTO: 0.06 THOUSANDS/ΜL (ref 0–0.1)
BASOPHILS NFR BLD AUTO: 1 % (ref 0–1)
BILIRUB SERPL-MCNC: 0.26 MG/DL (ref 0.2–1)
BUN SERPL-MCNC: 8 MG/DL (ref 5–25)
CALCIUM SERPL-MCNC: 9.1 MG/DL (ref 8.3–10.1)
CHLORIDE SERPL-SCNC: 106 MMOL/L (ref 100–108)
CHOLEST SERPL-MCNC: 200 MG/DL (ref 50–200)
CO2 SERPL-SCNC: 29 MMOL/L (ref 21–32)
CREAT SERPL-MCNC: 0.72 MG/DL (ref 0.6–1.3)
EOSINOPHIL # BLD AUTO: 0.12 THOUSAND/ΜL (ref 0–0.61)
EOSINOPHIL NFR BLD AUTO: 1 % (ref 0–6)
ERYTHROCYTE [DISTWIDTH] IN BLOOD BY AUTOMATED COUNT: 14.5 % (ref 11.6–15.1)
EST. AVERAGE GLUCOSE BLD GHB EST-MCNC: 140 MG/DL
GFR SERPL CREATININE-BSD FRML MDRD: 104 ML/MIN/1.73SQ M
GLUCOSE SERPL-MCNC: 100 MG/DL (ref 65–140)
HBA1C MFR BLD: 6.5 % (ref 4.2–6.3)
HCT VFR BLD AUTO: 38 % (ref 34.8–46.1)
HDLC SERPL-MCNC: 38 MG/DL (ref 40–60)
HGB BLD-MCNC: 11.5 G/DL (ref 11.5–15.4)
IMM GRANULOCYTES # BLD AUTO: 0.08 THOUSAND/UL (ref 0–0.2)
IMM GRANULOCYTES NFR BLD AUTO: 1 % (ref 0–2)
LDLC SERPL CALC-MCNC: 137 MG/DL (ref 0–100)
LYMPHOCYTES # BLD AUTO: 2.32 THOUSANDS/ΜL (ref 0.6–4.47)
LYMPHOCYTES NFR BLD AUTO: 19 % (ref 14–44)
MCH RBC QN AUTO: 25.8 PG (ref 26.8–34.3)
MCHC RBC AUTO-ENTMCNC: 30.3 G/DL (ref 31.4–37.4)
MCV RBC AUTO: 85 FL (ref 82–98)
MONOCYTES # BLD AUTO: 0.91 THOUSAND/ΜL (ref 0.17–1.22)
MONOCYTES NFR BLD AUTO: 8 % (ref 4–12)
NEUTROPHILS # BLD AUTO: 8.62 THOUSANDS/ΜL (ref 1.85–7.62)
NEUTS SEG NFR BLD AUTO: 70 % (ref 43–75)
NONHDLC SERPL-MCNC: 162 MG/DL
NRBC BLD AUTO-RTO: 0 /100 WBCS
PLATELET # BLD AUTO: 389 THOUSANDS/UL (ref 149–390)
PMV BLD AUTO: 9.8 FL (ref 8.9–12.7)
POTASSIUM SERPL-SCNC: 4.3 MMOL/L (ref 3.5–5.3)
PROT SERPL-MCNC: 7.5 G/DL (ref 6.4–8.2)
RBC # BLD AUTO: 4.45 MILLION/UL (ref 3.81–5.12)
SODIUM SERPL-SCNC: 138 MMOL/L (ref 136–145)
T4 FREE SERPL-MCNC: 0.91 NG/DL (ref 0.76–1.46)
TRIGL SERPL-MCNC: 123 MG/DL
TSH SERPL DL<=0.05 MIU/L-ACNC: 1.99 UIU/ML (ref 0.36–3.74)
WBC # BLD AUTO: 12.11 THOUSAND/UL (ref 4.31–10.16)

## 2019-06-17 PROCEDURE — 84443 ASSAY THYROID STIM HORMONE: CPT

## 2019-06-17 PROCEDURE — 83036 HEMOGLOBIN GLYCOSYLATED A1C: CPT

## 2019-06-17 PROCEDURE — 84439 ASSAY OF FREE THYROXINE: CPT

## 2019-06-17 PROCEDURE — 85025 COMPLETE CBC W/AUTO DIFF WBC: CPT

## 2019-06-17 PROCEDURE — 36415 COLL VENOUS BLD VENIPUNCTURE: CPT

## 2019-06-17 PROCEDURE — 99215 OFFICE O/P EST HI 40 MIN: CPT | Performed by: INTERNAL MEDICINE

## 2019-06-17 PROCEDURE — 80061 LIPID PANEL: CPT

## 2019-06-17 PROCEDURE — 80053 COMPREHEN METABOLIC PANEL: CPT

## 2019-06-17 RX ORDER — PREDNISONE 10 MG/1
TABLET ORAL
Qty: 60 TABLET | Refills: 1 | Status: SHIPPED | OUTPATIENT
Start: 2019-06-17 | End: 2019-10-03 | Stop reason: CLARIF

## 2019-06-17 RX ORDER — PANTOPRAZOLE SODIUM 40 MG/1
40 TABLET, DELAYED RELEASE ORAL DAILY
Qty: 90 TABLET | Refills: 3 | Status: SHIPPED | OUTPATIENT
Start: 2019-06-17 | End: 2020-06-24

## 2019-06-20 ENCOUNTER — HOSPITAL ENCOUNTER (OUTPATIENT)
Dept: RADIOLOGY | Facility: HOSPITAL | Age: 44
Discharge: HOME | End: 2019-06-20
Payer: COMMERCIAL

## 2019-06-20 DIAGNOSIS — M54.42 CHRONIC BILATERAL LOW BACK PAIN WITH BILATERAL SCIATICA: ICD-10-CM

## 2019-06-20 DIAGNOSIS — G89.29 CHRONIC BILATERAL LOW BACK PAIN WITH BILATERAL SCIATICA: ICD-10-CM

## 2019-06-20 DIAGNOSIS — M54.41 CHRONIC BILATERAL LOW BACK PAIN WITH BILATERAL SCIATICA: ICD-10-CM

## 2019-06-20 PROCEDURE — 72110 X-RAY EXAM L-2 SPINE 4/>VWS: CPT

## 2019-07-02 ENCOUNTER — OFFICE VISIT (OUTPATIENT)
Dept: INTERNAL MEDICINE CLINIC | Facility: CLINIC | Age: 44
End: 2019-07-02
Payer: COMMERCIAL

## 2019-07-02 VITALS
HEIGHT: 65 IN | DIASTOLIC BLOOD PRESSURE: 88 MMHG | SYSTOLIC BLOOD PRESSURE: 142 MMHG | WEIGHT: 260.6 LBS | OXYGEN SATURATION: 96 % | HEART RATE: 87 BPM | BODY MASS INDEX: 43.42 KG/M2

## 2019-07-02 DIAGNOSIS — G89.29 CHRONIC BILATERAL LOW BACK PAIN WITH BILATERAL SCIATICA: ICD-10-CM

## 2019-07-02 DIAGNOSIS — M54.41 CHRONIC BILATERAL LOW BACK PAIN WITH BILATERAL SCIATICA: ICD-10-CM

## 2019-07-02 DIAGNOSIS — E11.9 TYPE 2 DIABETES MELLITUS WITHOUT COMPLICATION, WITHOUT LONG-TERM CURRENT USE OF INSULIN (HCC): Primary | ICD-10-CM

## 2019-07-02 DIAGNOSIS — E66.01 MORBID OBESITY WITH BMI OF 40.0-44.9, ADULT (HCC): ICD-10-CM

## 2019-07-02 DIAGNOSIS — E78.00 PURE HYPERCHOLESTEROLEMIA: ICD-10-CM

## 2019-07-02 DIAGNOSIS — M54.42 CHRONIC BILATERAL LOW BACK PAIN WITH BILATERAL SCIATICA: ICD-10-CM

## 2019-07-02 PROCEDURE — 99214 OFFICE O/P EST MOD 30 MIN: CPT | Performed by: INTERNAL MEDICINE

## 2019-07-02 PROCEDURE — 3008F BODY MASS INDEX DOCD: CPT | Performed by: INTERNAL MEDICINE

## 2019-07-02 RX ORDER — AMOXICILLIN 875 MG/1
TABLET, COATED ORAL
Refills: 0 | COMMUNITY
Start: 2019-06-26 | End: 2019-12-10 | Stop reason: CLARIF

## 2019-07-02 NOTE — PROGRESS NOTES
Assessment/Plan:  Regarding the upper respiratory infection she is getting better and will finish off her amoxicillin  Regarding her weight she is back to weight management  Regarding her blood sugars she has already started herself on low carbohydrate diet and will recheck her in 4 weeks  She will establish with a counselor for psychological support  She is also going to see the gastroenterologist     Will see her back in 4 weeks  1  Type 2 diabetes mellitus without complication, without long-term current use of insulin ([facility] )     2  Morbid obesity with BMI of 40 0-44 9, adult ([facility] )     3  Chronic bilateral low back pain with bilateral sciatica     4  Pure hypercholesterolemia         No orders of the defined types were placed in this encounter  Subjective:  She comes in for follow-up  She was on vacation  She is doing better  She did have a sinusitis and bronchitis  She was given amoxicillin by another doctor and is doing better  Labs showed a modest elevation of her white count probably secondary to infection  She is doing actually very well  A1c was elevated at 6 5 but she has lost 6 lb since I saw her by just her telling her carbohydrate intake  She is going to set up an appointment with a therapist     She is re-establishing with weight management  Her back pain is gone away  She had an x-ray which showed some minor arthritic changes  Patient ID: Kierra Foster is a 38 y o  female  HPI    The following portions of the patient's history were reviewed and updated as appropriate:   She has a past medical history of Arthritis, Hiatal hernia, HPV (human papilloma virus) infection, Irritable bowel syndrome, and Prediabetes  ,  does not have any pertinent problems on file  ,   has a past surgical history that includes  section; Colonoscopy; pr esophagogastroduodenoscopy transoral diagnostic (N/A, 2017); Tonsillectomy;  Tubal ligation; pr lap,cholecystectomy/graph (N/A, 2/9/2018); Adenoidectomy; and Cervical biopsy  ,  family history includes Arthritis in her mother; COPD in her paternal grandmother; Diabetes in her father; Diabetes type II in her father; Endometriosis in her mother; Esophageal cancer in her maternal grandfather; Fibromyalgia in her mother; Heart disease in her paternal grandmother; Hypertension in her father; Hyperthyroidism in her paternal grandmother; Other in her father and mother; Pancreatic cancer in her maternal uncle ,   reports that she has been smoking  She has never used smokeless tobacco  She reports that she drinks about 1 0 standard drinks of alcohol per week  She reports that she does not use drugs  ,  is allergic to nuts; other; desogestrel-ethinyl estradiol; pineapple; dog epithelium allergy skin test; dust mite extract; and ortho tri-cyclen (28) [norgestimate-eth estradiol]       Current Outpatient Medications:     acetaminophen (TYLENOL) 325 mg tablet, Take 325 mg by mouth every 6 (six) hours as needed for mild pain  , Disp: , Rfl:     amoxicillin (AMOXIL) 875 mg tablet, TAKE 1 TABLET BY MOUTH EVERY 12 HOURS FOR 10 DAYS, Disp: , Rfl: 0    Cetirizine HCl (ZYRTEC ALLERGY) 10 MG CAPS, Take by mouth daily  , Disp: , Rfl:     dicyclomine (BENTYL) 10 mg capsule, TAKE 1 CAPSULE BY MOUTH 3 TIMES DAILY  , Disp: 270 capsule, Rfl: 2    DULoxetine (CYMBALTA) 60 mg delayed release capsule, TAKE 1 CAPSULE BY MOUTH EVERY DAY, Disp: 30 capsule, Rfl: 0    montelukast (SINGULAIR) 10 mg tablet, Take 10 mg by mouth daily at bedtime, Disp: , Rfl:     olopatadine HCl (PATADAY) 0 2 % opth drops, Apply 0 2 % to eye as needed  , Disp: , Rfl:     pantoprazole (PROTONIX) 40 mg tablet, Take 1 tablet (40 mg total) by mouth daily, Disp: 90 tablet, Rfl: 3    predniSONE 10 mg tablet, Take 4 tablets daily for 3 days then take 3 tablets daily for 3 days then take 2 tablets daily for 3 days then take 1 tablet daily for 3 days then discontinue, Disp: 60 tablet, Rfl: 1    Review of Systems   Constitutional: Positive for unexpected weight change  Negative for activity change, appetite change, chills, diaphoresis, fatigue and fever  HENT: Negative for congestion, ear pain, hearing loss, mouth sores, nosebleeds, postnasal drip, sinus pressure, sinus pain, sore throat and trouble swallowing  Eyes: Negative for pain, discharge and visual disturbance  Respiratory: Positive for cough  Negative for apnea, chest tightness, shortness of breath and wheezing  Cardiovascular: Negative for chest pain, palpitations and leg swelling  Gastrointestinal: Negative for abdominal pain, anal bleeding, blood in stool, constipation, diarrhea, nausea and vomiting  Endocrine: Negative for polydipsia and polyphagia  Genitourinary: Negative for decreased urine volume, dysuria, flank pain, frequency, hematuria and urgency  Musculoskeletal: Negative for arthralgias, back pain, gait problem, joint swelling and myalgias  Skin: Negative for rash and wound  Allergic/Immunologic: Negative for environmental allergies and food allergies  Neurological: Negative for dizziness, tremors, seizures, syncope, speech difficulty, light-headedness, numbness and headaches  Hematological: Negative for adenopathy  Does not bruise/bleed easily  Psychiatric/Behavioral: Negative for agitation, confusion, hallucinations, sleep disturbance and suicidal ideas  The patient is nervous/anxious  Objective:  /88 (BP Location: Right arm, Patient Position: Sitting, Cuff Size: Standard)   Pulse 87   Ht 5' 5" (1 651 m)   Wt 118 kg (260 lb 9 6 oz)   SpO2 96%   BMI 43 37 kg/m²      Physical Exam   Constitutional: She appears well-developed  No distress  She is overweight  Blood pressure is 134/86  Temperature is 98 3°  Heart rhythm is regular  Rate is 80  HENT:   Head: Normocephalic     Right Ear: External ear normal    Left Ear: External ear normal    Nose: Nose normal    Mouth/Throat: Oropharynx is clear and moist  No oropharyngeal exudate  Both tympanic membranes are dull  Posterior pharynx mildly hyperemic  Eyes: Pupils are equal, round, and reactive to light  Conjunctivae and EOM are normal  Right eye exhibits no discharge  Left eye exhibits no discharge  Neck: Normal range of motion  Neck supple  No thyromegaly present  Cardiovascular: Normal rate, regular rhythm, normal heart sounds and intact distal pulses  Exam reveals no gallop and no friction rub  No murmur heard  Pulmonary/Chest: Effort normal and breath sounds normal  No respiratory distress  She has no wheezes  She has no rales  Abdominal: Soft  Bowel sounds are normal  She exhibits no distension and no mass  There is no tenderness  There is no rebound and no guarding  Abdomen is obese soft nontender with no masses  Musculoskeletal: Normal range of motion  She exhibits no edema, tenderness or deformity  Lymphadenopathy:     She has no cervical adenopathy  Neurological: She is alert  She has normal reflexes  She displays normal reflexes  No cranial nerve deficit  Coordination normal    Skin: Skin is warm and dry  No rash noted  No erythema  Psychiatric: She has a normal mood and affect  Her behavior is normal  Judgment and thought content normal    Nursing note and vitals reviewed          Recent Results (from the past 1008 hour(s))   CBC and differential    Collection Time: 06/17/19  4:05 PM   Result Value Ref Range    WBC 12 11 (H) 4 31 - 10 16 Thousand/uL    RBC 4 45 3 81 - 5 12 Million/uL    Hemoglobin 11 5 11 5 - 15 4 g/dL    Hematocrit 38 0 34 8 - 46 1 %    MCV 85 82 - 98 fL    MCH 25 8 (L) 26 8 - 34 3 pg    MCHC 30 3 (L) 31 4 - 37 4 g/dL    RDW 14 5 11 6 - 15 1 %    MPV 9 8 8 9 - 12 7 fL    Platelets 629 874 - 853 Thousands/uL    nRBC 0 /100 WBCs    Neutrophils Relative 70 43 - 75 %    Immat GRANS % 1 0 - 2 %    Lymphocytes Relative 19 14 - 44 %    Monocytes Relative 8 4 - 12 % Eosinophils Relative 1 0 - 6 %    Basophils Relative 1 0 - 1 %    Neutrophils Absolute 8 62 (H) 1 85 - 7 62 Thousands/µL    Immature Grans Absolute 0 08 0 00 - 0 20 Thousand/uL    Lymphocytes Absolute 2 32 0 60 - 4 47 Thousands/µL    Monocytes Absolute 0 91 0 17 - 1 22 Thousand/µL    Eosinophils Absolute 0 12 0 00 - 0 61 Thousand/µL    Basophils Absolute 0 06 0 00 - 0 10 Thousands/µL   Comprehensive metabolic panel    Collection Time: 06/17/19  4:05 PM   Result Value Ref Range    Sodium 138 136 - 145 mmol/L    Potassium 4 3 3 5 - 5 3 mmol/L    Chloride 106 100 - 108 mmol/L    CO2 29 21 - 32 mmol/L    ANION GAP 3 (L) 4 - 13 mmol/L    BUN 8 5 - 25 mg/dL    Creatinine 0 72 0 60 - 1 30 mg/dL    Glucose 100 65 - 140 mg/dL    Calcium 9 1 8 3 - 10 1 mg/dL    AST 15 5 - 45 U/L    ALT 25 12 - 78 U/L    Alkaline Phosphatase 90 46 - 116 U/L    Total Protein 7 5 6 4 - 8 2 g/dL    Albumin 3 8 3 5 - 5 0 g/dL    Total Bilirubin 0 26 0 20 - 1 00 mg/dL    eGFR 104 ml/min/1 73sq m   HEMOGLOBIN A1C W/ EAG ESTIMATION    Collection Time: 06/17/19  4:05 PM   Result Value Ref Range    Hemoglobin A1C 6 5 (H) 4 2 - 6 3 %     mg/dl   Lipid panel    Collection Time: 06/17/19  4:05 PM   Result Value Ref Range    Cholesterol 200 50 - 200 mg/dL    Triglycerides 123 <=150 mg/dL    HDL, Direct 38 (L) 40 - 60 mg/dL    LDL Calculated 137 (H) 0 - 100 mg/dL    Non-HDL-Chol (CHOL-HDL) 162 mg/dl   TSH, 3rd generation    Collection Time: 06/17/19  4:05 PM   Result Value Ref Range    TSH 3RD GENERATON 1 990 0 358 - 3 740 uIU/mL   T4, free    Collection Time: 06/17/19  4:05 PM   Result Value Ref Range    Free T4 0 91 0 76 - 1 46 ng/dL

## 2019-07-11 DIAGNOSIS — M79.7 FIBROMYALGIA: ICD-10-CM

## 2019-07-11 RX ORDER — DULOXETIN HYDROCHLORIDE 60 MG/1
CAPSULE, DELAYED RELEASE ORAL
Qty: 30 CAPSULE | Refills: 0 | Status: SHIPPED | OUTPATIENT
Start: 2019-07-11 | End: 2019-08-14 | Stop reason: SDUPTHER

## 2019-07-27 DIAGNOSIS — K58.9 IRRITABLE BOWEL SYNDROME, UNSPECIFIED TYPE: ICD-10-CM

## 2019-07-29 RX ORDER — DICYCLOMINE HYDROCHLORIDE 10 MG/1
CAPSULE ORAL
Qty: 90 CAPSULE | Refills: 8 | Status: SHIPPED | OUTPATIENT
Start: 2019-07-29 | End: 2020-05-26

## 2019-08-14 DIAGNOSIS — M79.7 FIBROMYALGIA: ICD-10-CM

## 2019-08-14 RX ORDER — DULOXETIN HYDROCHLORIDE 60 MG/1
CAPSULE, DELAYED RELEASE ORAL
Qty: 30 CAPSULE | Refills: 0 | Status: SHIPPED | OUTPATIENT
Start: 2019-08-14 | End: 2019-09-02 | Stop reason: SDUPTHER

## 2019-09-02 DIAGNOSIS — M79.7 FIBROMYALGIA: ICD-10-CM

## 2019-09-03 RX ORDER — DULOXETIN HYDROCHLORIDE 60 MG/1
CAPSULE, DELAYED RELEASE ORAL
Qty: 30 CAPSULE | Refills: 0 | Status: SHIPPED | OUTPATIENT
Start: 2019-09-03 | End: 2019-10-07 | Stop reason: SDUPTHER

## 2019-10-03 ENCOUNTER — OFFICE VISIT (OUTPATIENT)
Dept: INTERNAL MEDICINE CLINIC | Facility: CLINIC | Age: 44
End: 2019-10-03
Payer: COMMERCIAL

## 2019-10-03 VITALS
HEIGHT: 65 IN | SYSTOLIC BLOOD PRESSURE: 136 MMHG | HEART RATE: 85 BPM | DIASTOLIC BLOOD PRESSURE: 80 MMHG | WEIGHT: 262 LBS | BODY MASS INDEX: 43.65 KG/M2

## 2019-10-03 DIAGNOSIS — E11.9 TYPE 2 DIABETES MELLITUS WITHOUT COMPLICATION, WITHOUT LONG-TERM CURRENT USE OF INSULIN (HCC): Primary | ICD-10-CM

## 2019-10-03 DIAGNOSIS — Z12.39 SCREENING FOR BREAST CANCER: ICD-10-CM

## 2019-10-03 DIAGNOSIS — J20.9 ACUTE BRONCHITIS, UNSPECIFIED ORGANISM: ICD-10-CM

## 2019-10-03 LAB
LEFT EYE DIABETIC RETINOPATHY: NORMAL
LEFT EYE IMAGE QUALITY: NORMAL
LEFT EYE MACULAR EDEMA: NORMAL
LEFT EYE OTHER RETINOPATHY: NORMAL
RIGHT EYE DIABETIC RETINOPATHY: NORMAL
RIGHT EYE IMAGE QUALITY: NORMAL
RIGHT EYE MACULAR EDEMA: NORMAL
RIGHT EYE OTHER RETINOPATHY: NORMAL
SEVERITY (EYE EXAM): NORMAL

## 2019-10-03 PROCEDURE — 3008F BODY MASS INDEX DOCD: CPT | Performed by: INTERNAL MEDICINE

## 2019-10-03 PROCEDURE — 92250 FUNDUS PHOTOGRAPHY W/I&R: CPT | Performed by: INTERNAL MEDICINE

## 2019-10-03 PROCEDURE — 99214 OFFICE O/P EST MOD 30 MIN: CPT | Performed by: INTERNAL MEDICINE

## 2019-10-03 RX ORDER — FLUTICASONE PROPIONATE 50 MCG
1 SPRAY, SUSPENSION (ML) NASAL DAILY
COMMUNITY
End: 2021-09-03

## 2019-10-03 RX ORDER — PREDNISONE 20 MG/1
20 TABLET ORAL 2 TIMES DAILY WITH MEALS
COMMUNITY
End: 2019-12-10 | Stop reason: CLARIF

## 2019-10-03 RX ORDER — BENZONATATE 100 MG/1
100 CAPSULE ORAL 3 TIMES DAILY PRN
Qty: 30 CAPSULE | Refills: 0 | Status: SHIPPED | OUTPATIENT
Start: 2019-10-03 | End: 2019-12-10 | Stop reason: CLARIF

## 2019-10-03 NOTE — LETTER
October 3, 2019     Patient: Asif Manzanares   YOB: 1975   Date of Visit: 10/3/2019       To Whom it May Concern:    Emilia Sow is under my professional care  She was seen in my office on 10/3/2019  She may return to work on 10/07/2019  If you have any questions or concerns, please don't hesitate to call           Sincerely,          Audrey Spicer DO        CC: Jean-Pierre Lozano

## 2019-10-03 NOTE — PROGRESS NOTES
Assessment/Plan:  She has acute bronchitis at the present time which is already been treated by her allergist   She is on Augmentin and prednisone for 24 hr  She wants something for the cough  Will give her Tessalon Perles  Also because of the bronchospasm an adventitious sounds will repeat the chest x-ray and I will recheck her next week  1  Type 2 diabetes mellitus without complication, without long-term current use of insulin ([address] )  [facility] Diabetic eye exam   2  Acute bronchitis, unspecified organism  benzonatate (TESSALON PERLES) 100 mg capsule    XR chest pa & lateral   3  Screening for breast cancer  Mammo screening bilateral w 3d & cad       Orders Placed This Encounter   Procedures    [facility] Diabetic eye exam    Mammo screening bilateral w 3d & cad    XR chest pa & lateral         Subjective:  She has had a bad month  She was seen  for an allergic reaction  The etiology was unclear but might of been a spray that her child was using  On  she developed acute bronchitis  Chest x-ray showed no pneumonia but she had elevated WBCs  She was placed on doxycycline  This week she Priti Veliz her allergist who put her on Augmentin and prednisone because of bronchospasm  She still coughing and wheezing and is frustrated about her malaise  Patient ID: Vanessa Wilson is a 38 y o  female  HPI    The following portions of the patient's history were reviewed and updated as appropriate:   She has a past medical history of Arthritis, Hiatal hernia, HPV (human papilloma virus) infection, Irritable bowel syndrome, and Prediabetes  ,  does not have any pertinent problems on file  ,   has a past surgical history that includes  section; Colonoscopy; pr esophagogastroduodenoscopy transoral diagnostic (N/A, 2017); Tonsillectomy; Tubal ligation; pr lap,cholecystectomy/graph (N/A, 2018); Adenoidectomy; and Cervical biopsy  ,  family history includes Arthritis in her mother; COPD in her paternal grandmother; Diabetes in her father; Diabetes type II in her father; Endometriosis in her mother; Esophageal cancer in her maternal grandfather; Fibromyalgia in her mother; Heart disease in her paternal grandmother; Hypertension in her father; Hyperthyroidism in her paternal grandmother; Other in her father and mother; Pancreatic cancer in her maternal uncle ,   reports that she has been smoking  She has never used smokeless tobacco  She reports that she drinks about 1 0 standard drinks of alcohol per week  She reports that she does not use drugs  ,  is allergic to nuts; other; desogestrel-ethinyl estradiol; pineapple; dog epithelium allergy skin test; dust mite extract; and ortho tri-cyclen (28) [norgestimate-eth estradiol]       Current Outpatient Medications:     acetaminophen (TYLENOL) 325 mg tablet, Take 325 mg by mouth every 6 (six) hours as needed for mild pain  , Disp: , Rfl:     albuterol (PROVENTIL HFA,VENTOLIN HFA) 90 mcg/act inhaler, Inhale 2 puffs every 6 (six) hours as needed for wheezing, Disp: , Rfl:     amoxicillin (AMOXIL) 875 mg tablet, TAKE 1 TABLET BY MOUTH EVERY 12 HOURS FOR 10 DAYS, Disp: , Rfl: 0    Cetirizine HCl (ZYRTEC ALLERGY) 10 MG CAPS, Take by mouth daily  , Disp: , Rfl:     dicyclomine (BENTYL) 10 mg capsule, TAKE 1 CAPSULE BY MOUTH 3 TIMES DAILY  , Disp: 90 capsule, Rfl: 8    DULoxetine (CYMBALTA) 60 mg delayed release capsule, TAKE 1 CAPSULE BY MOUTH EVERY DAY, Disp: 30 capsule, Rfl: 0    fluticasone (FLONASE) 50 mcg/act nasal spray, 1 spray into each nostril daily, Disp: , Rfl:     montelukast (SINGULAIR) 10 mg tablet, Take 10 mg by mouth daily at bedtime, Disp: , Rfl:     pantoprazole (PROTONIX) 40 mg tablet, Take 1 tablet (40 mg total) by mouth daily, Disp: 90 tablet, Rfl: 3    predniSONE 20 mg tablet, Take 20 mg by mouth 2 (two) times a day with meals, Disp: , Rfl:     benzonatate (TESSALON PERLES) 100 mg capsule, Take 1 capsule (100 mg total) by mouth 3 (three) times a day as needed for cough, Disp: 30 capsule, Rfl: 0    olopatadine HCl (PATADAY) 0 2 % opth drops, Apply 0 2 % to eye as needed  , Disp: , Rfl:     Review of Systems   Constitutional: Negative for activity change, appetite change, chills, diaphoresis, fatigue, fever and unexpected weight change  HENT: Negative for congestion, ear pain, hearing loss, mouth sores, nosebleeds, postnasal drip, sinus pressure, sinus pain, sore throat and trouble swallowing  Eyes: Negative for pain, discharge and visual disturbance  Respiratory: Positive for cough, chest tightness and wheezing  Negative for apnea and shortness of breath  Cardiovascular: Negative for chest pain, palpitations and leg swelling  Gastrointestinal: Negative for abdominal pain, anal bleeding, blood in stool, constipation, diarrhea, nausea and vomiting  Endocrine: Negative for polydipsia and polyphagia  Genitourinary: Negative for decreased urine volume, dysuria, flank pain, frequency, hematuria and urgency  Musculoskeletal: Negative for arthralgias, back pain, gait problem, joint swelling and myalgias  Skin: Negative for rash and wound  Allergic/Immunologic: Negative for environmental allergies and food allergies  Neurological: Negative for dizziness, tremors, seizures, syncope, speech difficulty, light-headedness, numbness and headaches  Hematological: Negative for adenopathy  Does not bruise/bleed easily  Psychiatric/Behavioral: Negative for agitation, confusion, hallucinations, sleep disturbance and suicidal ideas  The patient is not nervous/anxious  Objective:  /80 (BP Location: Left arm, Patient Position: Sitting, Cuff Size: Standard)   Pulse 85   Ht 5' 5" (1 651 m)   Wt 119 kg (262 lb)   BMI 43 60 kg/m²      Physical Exam   Constitutional: She appears well-developed  No distress  Overweight woman in no acute distress  She is coughing  Temperature is 98 2°  HENT:   Head: Normocephalic  Right Ear: External ear normal    Left Ear: External ear normal    Nose: Nose normal    Mouth/Throat: Oropharynx is clear and moist  No oropharyngeal exudate  Eyes: Pupils are equal, round, and reactive to light  Conjunctivae and EOM are normal  Right eye exhibits no discharge  Left eye exhibits no discharge  Neck: Normal range of motion  Neck supple  No thyromegaly present  Cardiovascular: Normal rate, regular rhythm, normal heart sounds and intact distal pulses  Exam reveals no gallop and no friction rub  No murmur heard  Pulmonary/Chest: Effort normal  No respiratory distress  She has wheezes  She has no rales  Not really short of breath but does cough quite a bit   Abdominal: Soft  Bowel sounds are normal  She exhibits no distension and no mass  There is no tenderness  There is no rebound and no guarding  Abdomen is overweight soft nondistended  Musculoskeletal: Normal range of motion  She exhibits no edema, tenderness or deformity  Lymphadenopathy:     She has no cervical adenopathy  Neurological: She is alert  She has normal reflexes  She displays normal reflexes  No cranial nerve deficit  Coordination normal    Skin: Skin is warm and dry  No rash noted  No erythema  Psychiatric: She has a normal mood and affect  Her behavior is normal  Judgment and thought content normal    Nursing note and vitals reviewed          Recent Results (from the past 1008 hour(s))   [facility] Diabetic eye exam    Collection Time: 10/03/19  4:59 PM   Result Value Ref Range    Severity NORMAL     Right Eye Diabetic Retinopathy None     Right Eye Macular Edema None     Right Eye Other Retinopathy None     Right Eye Image Quality Gradeable Image     Left Eye Diabetic Retinopathy None     Left Eye Macular Edema None     Left Eye Other Retinopathy None     Left Eye Image Quality Gradeable Image     Result Retinal Study Result for CARMELO BADILLO     Result      Result       ABY Brock Higgins, rolly 39 y/o, F (: 1975, MRN: [id number])    Result       presented to [facility] on 10[facility] for a retinal imaging study of the left and right eyes  Result      Result       Based on the findings of the study, the following is recommended for CARMELO BADILLO    Result       Normal Study: Re-scan the patient in 12 months or in the next calendar year  Result      Result       Interpreting Provider's Comments:  No comments provided    Result      Result Right Eye Findings:     Result Normal Result  Negative for Diabetic Retinopathy  Result      Result Left Eye Findings:     Result Normal Result  Negative for Diabetic Retinopathy  Result      Result      Result       This result was electronically signed by Rubens De La Cruz MD, NPI: [id number], Taxonomy: [id number] on 10- 09:38:24 [facility] time  Result      Result       NOTE:  Any pathology noted on this diabetic retinal evaluation should be confirmed by an appropriate ophthalmic examination

## 2019-10-04 ENCOUNTER — APPOINTMENT (OUTPATIENT)
Dept: RADIOLOGY | Facility: CLINIC | Age: 44
End: 2019-10-04
Payer: COMMERCIAL

## 2019-10-04 DIAGNOSIS — J20.9 ACUTE BRONCHITIS, UNSPECIFIED ORGANISM: ICD-10-CM

## 2019-10-04 PROCEDURE — 71046 X-RAY EXAM CHEST 2 VIEWS: CPT

## 2019-10-07 DIAGNOSIS — M79.7 FIBROMYALGIA: ICD-10-CM

## 2019-10-08 RX ORDER — DULOXETIN HYDROCHLORIDE 60 MG/1
CAPSULE, DELAYED RELEASE ORAL
Qty: 30 CAPSULE | Refills: 0 | Status: SHIPPED | OUTPATIENT
Start: 2019-10-08 | End: 2019-10-31 | Stop reason: SDUPTHER

## 2019-10-09 ENCOUNTER — TELEPHONE (OUTPATIENT)
Dept: INTERNAL MEDICINE CLINIC | Facility: CLINIC | Age: 44
End: 2019-10-09

## 2019-10-09 NOTE — TELEPHONE ENCOUNTER
----- Message from [facility] sent at 10/9/2019  3:34 PM EDT -----  Call patient    Chest x-ray did not show pneumonia

## 2019-10-31 DIAGNOSIS — M79.7 FIBROMYALGIA: ICD-10-CM

## 2019-10-31 RX ORDER — DULOXETIN HYDROCHLORIDE 60 MG/1
CAPSULE, DELAYED RELEASE ORAL
Qty: 30 CAPSULE | Refills: 0 | Status: SHIPPED | OUTPATIENT
Start: 2019-10-31 | End: 2019-11-27 | Stop reason: SDUPTHER

## 2019-11-27 DIAGNOSIS — M79.7 FIBROMYALGIA: ICD-10-CM

## 2019-11-27 RX ORDER — DULOXETIN HYDROCHLORIDE 60 MG/1
CAPSULE, DELAYED RELEASE ORAL
Qty: 30 CAPSULE | Refills: 0 | Status: SHIPPED | OUTPATIENT
Start: 2019-11-27 | End: 2019-12-30 | Stop reason: SDUPTHER

## 2019-12-09 RX ORDER — PREDNISONE 10 MG/1
TABLET ORAL
Qty: 60 TABLET | Refills: 1 | OUTPATIENT
Start: 2019-12-09

## 2019-12-09 NOTE — TELEPHONE ENCOUNTER
Patient states she has been off the prednisone for a month do you just want her to make an appointment to see you about further treatment?

## 2019-12-09 NOTE — TELEPHONE ENCOUNTER
That was for back pain that was given to her in June  She was supposed to stop it when her back pain went away last summer  If she is currently taking it every day we need to wean her off of that  Tell her to take 10 mg per day and make an appointment in 1 week  Have her get a same day appointment if needed    Put it into the computer for 14 tablets

## 2019-12-10 ENCOUNTER — OFFICE VISIT (OUTPATIENT)
Dept: INTERNAL MEDICINE CLINIC | Facility: CLINIC | Age: 44
End: 2019-12-10
Payer: COMMERCIAL

## 2019-12-10 VITALS
BODY MASS INDEX: 43.65 KG/M2 | HEART RATE: 74 BPM | TEMPERATURE: 98.9 F | SYSTOLIC BLOOD PRESSURE: 138 MMHG | HEIGHT: 65 IN | WEIGHT: 262 LBS | DIASTOLIC BLOOD PRESSURE: 98 MMHG | OXYGEN SATURATION: 98 %

## 2019-12-10 DIAGNOSIS — E78.00 PURE HYPERCHOLESTEROLEMIA: ICD-10-CM

## 2019-12-10 DIAGNOSIS — F33.1 MODERATE EPISODE OF RECURRENT MAJOR DEPRESSIVE DISORDER (HCC): ICD-10-CM

## 2019-12-10 DIAGNOSIS — E11.9 TYPE 2 DIABETES MELLITUS WITHOUT COMPLICATION, WITHOUT LONG-TERM CURRENT USE OF INSULIN (HCC): Primary | ICD-10-CM

## 2019-12-10 DIAGNOSIS — E66.01 MORBID OBESITY WITH BMI OF 40.0-44.9, ADULT (HCC): ICD-10-CM

## 2019-12-10 DIAGNOSIS — R53.82 CHRONIC FATIGUE: ICD-10-CM

## 2019-12-10 DIAGNOSIS — B02.30 HERPES ZOSTER WITH OPHTHALMIC COMPLICATION, UNSPECIFIED HERPES ZOSTER EYE DISEASE: ICD-10-CM

## 2019-12-10 PROCEDURE — 3008F BODY MASS INDEX DOCD: CPT | Performed by: PHYSICIAN ASSISTANT

## 2019-12-10 PROCEDURE — 99214 OFFICE O/P EST MOD 30 MIN: CPT | Performed by: PHYSICIAN ASSISTANT

## 2019-12-10 RX ORDER — VALACYCLOVIR HYDROCHLORIDE 1 G/1
1000 TABLET, FILM COATED ORAL 3 TIMES DAILY
Qty: 21 TABLET | Refills: 0 | Status: SHIPPED | OUTPATIENT
Start: 2019-12-10 | End: 2022-04-26 | Stop reason: ALTCHOICE

## 2019-12-10 RX ORDER — PREDNISONE 20 MG/1
20 TABLET ORAL DAILY
Qty: 5 TABLET | Refills: 0 | Status: SHIPPED | OUTPATIENT
Start: 2019-12-10 | End: 2019-12-15

## 2019-12-10 NOTE — PROGRESS NOTES
Assessment/Plan: shingles infection involving her right eye antiviral steroids she will see Ophthalmology today one-week follow-up       Diagnoses and all orders for this visit:    Type 2 diabetes mellitus without complication, without long-term current use of insulin ([facility])  -     POCT urine microalbumin/creatinine    Moderate episode of recurrent major depressive disorder ([facility] [address] )    Morbid obesity with BMI of 40 0-44 9, adult ([facility])    Chronic fatigue    Pure hypercholesterolemia    Herpes zoster with ophthalmic complication, unspecified herpes zoster eye disease  -     valACYclovir (VALTREX) 1,000 mg tablet; Take 1 tablet (1,000 mg total) by mouth 3 (three) times a day for 7 days  -     predniSONE 20 mg tablet; Take 1 tablet (20 mg total) by mouth daily for 5 days        No problem-specific Assessment & Plan notes found for this encounter  BMI Counseling: Body mass index is 43 6 kg/m²  The BMI is above normal  Nutrition recommendations include decreasing portion sizes  Exercise recommendations include exercising 3-5 times per week  Subjective:      Patient ID: Shanon Etienne is a 38 y o  female  She noticed some irritation of her right eye yesterday than some pain over her right eye this morning she had swelling upper lid and a painful lump above her right eyebrow  Vision a bit blurry no pain in the eyeball  Her health was previously good no fever chills diabetic well controlled with meds depression asthma well controlled with meds obesity considering weight management measures      The following portions of the patient's history were reviewed and updated as appropriate:   She has a past medical history of Arthritis, Hiatal hernia, HPV (human papilloma virus) infection, Irritable bowel syndrome, and Prediabetes  ,  does not have any pertinent problems on file  ,   has a past surgical history that includes  section; Colonoscopy; pr esophagogastroduodenoscopy transoral diagnostic (N/A, 11/30/2017); Tonsillectomy; Tubal ligation; pr lap,cholecystectomy/graph (N/A, 2/9/2018); Adenoidectomy; and Cervical biopsy  ,  family history includes Arthritis in her mother; COPD in her paternal grandmother; Diabetes in her father; Diabetes type II in her father; Endometriosis in her mother; Esophageal cancer in her maternal grandfather; Fibromyalgia in her mother; Heart disease in her paternal grandmother; Hypertension in her father; Hyperthyroidism in her paternal grandmother; Other in her father and mother; Pancreatic cancer in her maternal uncle ,   reports that she has been smoking  She has never used smokeless tobacco  She reports that she drinks about 1 0 standard drinks of alcohol per week  She reports that she does not use drugs  ,  is allergic to nuts; other; desogestrel-ethinyl estradiol; pineapple; dog epithelium allergy skin test; dust mite extract; and ortho tri-cyclen (28) [norgestimate-eth estradiol]     Current Outpatient Medications   Medication Sig Dispense Refill    acetaminophen (TYLENOL) 325 mg tablet Take 325 mg by mouth every 6 (six) hours as needed for mild pain        Cetirizine HCl (ZYRTEC ALLERGY) 10 MG CAPS Take by mouth daily        dicyclomine (BENTYL) 10 mg capsule TAKE 1 CAPSULE BY MOUTH 3 TIMES DAILY   90 capsule 8    DULoxetine (CYMBALTA) 60 mg delayed release capsule TAKE 1 CAPSULE BY MOUTH EVERY DAY 30 capsule 0    montelukast (SINGULAIR) 10 mg tablet Take 10 mg by mouth daily at bedtime      pantoprazole (PROTONIX) 40 mg tablet Take 1 tablet (40 mg total) by mouth daily 90 tablet 3    albuterol (PROVENTIL HFA,VENTOLIN HFA) 90 mcg/act inhaler Inhale 2 puffs every 6 (six) hours as needed for wheezing      fluticasone (FLONASE) 50 mcg/act nasal spray 1 spray into each nostril daily      olopatadine HCl (PATADAY) 0 2 % opth drops Apply 0 2 % to eye as needed        predniSONE 20 mg tablet Take 1 tablet (20 mg total) by mouth daily for 5 days 5 tablet 0    valACYclovir (VALTREX) 1,000 mg tablet Take 1 tablet (1,000 mg total) by mouth 3 (three) times a day for 7 days 21 tablet 0     No current facility-administered medications for this visit  Review of Systems   Constitutional: Negative for activity change, appetite change, chills, diaphoresis, fatigue, fever and unexpected weight change  HENT: Negative for congestion, dental problem, drooling, ear discharge, ear pain, facial swelling, hearing loss, nosebleeds, postnasal drip, rhinorrhea, sinus pressure, sinus pain, sneezing, sore throat, tinnitus, trouble swallowing and voice change  Eyes: Negative for photophobia, pain, discharge, redness, itching and visual disturbance  Respiratory: Negative for apnea, cough, choking, chest tightness, shortness of breath, wheezing and stridor  Cardiovascular: Negative for chest pain, palpitations and leg swelling  Gastrointestinal: Negative for abdominal distention, abdominal pain, anal bleeding, blood in stool, constipation, diarrhea, nausea, rectal pain and vomiting  Endocrine: Negative for cold intolerance, heat intolerance, polydipsia, polyphagia and polyuria  Genitourinary: Negative for decreased urine volume, difficulty urinating, dysuria, enuresis, flank pain, frequency, genital sores, hematuria and urgency  Musculoskeletal: Negative for arthralgias, back pain, gait problem, joint swelling, myalgias, neck pain and neck stiffness  Skin: Positive for rash  Negative for color change, pallor and wound  Neurological: Negative for dizziness, tremors, seizures, syncope, facial asymmetry, speech difficulty, weakness, light-headedness, numbness and headaches  Hematological: Negative for adenopathy  Does not bruise/bleed easily  Psychiatric/Behavioral: Negative for agitation, behavioral problems, confusion, decreased concentration, dysphoric mood, hallucinations, self-injury, sleep disturbance and suicidal ideas   The patient is not nervous/anxious and is not hyperactive  Objective:Right Foot/Ankle   Right Foot Inspection  Skin Exam: skin normal and skin intact no dry skin, no warmth, no callus, no erythema, no maceration, no abnormal color, no pre-ulcer, no ulcer and no callus                          Toe Exam: ROM and strength within normal limits  Sensory   Vibration: intact    Monofilament testing: intact  Vascular    The right DP pulse is 2+  The right PT pulse is 2+  Left Foot/Ankle  Left Foot Inspection  Skin Exam: skin normal and skin intactno dry skin, no warmth, no erythema, no maceration, normal color, no pre-ulcer, no ulcer and no callus                         Toe Exam: ROM and strength within normal limits                   Sensory   Vibration: intact    Monofilament: intact  Vascular    The left DP pulse is 2+  The left PT pulse is 2+  Assign Risk Category:  No deformity present; No loss of protective sensation; No weak pulses       Risk: 0    Vitals:    12/10/19 1128   BP: 138/98   BP Location: Left arm   Patient Position: Sitting   Cuff Size: Standard   Pulse: 74   Temp: 98 9 °F (37 2 °C)   SpO2: 98%   Weight: 119 kg (262 lb)   Height: 5' 5" (1 651 m)     Body mass index is 43 6 kg/m²  Physical Exam   Constitutional: She is oriented to person, place, and time  She appears well-developed  obese   HENT:   Head: Normocephalic  Right Ear: External ear normal    Left Ear: External ear normal    Nose: Nose normal    Mouth/Throat: Oropharynx is clear and moist  No oropharyngeal exudate  Eyes: Pupils are equal, round, and reactive to light  Conjunctivae and EOM are normal  Right eye exhibits no discharge  Left eye exhibits no discharge  No scleral icterus  Neck: Neck supple  No JVD present  No thyromegaly present  Cardiovascular: Normal rate, regular rhythm, normal heart sounds and intact distal pulses  Pulses are no weak pulses  No murmur heard    Pulses:       Dorsalis pedis pulses are 2+ on the right side, and 2+ on the left side  Posterior tibial pulses are 2+ on the right side, and 2+ on the left side  Pulmonary/Chest: Effort normal and breath sounds normal    Abdominal: Soft  Bowel sounds are normal    Musculoskeletal: Normal range of motion  Feet:   Right Foot:   Skin Integrity: Negative for ulcer, skin breakdown, erythema, warmth, callus or dry skin  Left Foot:   Skin Integrity: Negative for ulcer, skin breakdown, erythema, warmth, callus or dry skin  Lymphadenopathy:     She has no cervical adenopathy  Neurological: She is alert and oriented to person, place, and time  She has normal reflexes  Skin: Skin is warm and dry  Rash ( small cluster of vesicles slight redness swelling above her right eyebrow) noted  Vitals reviewed

## 2019-12-13 ENCOUNTER — HOSPITAL ENCOUNTER (EMERGENCY)
Facility: HOSPITAL | Age: 44
Discharge: HOME | End: 2019-12-13
Attending: EMERGENCY MEDICINE | Admitting: EMERGENCY MEDICINE
Payer: COMMERCIAL

## 2019-12-13 VITALS
TEMPERATURE: 98.7 F | SYSTOLIC BLOOD PRESSURE: 187 MMHG | HEART RATE: 100 BPM | RESPIRATION RATE: 18 BRPM | DIASTOLIC BLOOD PRESSURE: 114 MMHG | OXYGEN SATURATION: 98 %

## 2019-12-13 DIAGNOSIS — L02.01 FACIAL ABSCESS: Primary | ICD-10-CM

## 2019-12-13 PROCEDURE — 99283 EMERGENCY DEPT VISIT LOW MDM: CPT

## 2019-12-13 PROCEDURE — 99283 EMERGENCY DEPT VISIT LOW MDM: CPT | Performed by: EMERGENCY MEDICINE

## 2019-12-13 PROCEDURE — 10060 I&D ABSCESS SIMPLE/SINGLE: CPT | Performed by: EMERGENCY MEDICINE

## 2019-12-13 RX ORDER — LIDOCAINE HYDROCHLORIDE AND EPINEPHRINE 10; 10 MG/ML; UG/ML
20 INJECTION, SOLUTION INFILTRATION; PERINEURAL ONCE
Status: COMPLETED | OUTPATIENT
Start: 2019-12-13 | End: 2019-12-13

## 2019-12-13 RX ADMIN — LIDOCAINE HYDROCHLORIDE,EPINEPHRINE BITARTRATE 20 ML: 10; .01 INJECTION, SOLUTION INFILTRATION; PERINEURAL at 08:34

## 2019-12-13 NOTE — ED PROVIDER NOTES
History  Chief Complaint   Patient presents with    Facial Swelling     pt started with red bump above right eye and swelling of eye on monday, pt states left eye is now swollen and eye specialist dx pt with cellulitis  43-year-old female presents with chief complaint of facial swelling  Patient reports 5 days ago she started with a small red irritation above her right eyebrow which then developed into right upper eyelid swelling  Patient saw the physician extender at her family practitioners office and was diagnosed with possible shingles with ophthalmic involvement  Patient was started on medication referred to ophthalmologist for further evaluation  Ophthalmologist saw the patient yesterday and diagnosed her with cellulitis and started her on Bactrim  Patient reports this morning she woke up and now has left eye swelling and worsening pain above her right eye  Exam is consistent with an abscess with surrounding inflammation  History provided by:  Patient and medical records   used: No    Abscess   Location:  Face  Facial abscess location:  R eyebrow  Size:  3cm  Abscess quality: fluctuance, painful and redness    Red streaking: no    Duration:  5 days  Progression:  Worsening  Pain details:     Quality:  Pressure and throbbing    Severity:  Moderate    Duration:  5 days    Timing:  Constant    Progression:  Unchanged  Chronicity:  New  Context: diabetes    Relieved by:  Nothing  Worsened by:  Nothing  Ineffective treatments:  Oral antibiotics  Associated symptoms: headaches    Associated symptoms: no fever, no nausea and no vomiting        Prior to Admission Medications   Prescriptions Last Dose Informant Patient Reported? Taking?    Cetirizine HCl (ZYRTEC ALLERGY) 10 MG CAPS  Self Yes No   Sig: Take by mouth daily     DULoxetine (CYMBALTA) 60 mg delayed release capsule   No No   Sig: TAKE 1 CAPSULE BY MOUTH EVERY DAY   acetaminophen (TYLENOL) 325 mg tablet  Self Yes No   Sig: Take 325 mg by mouth every 6 (six) hours as needed for mild pain     albuterol (PROVENTIL HFA,VENTOLIN HFA) 90 mcg/act inhaler  Self Yes No   Sig: Inhale 2 puffs every 6 (six) hours as needed for wheezing   dicyclomine (BENTYL) 10 mg capsule  Self No No   Sig: TAKE 1 CAPSULE BY MOUTH 3 TIMES DAILY  fluticasone (FLONASE) 50 mcg/act nasal spray  Self Yes No   Si spray into each nostril daily   montelukast (SINGULAIR) 10 mg tablet  Self Yes No   Sig: Take 10 mg by mouth daily at bedtime   olopatadine HCl (PATADAY) 0 2 % opth drops  Self Yes No   Sig: Apply 0 2 % to eye as needed     pantoprazole (PROTONIX) 40 mg tablet  Self No No   Sig: Take 1 tablet (40 mg total) by mouth daily   predniSONE 20 mg tablet   No No   Sig: Take 1 tablet (20 mg total) by mouth daily for 5 days   valACYclovir (VALTREX) 1,000 mg tablet   No No   Sig: Take 1 tablet (1,000 mg total) by mouth 3 (three) times a day for 7 days      Facility-Administered Medications: None       Past Medical History:   Diagnosis Date    Arthritis     Hiatal hernia     HPV (human papilloma virus) infection     Irritable bowel syndrome     Prediabetes        Past Surgical History:   Procedure Laterality Date    ADENOIDECTOMY      CERVICAL BIOPSY       SECTION      COLONOSCOPY      ID ESOPHAGOGASTRODUODENOSCOPY TRANSORAL DIAGNOSTIC N/A 2017    Procedure: EGD AND COLONOSCOPY;  Surgeon: Dutch Larson MD;  Location: [facility];   Service: Gastroenterology    ID LAP,CHOLECYSTECTOMY/GRAPH N/A 2018    Procedure: LAPAROSCOPIC CHOLECYSTECTOMY WITH IOC;  Surgeon: Isabel Rhodes MD;  Location: [facility];  Service: General    TONSILLECTOMY      TUBAL LIGATION         Family History   Problem Relation Age of Onset    Arthritis Mother     Fibromyalgia Mother     Endometriosis Mother     Other Mother         Eye pressure, gallbladder removal    Hypertension Father     Diabetes Father     Diabetes type II Father     Other Father Gallbladder removal     Pancreatic cancer Maternal Uncle     Esophageal cancer Maternal Grandfather     Heart disease Paternal Grandmother     Hyperthyroidism Paternal Grandmother     COPD Paternal Grandmother     Stroke Neg Hx     Thyroid cancer Neg Hx      I have reviewed and agree with the history as documented  Social History     Tobacco Use    Smoking status: Current Every Day Smoker    Smokeless tobacco: Never Used   Substance Use Topics    Alcohol use: Yes     Alcohol/week: 1 0 standard drinks     Types: 1 Glasses of wine per week     Comment: glass of wine every other week    Drug use: No        Review of Systems   Constitutional: Negative for chills, diaphoresis and fever  HENT: Positive for facial swelling  Respiratory: Negative for shortness of breath  Cardiovascular: Negative for chest pain and palpitations  Gastrointestinal: Negative for diarrhea, nausea and vomiting  Genitourinary: Negative for dysuria and frequency  Skin: Negative for rash  Neurological: Positive for headaches  All other systems reviewed and are negative  Physical Exam  Physical Exam   Constitutional: She is oriented to person, place, and time  She appears well-developed and well-nourished  She appears distressed  HENT:   Head: Normocephalic and atraumatic  Broad based, shallow abscess above right eyebrow with reactive edema to upper face (bilateral upper eyelids)  Eyes: Pupils are equal, round, and reactive to light  EOM are normal    Neck: Normal range of motion  No JVD present  Cardiovascular: Normal rate, regular rhythm, normal heart sounds and intact distal pulses  Exam reveals no gallop and no friction rub  No murmur heard  Pulmonary/Chest: Effort normal and breath sounds normal  No respiratory distress  She has no wheezes  She has no rales  She exhibits no tenderness  Musculoskeletal: Normal range of motion  She exhibits no tenderness     Neurological: She is alert and oriented to person, place, and time  Skin: Skin is warm and dry  There is erythema (face)  Psychiatric: She has a normal mood and affect  Her behavior is normal  Judgment and thought content normal    Nursing note and vitals reviewed  Vital Signs  ED Triage Vitals   Temperature Pulse Respirations Blood Pressure SpO2   12/13/19 0759 12/13/19 0757 12/13/19 0757 12/13/19 0757 12/13/19 0757   98 7 °F (37 1 °C) 100 18 (!) 187/114 98 %      Temp Source Heart Rate Source Patient Position - Orthostatic VS BP Location FiO2 (%)   12/13/19 0759 12/13/19 0757 12/13/19 0757 12/13/19 0757 --   Oral Monitor Sitting Right arm       Pain Score       --                  Vitals:    12/13/19 0757   BP: (!) 187/114   Pulse: 100   Patient Position - Orthostatic VS: Sitting         Visual Acuity      ED Medications  Medications   lidocaine-epinephrine (XYLOCAINE/EPINEPHRINE) 1 %-1:100,000 injection 20 mL (20 mL Infiltration Given 12/13/19 7213)       Diagnostic Studies  Results Reviewed     None                 No orders to display              Procedures  Incision and drain  Date/Time: 12/13/2019 8:35 AM  Performed by: Caro Powers MD  Authorized by: Caro Powers MD     Patient location:  ED  Other Assisting Provider: No    Consent:     Consent obtained:  Verbal    Consent given by:  Patient    Risks discussed:  Bleeding, pain and incomplete drainage  Universal protocol:     Procedure explained and questions answered to patient or proxy's satisfaction: yes      Patient identity confirmed:  Verbally with patient  Location:     Type:  Abscess    Size:  3 cm    Location:  Head/neck    Head/neck location:  Face (above right eyebrow)  Pre-procedure details:     Skin preparation:  Antiseptic wash  Anesthesia (see MAR for exact dosages):      Anesthesia method:  Local infiltration    Local anesthetic:  Lidocaine 1% WITH epi  Procedure details:     Complexity:  Simple    Needle aspiration: no      Incision types:  Stab incision Scalpel blade:  11    Approach:  Open    Incision depth:  Subcutaneous    Wound management:  Probed and deloculated    Drainage:  Purulent    Drainage amount: scant to moderate  Wound treatment:  Wound left open    Packing materials:  None  Post-procedure details:     Patient tolerance of procedure: Tolerated well, no immediate complications             ED Course                               MDM  Number of Diagnoses or Management Options  Facial abscess: new and does not require workup  Diagnosis management comments: 38 y o  female patient presents with an abscess located above her right eyebrow  This will require incision and drainage  Will give return instructions for fevers, chills, re-accumulation of fluid, skin changes or anything that causes concern for the patient  Risk of Complications, Morbidity, and/or Mortality  Management options: high    Patient Progress  Patient progress: stable        Disposition  Final diagnoses:   Facial abscess     Time reflects when diagnosis was documented in both MDM as applicable and the Disposition within this note     Time User Action Codes Description Comment    12/13/2019  8:50 AM Sarai Mejia [L02 [id number]] Facial abscess       ED Disposition     ED Disposition Condition Date/Time Comment    Discharge Stable Fri Dec 13, 2019  8:50 AM Vance Tate discharge to home/self care  Follow-up Information     Follow up With Specialties Details Why Contact Yunier Timmons DO Internal Medicine Schedule an appointment as soon as possible for a visit in 1 week  [address]  [phone]            Patient's Medications   Discharge Prescriptions    No medications on file     No discharge procedures on file      ED Provider  Electronically Signed by           Lana Singer MD  12/13/19 6642

## 2019-12-14 ENCOUNTER — HOSPITAL ENCOUNTER (EMERGENCY)
Facility: HOSPITAL | Age: 44
Discharge: HOME | End: 2019-12-14
Attending: EMERGENCY MEDICINE | Admitting: EMERGENCY MEDICINE
Payer: COMMERCIAL

## 2019-12-14 VITALS
RESPIRATION RATE: 16 BRPM | SYSTOLIC BLOOD PRESSURE: 177 MMHG | WEIGHT: 254 LBS | DIASTOLIC BLOOD PRESSURE: 95 MMHG | BODY MASS INDEX: 42.27 KG/M2 | HEART RATE: 111 BPM | TEMPERATURE: 98.5 F | OXYGEN SATURATION: 97 %

## 2019-12-14 DIAGNOSIS — L02.01 ABSCESS OF FOREHEAD: Primary | ICD-10-CM

## 2019-12-14 PROCEDURE — 99284 EMERGENCY DEPT VISIT MOD MDM: CPT | Performed by: EMERGENCY MEDICINE

## 2019-12-14 RX ORDER — IBUPROFEN 400 MG/1
800 TABLET ORAL ONCE
Status: COMPLETED | OUTPATIENT
Start: 2019-12-14 | End: 2019-12-14

## 2019-12-14 RX ORDER — CLINDAMYCIN HYDROCHLORIDE 150 MG/1
600 CAPSULE ORAL ONCE
Status: COMPLETED | OUTPATIENT
Start: 2019-12-14 | End: 2019-12-14

## 2019-12-14 RX ORDER — CLINDAMYCIN HYDROCHLORIDE 150 MG/1
300 CAPSULE ORAL 4 TIMES DAILY
Qty: 80 CAPSULE | Refills: 0 | Status: SHIPPED | OUTPATIENT
Start: 2019-12-14 | End: 2019-12-24

## 2019-12-14 RX ADMIN — IBUPROFEN 800 MG: 400 TABLET ORAL at 18:59

## 2019-12-14 RX ADMIN — CLINDAMYCIN HYDROCHLORIDE 600 MG: 150 CAPSULE ORAL at 18:45

## 2019-12-14 NOTE — ED PROVIDER NOTES
History  Chief Complaint   Patient presents with    Follow-Up Abscess     Pt presents to the ED with right forehead abccaitlin mak  Pt reports was lanced yesterday in the ED  Patient is a 37 yo female presenting with worsening induration of abscess on forehead since yesterday  Patient seen yesterday in ED at which point abscess was drained  Patient started taking Bactrim on Thursday per Opthamology, patient seen on 19 and diagnosed with cellulitis  Patient states she has been compliant with antibiotics  Patient also states she feels her abscess has indurated further since she had it drained yesterday  Patient also notes some increased pre-auricular discomfort on the right side  Patient endorses chills overnight, however denies fever or visual disturbances at this time  Prior to Admission Medications   Prescriptions Last Dose Informant Patient Reported? Taking? Cetirizine HCl (ZYRTEC ALLERGY) 10 MG CAPS  Self Yes No   Sig: Take by mouth daily     DULoxetine (CYMBALTA) 60 mg delayed release capsule   No No   Sig: TAKE 1 CAPSULE BY MOUTH EVERY DAY   acetaminophen (TYLENOL) 325 mg tablet  Self Yes No   Sig: Take 325 mg by mouth every 6 (six) hours as needed for mild pain     albuterol (PROVENTIL HFA,VENTOLIN HFA) 90 mcg/act inhaler  Self Yes No   Sig: Inhale 2 puffs every 6 (six) hours as needed for wheezing   dicyclomine (BENTYL) 10 mg capsule  Self No No   Sig: TAKE 1 CAPSULE BY MOUTH 3 TIMES DAILY     fluticasone (FLONASE) 50 mcg/act nasal spray  Self Yes No   Si spray into each nostril daily   montelukast (SINGULAIR) 10 mg tablet  Self Yes No   Sig: Take 10 mg by mouth daily at bedtime   olopatadine HCl (PATADAY) 0 2 % opth drops  Self Yes No   Sig: Apply 0 2 % to eye as needed     pantoprazole (PROTONIX) 40 mg tablet  Self No No   Sig: Take 1 tablet (40 mg total) by mouth daily   predniSONE 20 mg tablet   No No   Sig: Take 1 tablet (20 mg total) by mouth daily for 5 days   valACYclovir (VALTREX) 1,000 mg tablet   No No   Sig: Take 1 tablet (1,000 mg total) by mouth 3 (three) times a day for 7 days      Facility-Administered Medications: None       Past Medical History:   Diagnosis Date    Arthritis     Hiatal hernia     HPV (human papilloma virus) infection     Irritable bowel syndrome     Prediabetes        Past Surgical History:   Procedure Laterality Date    ADENOIDECTOMY      CERVICAL BIOPSY       SECTION      COLONOSCOPY      UT ESOPHAGOGASTRODUODENOSCOPY TRANSORAL DIAGNOSTIC N/A 2017    Procedure: EGD AND COLONOSCOPY;  Surgeon: Dutch Larson MD;  Location: [facility]; Service: Gastroenterology    UT LAP,CHOLECYSTECTOMY/GRAPH N/A 2018    Procedure: LAPAROSCOPIC CHOLECYSTECTOMY WITH IOC;  Surgeon: Isabel Rhodes MD;  Location: [facility];  Service: General    TONSILLECTOMY      TUBAL LIGATION         Family History   Problem Relation Age of Onset    Arthritis Mother     Fibromyalgia Mother     Endometriosis Mother     Other Mother         Eye pressure, gallbladder removal    Hypertension Father     Diabetes Father     Diabetes type II Father     Other Father         Gallbladder removal     Pancreatic cancer Maternal Uncle     Esophageal cancer Maternal Grandfather     Heart disease Paternal Grandmother     Hyperthyroidism Paternal Grandmother     COPD Paternal Grandmother     Stroke Neg Hx     Thyroid cancer Neg Hx      I have reviewed and agree with the history as documented  Social History     Tobacco Use    Smoking status: Current Every Day Smoker     Types: Cigarettes    Smokeless tobacco: Never Used   Substance Use Topics    Alcohol use: Yes     Alcohol/week: 1 0 standard drinks     Types: 1 Glasses of wine per week     Comment: glass of wine every other week    Drug use: No        Review of Systems   Constitutional: Positive for chills  Negative for appetite change, fatigue and fever  HENT: Positive for facial swelling  Eyes: Negative for pain and visual disturbance  Respiratory: Negative for chest tightness and shortness of breath  Cardiovascular: Negative for chest pain and palpitations  Gastrointestinal: Negative for nausea and vomiting  Musculoskeletal: Negative for neck pain and neck stiffness  Skin:        Abscess above right eyebrow   Neurological: Negative for dizziness, syncope and light-headedness  All other systems reviewed and are negative  Physical Exam  ED Triage Vitals [12/14/19 1718]   Temperature Pulse Respirations Blood Pressure SpO2   98 5 °F (36 9 °C) (!) 111 16 (!) 177/95 97 %      Temp Source Heart Rate Source Patient Position - Orthostatic VS BP Location FiO2 (%)   Oral Monitor Sitting Right arm --      Pain Score       8             Orthostatic Vital Signs  Vitals:    12/14/19 1718   BP: (!) 177/95   Pulse: (!) 111   Patient Position - Orthostatic VS: Sitting       Physical Exam   Constitutional: She is oriented to person, place, and time  She appears well-developed  HENT:   Head: Normocephalic  Right Ear: Hearing normal  There is tenderness  No drainage  Left Ear: Hearing normal    Ears:    Eyes: Conjunctivae are normal    Neck: Neck supple  Cardiovascular: Normal rate and regular rhythm  Pulmonary/Chest: Effort normal and breath sounds normal    Abdominal: Soft  Bowel sounds are normal    Neurological: She is alert and oriented to person, place, and time  Skin: Skin is warm and dry  Psychiatric: She has a normal mood and affect  Nursing note and vitals reviewed        ED Medications  Medications   clindamycin (CLEOCIN) capsule 600 mg (600 mg Oral Given 12/14/19 1845)   ibuprofen (MOTRIN) tablet 800 mg (800 mg Oral Given 12/14/19 1859)       Diagnostic Studies  Results Reviewed     None                 No orders to display         Procedures  Procedures      ED Course                               MDM  Number of Diagnoses or Management Options  Abscess of forehead: established and worsening     Amount and/or Complexity of Data Reviewed  Tests in the medicine section of CPT®: ordered and reviewed  Review and summarize past medical records: yes  Independent visualization of images, tracings, or specimens: yes    Risk of Complications, Morbidity, and/or Mortality  Presenting problems: minimal  Diagnostic procedures: minimal  Management options: minimal    Patient Progress  Patient progress: stable        Disposition  Final diagnoses:   Abscess of forehead     Time reflects when diagnosis was documented in both MDM as applicable and the Disposition within this note     Time User Action Codes Description Comment    12/14/2019  6:31 PM Nelson Vivas Add [L02 01] Abscess of forehead       ED Disposition     ED Disposition Condition Date/Time Comment    Discharge Stable Sat Dec 14, 2019  6:58 PM Vance Tate discharge to home/self care  Follow-up Information    None         Patient's Medications   Discharge Prescriptions    CLINDAMYCIN (CLEOCIN) 150 MG CAPSULE    Take 2 capsules (300 mg total) by mouth 4 (four) times a day for 10 days       Start Date: 12/14/2019End Date: 12/24/2019       Order Dose: 300 mg       Quantity: 80 capsule    Refills: 0     No discharge procedures on file  ED Provider  Attending physically available and evaluated Vance Tate  I managed the patient along with the ED Attending      Electronically Signed by         Kevyn Rose MD  12/14/19 8260

## 2019-12-17 NOTE — ED ATTENDING ATTESTATION
12/14/2019  IMarco Antonio MD, saw and evaluated the patient  I have discussed the patient with the resident/non-physician practitioner and agree with the resident's/non-physician practitioner's findings, Plan of Care, and MDM as documented in the resident's/non-physician practitioner's note, except where noted  All available labs and Radiology studies were reviewed  I was present for key portions of any procedure(s) performed by the resident/non-physician practitioner and I was immediately available to provide assistance  At this point I agree with the current assessment done in the Emergency Department  I have conducted an independent evaluation of this patient a history and physical is as follows:    ED Course    patient seen independently by myself and in conjunction with the resident  At this time there is no fluctuance or area that needs repeat incision and drainage  Will change antibiotics to clindamycin  Discussed signs and symptoms that would require return to the emergency department or follow up with private physician        Critical Care Time  Procedures

## 2019-12-26 ENCOUNTER — OFFICE VISIT (OUTPATIENT)
Dept: INTERNAL MEDICINE CLINIC | Facility: CLINIC | Age: 44
End: 2019-12-26
Payer: COMMERCIAL

## 2019-12-26 VITALS
WEIGHT: 260.6 LBS | HEIGHT: 65 IN | HEART RATE: 94 BPM | DIASTOLIC BLOOD PRESSURE: 80 MMHG | SYSTOLIC BLOOD PRESSURE: 138 MMHG | BODY MASS INDEX: 43.42 KG/M2 | OXYGEN SATURATION: 95 %

## 2019-12-26 DIAGNOSIS — L03.213 PERIORBITAL CELLULITIS OF RIGHT EYE: Primary | ICD-10-CM

## 2019-12-26 PROCEDURE — 99214 OFFICE O/P EST MOD 30 MIN: CPT | Performed by: INTERNAL MEDICINE

## 2019-12-26 RX ORDER — CLINDAMYCIN HYDROCHLORIDE 300 MG/1
300 CAPSULE ORAL 4 TIMES DAILY
Qty: 40 CAPSULE | Refills: 1 | Status: SHIPPED | OUTPATIENT
Start: 2019-12-26 | End: 2019-12-31

## 2019-12-26 RX ORDER — CLINDAMYCIN HYDROCHLORIDE 300 MG/1
300 CAPSULE ORAL 4 TIMES DAILY
COMMUNITY
End: 2019-12-26 | Stop reason: SDUPTHER

## 2019-12-26 NOTE — PROGRESS NOTES
Assessment/Plan:  Is coming along slowly  Will recheck it in 4 days  She will continue clindamycin over the weekend  In addition will refer her to Plastic surgery and wound management  Referral was given  1  Periorbital cellulitis of right eye  clindamycin (CLEOCIN) 300 MG capsule    Ambulatory referral to Plastic Surgery       Orders Placed This Encounter   Procedures    Ambulatory referral to Plastic Surgery         Subjective:  She is doing much better  Patient ID: Thiago Nelson is a 38 y o  female  HPI she had an abscess formation above the right eye  At 1st it was thought to be shingles but as a developed it did develop into an abscess  It was drained at the emergency room  When seen by Ophthalmology she had been placed on Bactrim  That was switched to clindamycin  She is currently taking 300 mg every 6 hours  She is finishing up 2 weeks of therapy  It is looking much better  The following portions of the patient's history were reviewed and updated as appropriate:   She has a past medical history of Arthritis, Hiatal hernia, HPV (human papilloma virus) infection, Irritable bowel syndrome, and Prediabetes  ,  does not have any pertinent problems on file  ,   has a past surgical history that includes  section; Colonoscopy; pr esophagogastroduodenoscopy transoral diagnostic (N/A, 2017); Tonsillectomy; Tubal ligation; pr lap,cholecystectomy/graph (N/A, 2018); Adenoidectomy; and Cervical biopsy  ,  family history includes Arthritis in her mother; COPD in her paternal grandmother; Diabetes in her father; Diabetes type II in her father; Endometriosis in her mother; Esophageal cancer in her maternal grandfather; Fibromyalgia in her mother; Heart disease in her paternal grandmother; Hypertension in her father; Hyperthyroidism in her paternal grandmother;  Other in her father and mother; Pancreatic cancer in her maternal uncle ,   reports that she has been smoking cigarettes  She has never used smokeless tobacco  She reports that she drinks about 1 0 standard drinks of alcohol per week  She reports that she does not use drugs  ,  is allergic to nuts; other; desogestrel-ethinyl estradiol; pineapple; dog epithelium allergy skin test; dust mite extract; and ortho tri-cyclen (28) [norgestimate-eth estradiol]       Current Outpatient Medications:     acetaminophen (TYLENOL) 325 mg tablet, Take 325 mg by mouth every 6 (six) hours as needed for mild pain  , Disp: , Rfl:     albuterol (PROVENTIL HFA,VENTOLIN HFA) 90 mcg/act inhaler, Inhale 2 puffs every 6 (six) hours as needed for wheezing, Disp: , Rfl:     Cetirizine HCl (ZYRTEC ALLERGY) 10 MG CAPS, Take by mouth daily  , Disp: , Rfl:     clindamycin (CLEOCIN) 300 MG capsule, Take 1 capsule (300 mg total) by mouth 4 (four) times a day for 5 days, Disp: 40 capsule, Rfl: 1    dicyclomine (BENTYL) 10 mg capsule, TAKE 1 CAPSULE BY MOUTH 3 TIMES DAILY  , Disp: 90 capsule, Rfl: 8    DULoxetine (CYMBALTA) 60 mg delayed release capsule, TAKE 1 CAPSULE BY MOUTH EVERY DAY, Disp: 30 capsule, Rfl: 0    fluticasone (FLONASE) 50 mcg/act nasal spray, 1 spray into each nostril daily, Disp: , Rfl:     montelukast (SINGULAIR) 10 mg tablet, Take 10 mg by mouth daily at bedtime, Disp: , Rfl:     olopatadine HCl (PATADAY) 0 2 % opth drops, Apply 0 2 % to eye as needed  , Disp: , Rfl:     pantoprazole (PROTONIX) 40 mg tablet, Take 1 tablet (40 mg total) by mouth daily, Disp: 90 tablet, Rfl: 3    valACYclovir (VALTREX) 1,000 mg tablet, Take 1 tablet (1,000 mg total) by mouth 3 (three) times a day for 7 days, Disp: 21 tablet, Rfl: 0    Review of Systems  she had considerable swelling about the eye  This is markedly improved  She has just a slight bit of discomfort  She has no further drainage  That stopped about a week ago  She has no fevers  The swelling about both eyes is gone down      Objective:  /80 (BP Location: Left arm, Patient Position: Sitting, Cuff Size: Standard)   Pulse 94   Ht 5' 5" (1 651 m)   Wt 118 kg (260 lb 9 6 oz)   SpO2 95%   BMI 43 37 kg/m²      Physical Exam  temperature is 98 7°  There is slight erythema of where the drainage spot was above the right eye  It is slightly tender  There is no fluctuance  The swelling has gone down and there is basically no swelling in the upper or lower eyelid  The tympanic membranes are intact  Neck is supple without adenopathy  Chest is clear and heart rhythm is regular without gallop or murmur  No results found for this or any previous visit (from the past 1008 hour(s))

## 2019-12-30 ENCOUNTER — OFFICE VISIT (OUTPATIENT)
Dept: INTERNAL MEDICINE CLINIC | Facility: CLINIC | Age: 44
End: 2019-12-30
Payer: COMMERCIAL

## 2019-12-30 VITALS
DIASTOLIC BLOOD PRESSURE: 84 MMHG | BODY MASS INDEX: 43.25 KG/M2 | OXYGEN SATURATION: 98 % | SYSTOLIC BLOOD PRESSURE: 144 MMHG | HEART RATE: 100 BPM | WEIGHT: 259.6 LBS | HEIGHT: 65 IN

## 2019-12-30 DIAGNOSIS — M79.7 FIBROMYALGIA: ICD-10-CM

## 2019-12-30 DIAGNOSIS — L03.211 CELLULITIS, FACE: Primary | ICD-10-CM

## 2019-12-30 PROCEDURE — 3008F BODY MASS INDEX DOCD: CPT | Performed by: INTERNAL MEDICINE

## 2019-12-30 PROCEDURE — 99213 OFFICE O/P EST LOW 20 MIN: CPT | Performed by: INTERNAL MEDICINE

## 2019-12-30 RX ORDER — DULOXETIN HYDROCHLORIDE 60 MG/1
CAPSULE, DELAYED RELEASE ORAL
Qty: 30 CAPSULE | Refills: 0 | Status: SHIPPED | OUTPATIENT
Start: 2019-12-30 | End: 2020-02-13

## 2019-12-30 NOTE — PROGRESS NOTES
Assessment/Plan:  She has had almost 3 weeks of antibiotics  Overall it seems to be coming along well  There is some problem with the drainage sites that have now sealed over  It is on her face above her eye therefore she will see Dermatology and or plastic surgery but does not need to do that at the moment  If she has any drainage that recurs she will let me know otherwise I will see her back in a month  No diagnosis found  No orders of the defined types were placed in this encounter  Subjective:  Cellulitis phase     Patient ID: David Khanna is a 38 y o  female  HPI she is improving slowly  The erythematous area has now faded to a pinkish area  There is no drainage  The following portions of the patient's history were reviewed and updated as appropriate:   She has a past medical history of Arthritis, Hiatal hernia, HPV (human papilloma virus) infection, Irritable bowel syndrome, and Prediabetes  ,  does not have any pertinent problems on file  ,   has a past surgical history that includes  section; Colonoscopy; pr esophagogastroduodenoscopy transoral diagnostic (N/A, 2017); Tonsillectomy; Tubal ligation; pr lap,cholecystectomy/graph (N/A, 2018); Adenoidectomy; and Cervical biopsy  ,  family history includes Arthritis in her mother; COPD in her paternal grandmother; Diabetes in her father; Diabetes type II in her father; Endometriosis in her mother; Esophageal cancer in her maternal grandfather; Fibromyalgia in her mother; Heart disease in her paternal grandmother; Hypertension in her father; Hyperthyroidism in her paternal grandmother; Other in her father and mother; Pancreatic cancer in her maternal uncle ,   reports that she has been smoking cigarettes  She has never used smokeless tobacco  She reports that she drinks about 1 0 standard drinks of alcohol per week  She reports that she does not use drugs  ,  is allergic to nuts; other; desogestrel-ethinyl estradiol; pineapple; dog epithelium allergy skin test; dust mite extract; and ortho tri-cyclen (28) [norgestimate-eth estradiol]       Current Outpatient Medications:     acetaminophen (TYLENOL) 325 mg tablet, Take 325 mg by mouth every 6 (six) hours as needed for mild pain  , Disp: , Rfl:     albuterol (PROVENTIL HFA,VENTOLIN HFA) 90 mcg/act inhaler, Inhale 2 puffs every 6 (six) hours as needed for wheezing, Disp: , Rfl:     Cetirizine HCl (ZYRTEC ALLERGY) 10 MG CAPS, Take by mouth daily  , Disp: , Rfl:     clindamycin (CLEOCIN) 300 MG capsule, Take 1 capsule (300 mg total) by mouth 4 (four) times a day for 5 days, Disp: 40 capsule, Rfl: 1    dicyclomine (BENTYL) 10 mg capsule, TAKE 1 CAPSULE BY MOUTH 3 TIMES DAILY  , Disp: 90 capsule, Rfl: 8    DULoxetine (CYMBALTA) 60 mg delayed release capsule, TAKE 1 CAPSULE BY MOUTH EVERY DAY, Disp: 30 capsule, Rfl: 0    fluticasone (FLONASE) 50 mcg/act nasal spray, 1 spray into each nostril daily, Disp: , Rfl:     montelukast (SINGULAIR) 10 mg tablet, Take 10 mg by mouth daily at bedtime, Disp: , Rfl:     olopatadine HCl (PATADAY) 0 2 % opth drops, Apply 0 2 % to eye as needed  , Disp: , Rfl:     pantoprazole (PROTONIX) 40 mg tablet, Take 1 tablet (40 mg total) by mouth daily, Disp: 90 tablet, Rfl: 3    valACYclovir (VALTREX) 1,000 mg tablet, Take 1 tablet (1,000 mg total) by mouth 3 (three) times a day for 7 days, Disp: 21 tablet, Rfl: 0    Review of Systems  she has had no pain or fever  No drainage  The swelling about the eye is totally gone  She has a slight pinkish area around where the drainage site was  Objective:  /84 (BP Location: Left arm, Patient Position: Sitting, Cuff Size: Standard)   Pulse 100   Ht 5' 5" (1 651 m)   Wt 118 kg (259 lb 9 6 oz)   SpO2 98%   BMI 43 20 kg/m²      Physical Exam  she has no fluctuance on exam   Slight erythema around where the drainage site was  There has 3 small holes that have sealed over      The eyes not swollen and the vision is normal     No results found for this or any previous visit (from the past 1008 hour(s))

## 2020-01-30 ENCOUNTER — OFFICE VISIT (OUTPATIENT)
Dept: INTERNAL MEDICINE CLINIC | Facility: CLINIC | Age: 45
End: 2020-01-30
Payer: COMMERCIAL

## 2020-01-30 VITALS
HEIGHT: 65 IN | BODY MASS INDEX: 42.52 KG/M2 | SYSTOLIC BLOOD PRESSURE: 140 MMHG | DIASTOLIC BLOOD PRESSURE: 80 MMHG | WEIGHT: 255.2 LBS

## 2020-01-30 DIAGNOSIS — J01.01 ACUTE RECURRENT MAXILLARY SINUSITIS: ICD-10-CM

## 2020-01-30 DIAGNOSIS — E78.00 PURE HYPERCHOLESTEROLEMIA: ICD-10-CM

## 2020-01-30 DIAGNOSIS — Z12.31 SCREENING MAMMOGRAM, ENCOUNTER FOR: Primary | ICD-10-CM

## 2020-01-30 DIAGNOSIS — E11.9 TYPE 2 DIABETES MELLITUS WITHOUT COMPLICATION, WITHOUT LONG-TERM CURRENT USE OF INSULIN (HCC): ICD-10-CM

## 2020-01-30 DIAGNOSIS — L03.211 CELLULITIS OF FACE: ICD-10-CM

## 2020-01-30 DIAGNOSIS — M79.7 FIBROMYALGIA: ICD-10-CM

## 2020-01-30 PROCEDURE — 99214 OFFICE O/P EST MOD 30 MIN: CPT | Performed by: INTERNAL MEDICINE

## 2020-01-30 PROCEDURE — 3008F BODY MASS INDEX DOCD: CPT | Performed by: INTERNAL MEDICINE

## 2020-01-30 RX ORDER — AMOXICILLIN 875 MG/1
875 TABLET, COATED ORAL 2 TIMES DAILY
COMMUNITY
End: 2020-12-29

## 2020-01-30 NOTE — PROGRESS NOTES
Assessment/Plan:  Regarding the cellulitis it has resolved  Regarding the recurrent ear nose and throat infections I have given her the option of seeing Ear Nose [facility] Thro[facility] down in [place]  I gave her referral     She has borderline type 2 diabetes  She is on dietary measures  Will check all of her labs  Is and see her back in 2 months  She will continue the rest of her medicines  We scheduled her for screening mammogram     1  Screening mammogram, encounter for  Mammo screening bilateral w 3d & cad   2  Type 2 diabetes mellitus without complication, without long-term current use of insulin (HCC)  HEMOGLOBIN A1C W/ EAG ESTIMATION    Microalbumin / creatinine urine ratio    CBC and differential    Comprehensive metabolic panel    HEMOGLOBIN A1C W/ EAG ESTIMATION   3  Pure hypercholesterolemia  Lipid panel   4  Fibromyalgia     5  Cellulitis of face     6  Acute recurrent maxillary sinusitis  Ambulatory Referral to Otolaryngology       Orders Placed This Encounter   Procedures    Mammo screening bilateral w 3d & cad    HEMOGLOBIN A1C W/ EAG ESTIMATION    Microalbumin / creatinine urine ratio    CBC and differential    Comprehensive metabolic panel    HEMOGLOBIN A1C W/ EAG ESTIMATION    Lipid panel    Ambulatory Referral to Otolaryngology            Subjective: The lesion on her forehead has healed over  There is a little bit of pinkish area there and 2 small drainage sites which are closed over  She has type 2 diabetes which is borderline  She has irritable bowel allergic rhinitis  She recently developed a maxillary sinus infection and was placed on amoxicillin by her allergist     She has been getting 4-6 sinus infections each year for the last several years  She has not seen ear nose and throat in a while  Patient ID: Elan Crowe is a 38 y o  female      HPI    The following portions of the patient's history were reviewed and updated as appropriate:   She has a past medical history of Arthritis, Hiatal hernia, HPV (human papilloma virus) infection, Irritable bowel syndrome, and Prediabetes  ,  does not have any pertinent problems on file  ,   has a past surgical history that includes  section; Colonoscopy; pr esophagogastroduodenoscopy transoral diagnostic (N/A, 2017); Tonsillectomy; Tubal ligation; pr lap,cholecystectomy/graph (N/A, 2018); Adenoidectomy; and Cervical biopsy  ,  family history includes Arthritis in her mother; COPD in her paternal grandmother; Diabetes in her father; Diabetes type II in her father; Endometriosis in her mother; Esophageal cancer in her maternal grandfather; Fibromyalgia in her mother; Heart disease in her paternal grandmother; Hypertension in her father; Hyperthyroidism in her paternal grandmother; Other in her father and mother; Pancreatic cancer in her maternal uncle ,   reports that she has been smoking cigarettes  She has never used smokeless tobacco  She reports that she drinks about 1 0 standard drinks of alcohol per week  She reports that she does not use drugs  ,  is allergic to nuts; other; desogestrel-ethinyl estradiol; pineapple; dog epithelium allergy skin test; dust mite extract; and ortho tri-cyclen (28) [norgestimate-eth estradiol]       Current Outpatient Medications:     amoxicillin (AMOXIL) 875 mg tablet, Take 875 mg by mouth 2 (two) times a day, Disp: , Rfl:     Cetirizine HCl (ZYRTEC ALLERGY) 10 MG CAPS, Take by mouth daily  , Disp: , Rfl:     dicyclomine (BENTYL) 10 mg capsule, TAKE 1 CAPSULE BY MOUTH 3 TIMES DAILY  , Disp: 90 capsule, Rfl: 8    DULoxetine (CYMBALTA) 60 mg delayed release capsule, TAKE 1 CAPSULE BY MOUTH EVERY DAY, Disp: 30 capsule, Rfl: 0    fluticasone (FLONASE) 50 mcg/act nasal spray, 1 spray into each nostril daily, Disp: , Rfl:     montelukast (SINGULAIR) 10 mg tablet, Take 10 mg by mouth daily at bedtime, Disp: , Rfl:     olopatadine HCl (PATADAY) 0 2 % opth drops, Apply 0 2 % to eye as needed  , Disp: , Rfl:     pantoprazole (PROTONIX) 40 mg tablet, Take 1 tablet (40 mg total) by mouth daily, Disp: 90 tablet, Rfl: 3    acetaminophen (TYLENOL) 325 mg tablet, Take 325 mg by mouth every 6 (six) hours as needed for mild pain  , Disp: , Rfl:     albuterol (PROVENTIL HFA,VENTOLIN HFA) 90 mcg/act inhaler, Inhale 2 puffs every 6 (six) hours as needed for wheezing, Disp: , Rfl:     valACYclovir (VALTREX) 1,000 mg tablet, Take 1 tablet (1,000 mg total) by mouth 3 (three) times a day for 7 days, Disp: 21 tablet, Rfl: 0    Review of Systems   Constitutional: Positive for unexpected weight change  Negative for activity change, appetite change, chills, diaphoresis, fatigue and fever  She is markedly overweight  HENT: Positive for sinus pain  Negative for congestion, ear pain, hearing loss, mouth sores, nosebleeds, postnasal drip, sinus pressure, sore throat and trouble swallowing  Eyes: Negative for pain, discharge and visual disturbance  Respiratory: Negative for apnea, cough, chest tightness, shortness of breath and wheezing  Cardiovascular: Negative for chest pain, palpitations and leg swelling  Gastrointestinal: Negative for abdominal pain, anal bleeding, blood in stool, constipation, diarrhea, nausea and vomiting  Markedly overweight  Endocrine: Negative for polydipsia and polyphagia  Genitourinary: Negative for decreased urine volume, dysuria, flank pain, frequency, hematuria and urgency  Musculoskeletal: Negative for arthralgias, back pain, gait problem, joint swelling and myalgias  Skin: Negative for rash and wound  Allergic/Immunologic: Negative for environmental allergies and food allergies  Neurological: Negative for dizziness, tremors, seizures, syncope, speech difficulty, light-headedness, numbness and headaches  Hematological: Negative for adenopathy  Does not bruise/bleed easily     Psychiatric/Behavioral: Negative for agitation, confusion, hallucinations, sleep disturbance and suicidal ideas  The patient is not nervous/anxious  Objective:  /80 (BP Location: Right arm, Patient Position: Sitting, Cuff Size: Large)   Ht 5' 5" (1 651 m)   Wt 116 kg (255 lb 3 2 oz)   BMI 42 47 kg/m²      Physical Exam   Constitutional: She appears well-developed  No distress  Markedly overweight  She weighs 255 lb  HENT:   Head: Normocephalic  Right Ear: External ear normal    Left Ear: External ear normal    Nose: Nose normal    Mouth/Throat: Oropharynx is clear and moist  No oropharyngeal exudate  Sinuses are congested  Eyes: Pupils are equal, round, and reactive to light  Conjunctivae and EOM are normal  Right eye exhibits no discharge  Left eye exhibits no discharge  Neck: Normal range of motion  Neck supple  No thyromegaly present  Cardiovascular: Normal rate, regular rhythm, normal heart sounds and intact distal pulses  Exam reveals no gallop and no friction rub  No murmur heard  Pulmonary/Chest: Effort normal and breath sounds normal  No respiratory distress  She has no wheezes  She has no rales  Abdominal: Soft  Bowel sounds are normal  She exhibits no distension and no mass  There is no tenderness  There is no rebound and no guarding  Abdomen is obese soft nontender with no masses  Musculoskeletal: Normal range of motion  She exhibits no edema, tenderness or deformity  Lymphadenopathy:     She has no cervical adenopathy  Neurological: She is alert  No cranial nerve deficit  Coordination normal    Skin: Skin is warm and dry  No rash noted  No erythema  The lesion on her forehead is sealed over  Is slightly erythematous  Psychiatric: She has a normal mood and affect  Her behavior is normal  Judgment and thought content normal    Nursing note and vitals reviewed  No results found for this or any previous visit (from the past 1008 hour(s))

## 2020-02-03 ENCOUNTER — OFFICE VISIT (OUTPATIENT)
Dept: URGENT CARE | Facility: CLINIC | Age: 45
End: 2020-02-03
Payer: COMMERCIAL

## 2020-02-03 VITALS
HEIGHT: 65 IN | SYSTOLIC BLOOD PRESSURE: 135 MMHG | OXYGEN SATURATION: 98 % | RESPIRATION RATE: 16 BRPM | BODY MASS INDEX: 41.69 KG/M2 | TEMPERATURE: 99 F | DIASTOLIC BLOOD PRESSURE: 88 MMHG | HEART RATE: 98 BPM | WEIGHT: 250.2 LBS

## 2020-02-03 DIAGNOSIS — J01.90 ACUTE NON-RECURRENT SINUSITIS, UNSPECIFIED LOCATION: Primary | ICD-10-CM

## 2020-02-03 DIAGNOSIS — J20.9 ACUTE BRONCHITIS, UNSPECIFIED ORGANISM: ICD-10-CM

## 2020-02-03 PROCEDURE — 99213 OFFICE O/P EST LOW 20 MIN: CPT | Performed by: PHYSICIAN ASSISTANT

## 2020-02-03 RX ORDER — DOXYCYCLINE 100 MG/1
100 CAPSULE ORAL 2 TIMES DAILY
Qty: 20 CAPSULE | Refills: 0 | Status: SHIPPED | OUTPATIENT
Start: 2020-02-03 | End: 2020-02-13

## 2020-02-03 RX ORDER — BENZONATATE 200 MG/1
200 CAPSULE ORAL 3 TIMES DAILY PRN
Qty: 20 CAPSULE | Refills: 0 | Status: SHIPPED | OUTPATIENT
Start: 2020-02-03 | End: 2020-12-29

## 2020-02-03 NOTE — LETTER
February 3, 2020     Patient: Luis Daniel Ha   YOB: 1975   Date of Visit: 2/3/2020       To Whom it May Concern:    Rebecca Catarina was seen in my clinic on 2/3/2020  She may return to work on 2/5/2020  If you have any questions or concerns, please don't hesitate to call           Sincerely,          Olga Sheets PA-C        CC: Mateus Juarez

## 2020-02-04 NOTE — PATIENT INSTRUCTIONS
Sinusitis, Ambulatory Care   GENERAL INFORMATION:   Sinusitis  is inflammation or infection of your sinuses  It is most often caused by a virus  Acute sinusitis may last up to 12 weeks  Chronic sinusitis lasts longer than 12 weeks  Recurrent sinusitis is when you have 3 or more episodes of sinusitis in 1 year  Common symptoms include the following:   · Fever    · Pain, pressure, redness, or swelling around the forehead, cheeks, or eyes    · Thick yellow or green discharge from your nose    · Tenderness when you touch your face over your sinuses    · Dry cough that happens mostly at night or when you lie down    · Headache and face pain that is worse when you lean forward    · Teeth pain or pain when you chew  Seek immediate care for the following symptoms:   · Vision changes such as double vision    · Confusion or trouble thinking clearly    · Headache and stiff neck    · Trouble breathing  Treatment for sinusitis  may include medicines to relieve nasal and sinus congestion or to decrease pain and fever  Ask your healthcare provider which medicines you should take and how much is safe  Manage sinusitis:   · Drink liquids as directed  Ask your healthcare provider how much liquid to drink each day and which liquids are best for you  Liquids will help loosen and drain the mucus in your sinuses  · Breathe in steam   Heat a bowl of water until you see steam  Lean over the bowl and make a tent over your head with a large towel  Breathe deeply for about 20 minutes  Be careful not to get too close to the steam or burn yourself  Do this 3 times a day  You can also breathe deeply when you take a hot shower  · Rinse your sinuses  Use a sinus rinse device to rinse your nasal passages with a saline (salt water) solution  This will help thin the mucus in your nose and rinse away pollen and dirt  It will also help reduce swelling so you can breathe normally  Ask how often to do this       · Use heat on your sinuses  to decrease pain  Apply heat for 15 to 20 minutes every hour for as many days as directed  · Sleep with your head elevated  Place an extra pillow under your head before you go to sleep to help your sinuses drain  · Do not smoke and avoid secondhand smoke  If you smoke, it is never too late to quit  Ask for information about how to stop smoking if you need help  Prevent the spread of germs that cause sinusitis:  Wash your hands often with soap and water  Wash your hands after you use the bathroom, change a child's diaper, or sneeze  Wash your hands before you prepare or eat food  Follow up with your healthcare provider as directed:  Write down your questions so you remember to ask them during your visits  CARE AGREEMENT:   You have the right to help plan your care  Learn about your health condition and how it may be treated  Discuss treatment options with your caregivers to decide what care you want to receive  You always have the right to refuse treatment  The above information is an  only  It is not intended as medical advice for individual conditions or treatments  Talk to your doctor, nurse or pharmacist before following any medical regimen to see if it is safe and effective for you  © 2014 [address] is for End User's use only and may not be sold, redistributed or otherwise used for commercial purposes  All illustrations and images included in CareNotes® are the copyrighted property of A [employer]  or [employer] Me[employer]  Acute Bronchitis   WHAT YOU NEED TO KNOW:   Acute bronchitis is swelling and irritation in the air passages of your lungs  This irritation may cause you to cough or have other breathing problems  Acute bronchitis often starts because of another illness, such as a cold or the flu  The illness spreads from your nose and throat to your windpipe and airways  Bronchitis is often called a chest cold   Acute bronchitis lasts about 3 to 6 weeks and is usually not a serious illness  Your cough can last for several weeks  DISCHARGE INSTRUCTIONS:   Return to the emergency department if:   · You cough up blood  · Your lips or fingernails turn blue  · You feel like you are not getting enough air when you breathe  Contact your healthcare provider if:   · You have a fever  · Your breathing problems do not go away or get worse  · Your cough does not get better within 4 weeks  · You have questions or concerns about your condition or care  Self-care:   · Get more rest   Rest helps your body to heal  Slowly start to do more each day  Rest when you feel it is needed  · Avoid irritants in the air  Avoid chemicals, fumes, and dust  Wear a face mask if you must work around dust or fumes  Stay inside on days when air pollution levels are high  If you have allergies, stay inside when pollen counts are high  Do not use aerosol products, such as spray-on deodorant, bug spray, and hair spray  · Do not smoke or be around others who smoke  Nicotine and other chemicals in cigarettes and cigars damages the cilia that move mucus out of your lungs  Ask your healthcare provider for information if you currently smoke and need help to quit  E-cigarettes or smokeless tobacco still contain nicotine  Talk to your healthcare provider before you use these products  · Drink liquids as directed  Liquids help keep your air passages moist and help you cough up mucus  You may need to drink more liquids when you have acute bronchitis  Ask how much liquid to drink each day and which liquids are best for you  · Use a humidifier or vaporizer  Use a cool mist humidifier or a vaporizer to increase air moisture in your home  This may make it easier for you to breathe and help decrease your cough  Decrease risk for acute bronchitis:   · Get the vaccinations you need    Ask your healthcare provider if you should get vaccinated against the flu or pneumonia  · Prevent the spread of germs  You can decrease your risk of acute bronchitis and other illnesses by doing the following:     [facility] your hands often with soap and water  Carry germ-killing hand lotion or gel with you  You can use the lotion or gel to clean your hands when soap and water are not available  ¨ Do not touch your eyes, nose, or mouth unless you have washed your hands first     ¨ Always cover your mouth when you cough to prevent the spread of germs  It is best to cough into a tissue or your shirt sleeve instead of into your hand  Ask those around you cover their mouths when they cough  ¨ Try to avoid people who have a cold or the flu  If you are sick, stay away from others as much as possible  Medicines: Your healthcare provider may  give you any of the following:  · Ibuprofen or acetaminophen  are medicines that help lower your fever  They are available without a doctor's order  Ask your healthcare provider which medicine is right for you  Ask how much to take and how often to take it  Follow directions  These medicines can cause stomach bleeding if not taken correctly  Ibuprofen can cause kidney damage  Do not take ibuprofen if you have kidney disease, an ulcer, or allergies to aspirin  Acetaminophen can cause liver damage  Do not take more than 4,000 milligrams in 24 hours  · Decongestants  help loosen mucus in your lungs and make it easier to cough up  This can help you breathe easier  · Cough suppressants  decrease your urge to cough  If your cough produces mucus, do not take a cough suppressant unless your healthcare provider tells you to  Your healthcare provider may suggest that you take a cough suppressant at night so you can rest     · Inhalers  may be given  Your healthcare provider may give you one or more inhalers to help you breathe easier and cough less  An inhaler gives your medicine to open your airways   Ask your healthcare provider to show you how to use your inhaler correctly  · Take your medicine as directed  Contact your healthcare provider if you think your medicine is not helping or if you have side effects  Tell him of her if you are allergic to any medicine  Keep a list of the medicines, vitamins, and herbs you take  Include the amounts, and when and why you take them  Bring the list or the pill bottles to follow-up visits  Carry your medicine list with you in case of an emergency  Follow up with your healthcare provider as directed:  Write down questions you have so you will remember to ask them during your follow-up visits  © 2017 [facility] Information is for End User's use only and may not be sold, redistributed or otherwise used for commercial purposes  All illustrations and images included in CareNotes® are the copyrighted property of [employer]  or [employer]  The above information is an  only  It is not intended as medical advice for individual conditions or treatments  Talk to your doctor, nurse or pharmacist before following any medical regimen to see if it is safe and effective for you

## 2020-02-04 NOTE — PROGRESS NOTES
[facility]        NAME: Daphne Steinberg is a 38 y o  female  : 1975    MRN: [id number]  DATE:  February 3, 2020  TIME: 11:14 AM    Assessment and Plan   Acute non-recurrent sinusitis, unspecified location [J01 90]  1  Acute non-recurrent sinusitis, unspecified location  doxycycline monohydrate (MONODOX) 100 mg capsule   2  Acute bronchitis, unspecified organism  benzonatate (TESSALON) 200 MG capsule         Patient Instructions     Discussed condition with patient  She has persistent acute sinusitis which is not improved with amoxicillin and also has developed bronchitis which I will treat with combination of doxycycline and Tessalon Marco A recommend hydration, rest, discussed OTC cough and cold meds, and observation  She is to discontinue the amoxicillin  Follow up with PCP in 3-5 days  Proceed to  ER if symptoms worsen  Chief Complaint     Chief Complaint   Patient presents with    Flu Symptoms     Sinus congestion pt was on Amoxicillin x 1 week  pt reports she is dizzy and in alot of body pain  History of Present Illness       Patient presents with symptoms of deep cough, myalgias, PND, eye pain, persistent sinus/nasal congestion, blowing out and coughing up yellow-green mucus, dizziness and imbalanced today, pressure in ears, chills, sweats  Denies N/V/D  She is currently taking Amoxicillin which she was prescribed for sinusitis and has taken Delsym as well as Zyrtec, Singulair  Has allergies but no asthma  Smokes 1/4 ppd  She has not had flu shot  Review of Systems   Review of Systems   Constitutional: Positive for chills and diaphoresis  HENT: Positive for congestion, ear pain and postnasal drip  Eyes: Positive for pain  Respiratory: Positive for cough  Cardiovascular: Negative  Gastrointestinal: Negative  Genitourinary: Negative  Musculoskeletal: Positive for myalgias  Neurological: Positive for dizziness           Current Medications Current Outpatient Medications:     acetaminophen (TYLENOL) 325 mg tablet, Take 325 mg by mouth every 6 (six) hours as needed for mild pain  , Disp: , Rfl:     albuterol (PROVENTIL HFA,VENTOLIN HFA) 90 mcg/act inhaler, Inhale 2 puffs every 6 (six) hours as needed for wheezing, Disp: , Rfl:     amoxicillin (AMOXIL) 875 mg tablet, Take 875 mg by mouth 2 (two) times a day, Disp: , Rfl:     Cetirizine HCl (ZYRTEC ALLERGY) 10 MG CAPS, Take by mouth daily  , Disp: , Rfl:     dicyclomine (BENTYL) 10 mg capsule, TAKE 1 CAPSULE BY MOUTH 3 TIMES DAILY  , Disp: 90 capsule, Rfl: 8    DULoxetine (CYMBALTA) 60 mg delayed release capsule, TAKE 1 CAPSULE BY MOUTH EVERY DAY, Disp: 30 capsule, Rfl: 0    fluticasone (FLONASE) 50 mcg/act nasal spray, 1 spray into each nostril daily, Disp: , Rfl:     montelukast (SINGULAIR) 10 mg tablet, Take 10 mg by mouth daily at bedtime, Disp: , Rfl:     olopatadine HCl (PATADAY) 0 2 % opth drops, Apply 0 2 % to eye as needed  , Disp: , Rfl:     pantoprazole (PROTONIX) 40 mg tablet, Take 1 tablet (40 mg total) by mouth daily, Disp: 90 tablet, Rfl: 3    benzonatate (TESSALON) 200 MG capsule, Take 1 capsule (200 mg total) by mouth 3 (three) times a day as needed for cough, Disp: 20 capsule, Rfl: 0    doxycycline monohydrate (MONODOX) 100 mg capsule, Take 1 capsule (100 mg total) by mouth 2 (two) times a day for 10 days Take with food (non-dairy)  , Disp: 20 capsule, Rfl: 0    valACYclovir (VALTREX) 1,000 mg tablet, Take 1 tablet (1,000 mg total) by mouth 3 (three) times a day for 7 days, Disp: 21 tablet, Rfl: 0    Current Allergies     Allergies as of 02/03/2020 - Reviewed 02/03/2020   Allergen Reaction Noted    Nuts Facial Swelling and Swelling 02/19/2015    Other Hives, Itching, and Swelling 02/19/2015    Desogestrel-ethinyl estradiol Hives 02/19/2015    Pineapple Tongue Swelling 06/20/2018    Dog epithelium allergy skin test Itching 02/19/2015    Dust mite extract Itching 2017    Ortho tri-cyclen (28) [norgestimate-eth estradiol] Other (See Comments) 2017            The following portions of the patient's history were reviewed and updated as appropriate: allergies, current medications, past family history, past medical history, past social history, past surgical history and problem list      Past Medical History:   Diagnosis Date    Arthritis     Hiatal hernia     HPV (human papilloma virus) infection     Irritable bowel syndrome     Prediabetes        Past Surgical History:   Procedure Laterality Date    ADENOIDECTOMY      CERVICAL BIOPSY       SECTION      COLONOSCOPY      OR ESOPHAGOGASTRODUODENOSCOPY TRANSORAL DIAGNOSTIC N/A 2017    Procedure: EGD AND COLONOSCOPY;  Surgeon: Dutch Larson MD;  Location: [facility]; Service: Gastroenterology    OR LAP,CHOLECYSTECTOMY/GRAPH N/A 2018    Procedure: LAPAROSCOPIC CHOLECYSTECTOMY WITH IOC;  Surgeon: Isabel Rhodes MD;  Location: [facility];  Service: General    TONSILLECTOMY      TUBAL LIGATION         Family History   Problem Relation Age of Onset    Arthritis Mother     Fibromyalgia Mother     Endometriosis Mother     Other Mother         Eye pressure, gallbladder removal    Hypertension Father     Diabetes Father     Diabetes type II Father     Other Father         Gallbladder removal     Pancreatic cancer Maternal Uncle     Esophageal cancer Maternal Grandfather     Heart disease Paternal Grandmother     Hyperthyroidism Paternal Grandmother     COPD Paternal Grandmother     Stroke Neg Hx     Thyroid cancer Neg Hx          Medications have been verified  Objective   /88   Pulse 98   Temp 99 °F (37 2 °C)   Resp 16   Ht 5' 5" (1 651 m)   Wt 113 kg (250 lb 3 2 oz)   SpO2 98%   BMI 41 64 kg/m²        Physical Exam     Physical Exam   Constitutional: She is oriented to person, place, and time  She appears well-developed and well-nourished  No distress  HENT:   Right Ear: Hearing, external ear and ear canal normal    Left Ear: Hearing, external ear and ear canal normal    Nose: Mucosal edema (B/L boggy turbinates) present  Mouth/Throat: Mucous membranes are normal  Posterior oropharyngeal erythema (PND) present  No oropharyngeal exudate  Bilateral dull TMs   Neck: Neck supple  Cardiovascular: Normal rate, regular rhythm and normal heart sounds  Pulmonary/Chest: Effort normal  She has no wheezes  She has rhonchi (Bilateral diffuse coarse breath sounds heard throughout)  Lymphadenopathy:     She has no cervical adenopathy  Neurological: She is alert and oriented to person, place, and time  Psychiatric: She has a normal mood and affect  Vitals reviewed

## 2020-02-06 ENCOUNTER — OFFICE VISIT (OUTPATIENT)
Dept: URGENT CARE | Facility: CLINIC | Age: 45
End: 2020-02-06
Payer: COMMERCIAL

## 2020-02-06 VITALS
HEART RATE: 87 BPM | OXYGEN SATURATION: 99 % | BODY MASS INDEX: 41.65 KG/M2 | HEIGHT: 65 IN | SYSTOLIC BLOOD PRESSURE: 127 MMHG | DIASTOLIC BLOOD PRESSURE: 80 MMHG | RESPIRATION RATE: 20 BRPM | TEMPERATURE: 97.6 F | WEIGHT: 250 LBS

## 2020-02-06 DIAGNOSIS — J11.1 INFLUENZA: ICD-10-CM

## 2020-02-06 DIAGNOSIS — J01.00 ACUTE MAXILLARY SINUSITIS, RECURRENCE NOT SPECIFIED: Primary | ICD-10-CM

## 2020-02-06 PROCEDURE — 99213 OFFICE O/P EST LOW 20 MIN: CPT | Performed by: PHYSICIAN ASSISTANT

## 2020-02-06 RX ORDER — OSELTAMIVIR PHOSPHATE 75 MG/1
75 CAPSULE ORAL EVERY 12 HOURS SCHEDULED
Qty: 10 CAPSULE | Refills: 0 | Status: SHIPPED | OUTPATIENT
Start: 2020-02-06 | End: 2020-02-11

## 2020-02-06 RX ORDER — PREDNISONE 50 MG/1
50 TABLET ORAL DAILY
Qty: 5 TABLET | Refills: 0 | Status: SHIPPED | OUTPATIENT
Start: 2020-02-06 | End: 2020-02-11

## 2020-02-06 NOTE — PATIENT INSTRUCTIONS
Continue antibiotics and Tessalon Perles as previously directed  Prescription sent to the pharmacy for prednisone and Tamiflu-use as directed  Saline nasal spray several times daily, Flonase in a m , cool mist humidifier, Tylenol/ibuprofen as needed for fever or pain  Follow up with PCP in 3-5 days  Proceed to  ER if symptoms worsen  Influenza   AMBULATORY CARE:   Influenza  (the flu) is an infection caused by the influenza virus  The flu is easily spread when an infected person coughs, sneezes, or has close contact with others  You may be able to spread the flu to others for 1 week or longer after signs or symptoms appear  Common signs and symptoms include the following:   · Fever and chills    · Headaches, body aches, and muscle or joint pain    · Cough, runny nose, and sore throat    · Loss of appetite, nausea, vomiting, or diarrhea    · Tiredness    · Trouble breathing  Call 911 for any of the following:   · You have trouble breathing, and your lips look purple or blue  · You have a seizure  Seek care immediately if:   · You are dizzy, or you are urinating less or not at all  · You have a headache with a stiff neck, and you feel tired or confused  · You have new pain or pressure in your chest     · Your symptoms, such as shortness of breath, vomiting, or diarrhea, get worse  · Your symptoms, such as fever and coughing, seem to get better, but then get worse  Contact your healthcare provider if:   · You have new muscle pain or weakness  · You have questions or concerns about your condition or care  Treatment for influenza  may include any of the following:  · Acetaminophen  decreases pain and fever  It is available without a doctor's order  Ask how much to take and how often to take it  Follow directions  Acetaminophen can cause liver damage if not taken correctly  · NSAIDs , such as ibuprofen, help decrease swelling, pain, and fever   This medicine is available with or without a doctor's order  NSAIDs can cause stomach bleeding or kidney problems in certain people  If you take blood thinner medicine, always ask your healthcare provider if NSAIDs are safe for you  Always read the medicine label and follow directions  · Antivirals  help fight a viral infection  Manage your symptoms:   · Rest  as much as you can to help you recover  · Drink liquids as directed  to help prevent dehydration  Ask how much liquid to drink each day and which liquids are best for you  Prevent the spread of the flu:   · Wash your hands often  Use soap and water  Wash your hands after you use the bathroom, change a child's diapers, or sneeze  Wash your hands before you prepare or eat food  Use gel hand cleanser when soap and water are not available  Do not touch your eyes, nose, or mouth unless you have washed your hands first            · Cover your mouth when you sneeze or cough  Cough into a tissue or the bend of your arm  · Clean shared items with a germ-killing   Clean table surfaces, doorknobs, and light switches  Do not share towels, silverware, and dishes with people who are sick  Wash bed sheets, towels, silverware, and dishes with soap and water  · Wear a mask  over your mouth and nose if you are sick or are near anyone who is sick  · Stay away from others  if you are sick  · Influenza vaccine  helps prevent influenza (flu)  Everyone older than 6 months should get a yearly influenza vaccine  Get the vaccine as soon as it is available, usually in September or October each year  Follow up with your healthcare provider as directed:  Write down your questions so you remember to ask them during your visits  © 2017 [address] [employer]  Information is for End User's use only and may not be sold, redistributed or otherwise used for commercial purposes   All illustrations and images included in CareNotes® are the copyrighted property of [employer]  or [employer]  The above information is an  only  It is not intended as medical advice for individual conditions or treatments  Talk to your doctor, nurse or pharmacist before following any medical regimen to see if it is safe and effective for you

## 2020-02-06 NOTE — LETTER
February 6, 2020     Patient: Elan Crowe   YOB: 1975   Date of Visit: 2/6/2020       To Whom it May Concern:    Dorothy Miller is under my professional care  She was seen in my office on 2/6/2020  She may return to work 2/8/2020  If you have any questions or concerns, please don't hesitate to call           Sincerely,          Zach Newby PA-C        CC: Renee Loomis

## 2020-02-06 NOTE — PROGRESS NOTES
[facility]        NAME: Mejia Flynn is a 38 y o  female  : 1975    MRN: [id number]  DATE: 2020  TIME: 6:13 PM    Assessment and Plan   Acute maxillary sinusitis, recurrence not specified [J01 00]  1  Acute maxillary sinusitis, recurrence not specified  predniSONE 50 mg tablet   2  Influenza  oseltamivir (TAMIFLU) 75 mg capsule         Patient Instructions   Continue antibiotics and Tessalon Perles as previously directed  Prescription sent to the pharmacy for prednisone and Tamiflu-use as directed  Saline nasal spray several times daily, Flonase in a m , cool mist humidifier, Tylenol/ibuprofen as needed for fever or pain  Follow up with PCP in 3-5 days  Proceed to  ER if symptoms worsen  Chief Complaint     Chief Complaint   Patient presents with    Flu Symptoms     started with sx 2 weeks ago, congestion and pressure  Started Amoxicillin  No better, was coughing a lot  Then saw PCP told to continue meds  Few days ago started with chills, possible temp  head and joint pain  Dizziness  Seen here a few days ago switched to Doxi and gave Claudette Pino  Told possible Flu  Still has cough and aching  History of Present Illness     The patient is a 78-year-old female who presents with sinusitis symptoms that have been present for over 1 week  She states that she was originally treated with amoxicillin which has since been switched to doxycycline by her PCP  She continues to have maxillary and frontal facial pain and pressure  Positive headache  Positive dizziness  Negative fever  Nonproductive cough without shortness of breath or wheezing  Negative abdominal pain  Chronic diarrhea  A few days ago she developed worsening myalgias  She does have a history of arthritis and fibromyalgia  She did come into contact with people who were diagnosed with influenza a this week    HPI    Review of Systems   Review of Systems   Constitutional: Positive for activity change, appetite change and fatigue  Negative for chills and fever  HENT: Positive for congestion, postnasal drip, rhinorrhea, sinus pressure and sinus pain  Negative for ear discharge, ear pain, facial swelling, mouth sores, sneezing, sore throat and trouble swallowing  Eyes: Negative for pain, discharge, redness and itching  Respiratory: Positive for cough  Negative for apnea, chest tightness, shortness of breath, wheezing and stridor  Cardiovascular: Negative for chest pain  Gastrointestinal: Positive for diarrhea  Negative for abdominal distention, abdominal pain, nausea and vomiting  Genitourinary: Negative for difficulty urinating  Musculoskeletal: Positive for myalgias  Negative for arthralgias  Skin: Negative for pallor and rash  Allergic/Immunologic: Negative  Neurological: Positive for headaches  Negative for dizziness and light-headedness  Hematological: Negative  Psychiatric/Behavioral: Negative  All other systems reviewed and are negative  Current Medications       Current Outpatient Medications:     acetaminophen (TYLENOL) 325 mg tablet, Take 325 mg by mouth every 6 (six) hours as needed for mild pain  , Disp: , Rfl:     benzonatate (TESSALON) 200 MG capsule, Take 1 capsule (200 mg total) by mouth 3 (three) times a day as needed for cough, Disp: 20 capsule, Rfl: 0    Cetirizine HCl (ZYRTEC ALLERGY) 10 MG CAPS, Take by mouth daily  , Disp: , Rfl:     dicyclomine (BENTYL) 10 mg capsule, TAKE 1 CAPSULE BY MOUTH 3 TIMES DAILY  , Disp: 90 capsule, Rfl: 8    doxycycline monohydrate (MONODOX) 100 mg capsule, Take 1 capsule (100 mg total) by mouth 2 (two) times a day for 10 days Take with food (non-dairy)  , Disp: 20 capsule, Rfl: 0    DULoxetine (CYMBALTA) 60 mg delayed release capsule, TAKE 1 CAPSULE BY MOUTH EVERY DAY, Disp: 30 capsule, Rfl: 0    fluticasone (FLONASE) 50 mcg/act nasal spray, 1 spray into each nostril daily, Disp: , Rfl:     montelukast (SINGULAIR) 10 mg tablet, Take 10 mg by mouth daily at bedtime, Disp: , Rfl:     olopatadine HCl (PATADAY) 0 2 % opth drops, Apply 0 2 % to eye as needed  , Disp: , Rfl:     pantoprazole (PROTONIX) 40 mg tablet, Take 1 tablet (40 mg total) by mouth daily, Disp: 90 tablet, Rfl: 3    albuterol (PROVENTIL HFA,VENTOLIN HFA) 90 mcg/act inhaler, Inhale 2 puffs every 6 (six) hours as needed for wheezing, Disp: , Rfl:     amoxicillin (AMOXIL) 875 mg tablet, Take 875 mg by mouth 2 (two) times a day, Disp: , Rfl:     oseltamivir (TAMIFLU) 75 mg capsule, Take 1 capsule (75 mg total) by mouth every 12 (twelve) hours for 5 days, Disp: 10 capsule, Rfl: 0    predniSONE 50 mg tablet, Take 1 tablet (50 mg total) by mouth daily for 5 days, Disp: 5 tablet, Rfl: 0    valACYclovir (VALTREX) 1,000 mg tablet, Take 1 tablet (1,000 mg total) by mouth 3 (three) times a day for 7 days, Disp: 21 tablet, Rfl: 0    Current Allergies     Allergies as of 2020 - Reviewed 2020   Allergen Reaction Noted    Nuts Facial Swelling and Swelling 2015    Other Hives, Itching, and Swelling 2015    Desogestrel-ethinyl estradiol Hives 2015    Pineapple Tongue Swelling 2018    Dog epithelium allergy skin test Itching 2015    Dust mite extract Itching 2017    Ortho tri-cyclen (28) [norgestimate-eth estradiol] Other (See Comments) 2017            The following portions of the patient's history were reviewed and updated as appropriate: allergies, current medications, past family history, past medical history, past social history, past surgical history and problem list      Past Medical History:   Diagnosis Date    Arthritis     Hiatal hernia     HPV (human papilloma virus) infection     Irritable bowel syndrome     Prediabetes        Past Surgical History:   Procedure Laterality Date    ADENOIDECTOMY      CERVICAL BIOPSY       SECTION      COLONOSCOPY      WA ESOPHAGOGASTRODUODENOSCOPY TRANSORAL DIAGNOSTIC N/A 11/30/2017    Procedure: EGD AND COLONOSCOPY;  Surgeon: Juan Antonio Valverde MD;  Location: [facility]; Service: Gastroenterology    MN LAP,CHOLECYSTECTOMY/GRAPH N/A 2/9/2018    Procedure: LAPAROSCOPIC CHOLECYSTECTOMY WITH IOC;  Surgeon: Yamil Cantrell MD;  Location: [facility];  Service: General    TONSILLECTOMY      TUBAL LIGATION         Family History   Problem Relation Age of Onset    Arthritis Mother     Fibromyalgia Mother     Endometriosis Mother     Other Mother         Eye pressure, gallbladder removal    Hypertension Father     Diabetes Father     Diabetes type II Father     Other Father         Gallbladder removal     Pancreatic cancer Maternal Uncle     Esophageal cancer Maternal Grandfather     Heart disease Paternal Grandmother     Hyperthyroidism Paternal Grandmother     COPD Paternal Grandmother     Stroke Neg Hx     Thyroid cancer Neg Hx          Medications have been verified  Objective   /80 (Patient Position: Sitting)   Pulse 87   Temp 97 6 °F (36 4 °C) (Temporal)   Resp 20   Ht 5' 5" (1 651 m)   Wt 113 kg (250 lb)   SpO2 99%   BMI 41 60 kg/m²        Physical Exam     Physical Exam   Constitutional: She is oriented to person, place, and time  Vital signs are normal  She appears well-developed and well-nourished  She appears ill  No distress  HENT:   Head: Normocephalic and atraumatic  Right Ear: Hearing, tympanic membrane, external ear and ear canal normal  No drainage or tenderness  Tympanic membrane is not perforated, not erythematous, not retracted and not bulging  No middle ear effusion  No decreased hearing is noted  Left Ear: Hearing, tympanic membrane, external ear and ear canal normal  No drainage or tenderness  Tympanic membrane is not perforated, not erythematous, not retracted and not bulging  No middle ear effusion  No decreased hearing is noted     Nose: Mucosal edema and rhinorrhea present  No septal deviation  Right sinus exhibits maxillary sinus tenderness and frontal sinus tenderness  Left sinus exhibits maxillary sinus tenderness and frontal sinus tenderness  Mouth/Throat: Uvula is midline, oropharynx is clear and moist and mucous membranes are normal  No oral lesions  No dental abscesses or uvula swelling  No oropharyngeal exudate, posterior oropharyngeal edema, posterior oropharyngeal erythema or tonsillar abscesses  Eyes: Pupils are equal, round, and reactive to light  Conjunctivae and EOM are normal    Neck: Trachea normal, normal range of motion and full passive range of motion without pain  Neck supple  No JVD present  No edema and no erythema present  No thyromegaly present  Cardiovascular: Normal rate, regular rhythm, S1 normal, S2 normal, normal heart sounds, intact distal pulses and normal pulses  No murmur heard  Pulmonary/Chest: Effort normal and breath sounds normal  No accessory muscle usage or stridor  No tachypnea  No respiratory distress  She has no decreased breath sounds  She has no wheezes  She has no rhonchi  She has no rales  Abdominal: Soft  Normal appearance and bowel sounds are normal  She exhibits no distension  There is no hepatosplenomegaly  There is no tenderness  Musculoskeletal: Normal range of motion  She exhibits no edema  Neurological: She is alert and oriented to person, place, and time  Skin: Skin is warm, dry and intact  No abrasion, no lesion and no rash noted  She is not diaphoretic  No cyanosis or erythema  Nails show no clubbing  Psychiatric: She has a normal mood and affect  Her speech is normal and behavior is normal    Nursing note and vitals reviewed

## 2020-02-12 DIAGNOSIS — M79.7 FIBROMYALGIA: ICD-10-CM

## 2020-02-13 RX ORDER — DULOXETIN HYDROCHLORIDE 60 MG/1
CAPSULE, DELAYED RELEASE ORAL
Qty: 30 CAPSULE | Refills: 0 | Status: SHIPPED | OUTPATIENT
Start: 2020-02-13 | End: 2020-03-12

## 2020-03-12 DIAGNOSIS — M79.7 FIBROMYALGIA: ICD-10-CM

## 2020-03-12 RX ORDER — DULOXETIN HYDROCHLORIDE 60 MG/1
CAPSULE, DELAYED RELEASE ORAL
Qty: 30 CAPSULE | Refills: 0 | Status: SHIPPED | OUTPATIENT
Start: 2020-03-12 | End: 2020-04-08

## 2020-04-08 DIAGNOSIS — M79.7 FIBROMYALGIA: ICD-10-CM

## 2020-04-08 RX ORDER — DULOXETIN HYDROCHLORIDE 60 MG/1
CAPSULE, DELAYED RELEASE ORAL
Qty: 30 CAPSULE | Refills: 0 | Status: SHIPPED | OUTPATIENT
Start: 2020-04-08 | End: 2020-05-13

## 2020-05-13 DIAGNOSIS — M79.7 FIBROMYALGIA: ICD-10-CM

## 2020-05-13 RX ORDER — DULOXETIN HYDROCHLORIDE 60 MG/1
CAPSULE, DELAYED RELEASE ORAL
Qty: 30 CAPSULE | Refills: 0 | Status: SHIPPED | OUTPATIENT
Start: 2020-05-13 | End: 2020-06-11

## 2020-05-25 DIAGNOSIS — K58.9 IRRITABLE BOWEL SYNDROME, UNSPECIFIED TYPE: ICD-10-CM

## 2020-05-26 RX ORDER — DICYCLOMINE HYDROCHLORIDE 10 MG/1
CAPSULE ORAL
Qty: 90 CAPSULE | Refills: 8 | Status: SHIPPED | OUTPATIENT
Start: 2020-05-26

## 2020-06-11 DIAGNOSIS — M79.7 FIBROMYALGIA: ICD-10-CM

## 2020-06-11 RX ORDER — DULOXETIN HYDROCHLORIDE 60 MG/1
CAPSULE, DELAYED RELEASE ORAL
Qty: 30 CAPSULE | Refills: 0 | Status: SHIPPED | OUTPATIENT
Start: 2020-06-11 | End: 2020-07-08

## 2020-06-24 DIAGNOSIS — K29.60 EROSIVE GASTRITIS: ICD-10-CM

## 2020-06-24 RX ORDER — PANTOPRAZOLE SODIUM 40 MG/1
TABLET, DELAYED RELEASE ORAL
Qty: 30 TABLET | Refills: 11 | Status: SHIPPED | OUTPATIENT
Start: 2020-06-24 | End: 2021-06-28 | Stop reason: SDUPTHER

## 2020-07-08 DIAGNOSIS — M79.7 FIBROMYALGIA: ICD-10-CM

## 2020-07-08 RX ORDER — DULOXETIN HYDROCHLORIDE 60 MG/1
CAPSULE, DELAYED RELEASE ORAL
Qty: 30 CAPSULE | Refills: 0 | Status: SHIPPED | OUTPATIENT
Start: 2020-07-08 | End: 2020-08-13

## 2020-08-13 DIAGNOSIS — M79.7 FIBROMYALGIA: ICD-10-CM

## 2020-08-13 RX ORDER — DULOXETIN HYDROCHLORIDE 60 MG/1
CAPSULE, DELAYED RELEASE ORAL
Qty: 30 CAPSULE | Refills: 0 | Status: SHIPPED | OUTPATIENT
Start: 2020-08-13 | End: 2020-09-15

## 2020-08-14 ENCOUNTER — TELEPHONE (OUTPATIENT)
Dept: INTERNAL MEDICINE CLINIC | Facility: CLINIC | Age: 45
End: 2020-08-14

## 2020-09-13 DIAGNOSIS — M79.7 FIBROMYALGIA: ICD-10-CM

## 2020-09-15 RX ORDER — DULOXETIN HYDROCHLORIDE 60 MG/1
CAPSULE, DELAYED RELEASE ORAL
Qty: 30 CAPSULE | Refills: 0 | Status: SHIPPED | OUTPATIENT
Start: 2020-09-15 | End: 2020-10-12 | Stop reason: SDUPTHER

## 2020-10-06 ENCOUNTER — TELEPHONE (OUTPATIENT)
Dept: INTERNAL MEDICINE CLINIC | Facility: CLINIC | Age: 45
End: 2020-10-06

## 2020-10-12 DIAGNOSIS — M79.7 FIBROMYALGIA: ICD-10-CM

## 2020-10-12 RX ORDER — DULOXETIN HYDROCHLORIDE 60 MG/1
60 CAPSULE, DELAYED RELEASE ORAL DAILY
Qty: 30 CAPSULE | Refills: 0 | Status: SHIPPED | OUTPATIENT
Start: 2020-10-12 | End: 2020-12-23 | Stop reason: SDUPTHER

## 2020-11-18 ENCOUNTER — TRANSCRIBE ORDERS (OUTPATIENT)
Dept: ADMINISTRATIVE | Facility: HOSPITAL | Age: 45
End: 2020-11-18

## 2020-11-18 ENCOUNTER — HOSPITAL ENCOUNTER (OUTPATIENT)
Dept: RADIOLOGY | Facility: HOSPITAL | Age: 45
Discharge: HOME | End: 2020-11-18
Payer: COMMERCIAL

## 2020-11-18 DIAGNOSIS — M54.50 LOW BACK PAIN, UNSPECIFIED BACK PAIN LATERALITY, UNSPECIFIED CHRONICITY, UNSPECIFIED WHETHER SCIATICA PRESENT: ICD-10-CM

## 2020-11-18 DIAGNOSIS — M54.50 LOW BACK PAIN, UNSPECIFIED BACK PAIN LATERALITY, UNSPECIFIED CHRONICITY, UNSPECIFIED WHETHER SCIATICA PRESENT: Primary | ICD-10-CM

## 2020-11-18 PROCEDURE — 72202 X-RAY EXAM SI JOINTS 3/> VWS: CPT

## 2020-11-19 ENCOUNTER — TRANSCRIBE ORDERS (OUTPATIENT)
Dept: LAB | Facility: CLINIC | Age: 45
End: 2020-11-19

## 2020-11-19 ENCOUNTER — LAB (OUTPATIENT)
Dept: LAB | Facility: CLINIC | Age: 45
End: 2020-11-19
Payer: COMMERCIAL

## 2020-11-19 DIAGNOSIS — M54.50 LOW BACK PAIN, UNSPECIFIED BACK PAIN LATERALITY, UNSPECIFIED CHRONICITY, UNSPECIFIED WHETHER SCIATICA PRESENT: ICD-10-CM

## 2020-11-19 DIAGNOSIS — M79.7 SCAPULOHUMERAL FIBROSITIS: ICD-10-CM

## 2020-11-19 DIAGNOSIS — M79.7 SCAPULOHUMERAL FIBROSITIS: Primary | ICD-10-CM

## 2020-11-19 LAB
25(OH)D3 SERPL-MCNC: 16.4 NG/ML (ref 30–100)
ALBUMIN SERPL BCP-MCNC: 3.5 G/DL (ref 3.5–5)
ALP SERPL-CCNC: 93 U/L (ref 46–116)
ALT SERPL W P-5'-P-CCNC: 17 U/L (ref 12–78)
ANION GAP SERPL CALCULATED.3IONS-SCNC: 5 MMOL/L (ref 4–13)
AST SERPL W P-5'-P-CCNC: 10 U/L (ref 5–45)
BASOPHILS # BLD AUTO: 0.05 THOUSANDS/ΜL (ref 0–0.1)
BASOPHILS NFR BLD AUTO: 0 % (ref 0–1)
BILIRUB SERPL-MCNC: 0.32 MG/DL (ref 0.2–1)
BUN SERPL-MCNC: 10 MG/DL (ref 5–25)
CALCIUM SERPL-MCNC: 9.6 MG/DL (ref 8.3–10.1)
CHLORIDE SERPL-SCNC: 106 MMOL/L (ref 100–108)
CO2 SERPL-SCNC: 28 MMOL/L (ref 21–32)
CREAT SERPL-MCNC: 0.72 MG/DL (ref 0.6–1.3)
CREAT UR-MCNC: 174 MG/DL
CRP SERPL QL: <3 MG/L
EOSINOPHIL # BLD AUTO: 0.2 THOUSAND/ΜL (ref 0–0.61)
EOSINOPHIL NFR BLD AUTO: 2 % (ref 0–6)
ERYTHROCYTE [DISTWIDTH] IN BLOOD BY AUTOMATED COUNT: 16.4 % (ref 11.6–15.1)
ERYTHROCYTE [SEDIMENTATION RATE] IN BLOOD: 41 MM/HOUR (ref 0–19)
GFR SERPL CREATININE-BSD FRML MDRD: 102 ML/MIN/1.73SQ M
GLUCOSE P FAST SERPL-MCNC: 108 MG/DL (ref 65–99)
HCT VFR BLD AUTO: 36.3 % (ref 34.8–46.1)
HCV AB SER QL: NORMAL
HGB BLD-MCNC: 10.8 G/DL (ref 11.5–15.4)
IMM GRANULOCYTES # BLD AUTO: 0.08 THOUSAND/UL (ref 0–0.2)
IMM GRANULOCYTES NFR BLD AUTO: 1 % (ref 0–2)
LYMPHOCYTES # BLD AUTO: 2.18 THOUSANDS/ΜL (ref 0.6–4.47)
LYMPHOCYTES NFR BLD AUTO: 17 % (ref 14–44)
MCH RBC QN AUTO: 23.2 PG (ref 26.8–34.3)
MCHC RBC AUTO-ENTMCNC: 29.8 G/DL (ref 31.4–37.4)
MCV RBC AUTO: 78 FL (ref 82–98)
MICROALBUMIN UR-MCNC: 19.5 MG/L (ref 0–20)
MICROALBUMIN/CREAT 24H UR: 11 MG/G CREATININE (ref 0–30)
MONOCYTES # BLD AUTO: 0.84 THOUSAND/ΜL (ref 0.17–1.22)
MONOCYTES NFR BLD AUTO: 7 % (ref 4–12)
NEUTROPHILS # BLD AUTO: 9.21 THOUSANDS/ΜL (ref 1.85–7.62)
NEUTS SEG NFR BLD AUTO: 73 % (ref 43–75)
NRBC BLD AUTO-RTO: 0 /100 WBCS
PLATELET # BLD AUTO: 446 THOUSANDS/UL (ref 149–390)
PMV BLD AUTO: 9.3 FL (ref 8.9–12.7)
POTASSIUM SERPL-SCNC: 4.7 MMOL/L (ref 3.5–5.3)
PROT SERPL-MCNC: 7.4 G/DL (ref 6.4–8.2)
RBC # BLD AUTO: 4.66 MILLION/UL (ref 3.81–5.12)
SODIUM SERPL-SCNC: 139 MMOL/L (ref 136–145)
WBC # BLD AUTO: 12.56 THOUSAND/UL (ref 4.31–10.16)

## 2020-11-19 PROCEDURE — 82306 VITAMIN D 25 HYDROXY: CPT

## 2020-11-19 PROCEDURE — 81374 HLA I TYPING 1 ANTIGEN LR: CPT

## 2020-11-19 PROCEDURE — 82570 ASSAY OF URINE CREATININE: CPT | Performed by: INTERNAL MEDICINE

## 2020-11-19 PROCEDURE — 85025 COMPLETE CBC W/AUTO DIFF WBC: CPT

## 2020-11-19 PROCEDURE — 86803 HEPATITIS C AB TEST: CPT

## 2020-11-19 PROCEDURE — 80053 COMPREHEN METABOLIC PANEL: CPT

## 2020-11-19 PROCEDURE — 36415 COLL VENOUS BLD VENIPUNCTURE: CPT

## 2020-11-19 PROCEDURE — 86140 C-REACTIVE PROTEIN: CPT

## 2020-11-19 PROCEDURE — 82043 UR ALBUMIN QUANTITATIVE: CPT | Performed by: INTERNAL MEDICINE

## 2020-11-19 PROCEDURE — 85652 RBC SED RATE AUTOMATED: CPT | Performed by: INTERNAL MEDICINE

## 2020-11-24 LAB — HLA-B27 QL NAA+PROBE: NEGATIVE

## 2020-12-23 ENCOUNTER — TELEPHONE (OUTPATIENT)
Dept: INTERNAL MEDICINE CLINIC | Facility: CLINIC | Age: 45
End: 2020-12-23

## 2020-12-23 DIAGNOSIS — M79.7 FIBROMYALGIA: ICD-10-CM

## 2020-12-23 RX ORDER — DULOXETIN HYDROCHLORIDE 60 MG/1
60 CAPSULE, DELAYED RELEASE ORAL DAILY
Qty: 30 CAPSULE | Refills: 0 | Status: SHIPPED | OUTPATIENT
Start: 2020-12-23 | End: 2020-12-29 | Stop reason: SDUPTHER

## 2020-12-29 ENCOUNTER — TRANSCRIBE ORDERS (OUTPATIENT)
Dept: LAB | Facility: CLINIC | Age: 45
End: 2020-12-29

## 2020-12-29 ENCOUNTER — APPOINTMENT (OUTPATIENT)
Dept: LAB | Facility: CLINIC | Age: 45
End: 2020-12-29
Payer: COMMERCIAL

## 2020-12-29 ENCOUNTER — OFFICE VISIT (OUTPATIENT)
Dept: INTERNAL MEDICINE CLINIC | Facility: CLINIC | Age: 45
End: 2020-12-29
Payer: COMMERCIAL

## 2020-12-29 VITALS
OXYGEN SATURATION: 94 % | WEIGHT: 257.2 LBS | BODY MASS INDEX: 42.8 KG/M2 | TEMPERATURE: 98.1 F | SYSTOLIC BLOOD PRESSURE: 132 MMHG | HEART RATE: 92 BPM | DIASTOLIC BLOOD PRESSURE: 76 MMHG

## 2020-12-29 DIAGNOSIS — E78.00 PURE HYPERCHOLESTEROLEMIA: ICD-10-CM

## 2020-12-29 DIAGNOSIS — T78.40XA ALLERGY, INITIAL ENCOUNTER: ICD-10-CM

## 2020-12-29 DIAGNOSIS — M79.7 FIBROMYALGIA: ICD-10-CM

## 2020-12-29 DIAGNOSIS — D50.0 IRON DEFICIENCY ANEMIA SECONDARY TO BLOOD LOSS (CHRONIC): Primary | ICD-10-CM

## 2020-12-29 DIAGNOSIS — Z11.4 ENCOUNTER FOR SCREENING FOR HIV: ICD-10-CM

## 2020-12-29 DIAGNOSIS — Z00.00 ROUTINE PHYSICAL EXAMINATION: Primary | ICD-10-CM

## 2020-12-29 DIAGNOSIS — E11.9 TYPE 2 DIABETES MELLITUS WITHOUT COMPLICATION, WITHOUT LONG-TERM CURRENT USE OF INSULIN (HCC): ICD-10-CM

## 2020-12-29 DIAGNOSIS — K58.0 IRRITABLE BOWEL SYNDROME WITH DIARRHEA: ICD-10-CM

## 2020-12-29 DIAGNOSIS — D50.0 IRON DEFICIENCY ANEMIA SECONDARY TO BLOOD LOSS (CHRONIC): ICD-10-CM

## 2020-12-29 LAB
BASOPHILS # BLD AUTO: 0.07 THOUSANDS/ΜL (ref 0–0.1)
BASOPHILS NFR BLD AUTO: 1 % (ref 0–1)
CHOLEST SERPL-MCNC: 202 MG/DL (ref 50–200)
EOSINOPHIL # BLD AUTO: 0.23 THOUSAND/ΜL (ref 0–0.61)
EOSINOPHIL NFR BLD AUTO: 2 % (ref 0–6)
ERYTHROCYTE [DISTWIDTH] IN BLOOD BY AUTOMATED COUNT: 17.4 % (ref 11.6–15.1)
EST. AVERAGE GLUCOSE BLD GHB EST-MCNC: 143 MG/DL
FERRITIN SERPL-MCNC: 8 NG/ML (ref 8–388)
HBA1C MFR BLD: 6.6 %
HCT VFR BLD AUTO: 37.4 % (ref 34.8–46.1)
HDLC SERPL-MCNC: 36 MG/DL
HGB BLD-MCNC: 11 G/DL (ref 11.5–15.4)
IMM GRANULOCYTES # BLD AUTO: 0.1 THOUSAND/UL (ref 0–0.2)
IMM GRANULOCYTES NFR BLD AUTO: 1 % (ref 0–2)
IRON SATN MFR SERPL: 4 %
IRON SERPL-MCNC: 18 UG/DL (ref 50–170)
LDLC SERPL CALC-MCNC: 119 MG/DL (ref 0–100)
LYMPHOCYTES # BLD AUTO: 2.24 THOUSANDS/ΜL (ref 0.6–4.47)
LYMPHOCYTES NFR BLD AUTO: 19 % (ref 14–44)
MCH RBC QN AUTO: 23.3 PG (ref 26.8–34.3)
MCHC RBC AUTO-ENTMCNC: 29.4 G/DL (ref 31.4–37.4)
MCV RBC AUTO: 79 FL (ref 82–98)
MONOCYTES # BLD AUTO: 0.8 THOUSAND/ΜL (ref 0.17–1.22)
MONOCYTES NFR BLD AUTO: 7 % (ref 4–12)
NEUTROPHILS # BLD AUTO: 8.34 THOUSANDS/ΜL (ref 1.85–7.62)
NEUTS SEG NFR BLD AUTO: 70 % (ref 43–75)
NONHDLC SERPL-MCNC: 166 MG/DL
NRBC BLD AUTO-RTO: 0 /100 WBCS
PLATELET # BLD AUTO: 468 THOUSANDS/UL (ref 149–390)
PMV BLD AUTO: 9.8 FL (ref 8.9–12.7)
RBC # BLD AUTO: 4.73 MILLION/UL (ref 3.81–5.12)
TIBC SERPL-MCNC: 434 UG/DL (ref 250–450)
TRIGL SERPL-MCNC: 234 MG/DL
WBC # BLD AUTO: 11.78 THOUSAND/UL (ref 4.31–10.16)

## 2020-12-29 PROCEDURE — 87389 HIV-1 AG W/HIV-1&-2 AB AG IA: CPT

## 2020-12-29 PROCEDURE — 82728 ASSAY OF FERRITIN: CPT

## 2020-12-29 PROCEDURE — 83036 HEMOGLOBIN GLYCOSYLATED A1C: CPT

## 2020-12-29 PROCEDURE — 3044F HG A1C LEVEL LT 7.0%: CPT | Performed by: NURSE PRACTITIONER

## 2020-12-29 PROCEDURE — 83540 ASSAY OF IRON: CPT

## 2020-12-29 PROCEDURE — 83550 IRON BINDING TEST: CPT

## 2020-12-29 PROCEDURE — 80061 LIPID PANEL: CPT

## 2020-12-29 PROCEDURE — 99214 OFFICE O/P EST MOD 30 MIN: CPT | Performed by: NURSE PRACTITIONER

## 2020-12-29 PROCEDURE — 36415 COLL VENOUS BLD VENIPUNCTURE: CPT

## 2020-12-29 PROCEDURE — 85025 COMPLETE CBC W/AUTO DIFF WBC: CPT

## 2020-12-29 PROCEDURE — 3725F SCREEN DEPRESSION PERFORMED: CPT | Performed by: NURSE PRACTITIONER

## 2020-12-29 RX ORDER — MONTELUKAST SODIUM 10 MG/1
10 TABLET ORAL
Qty: 30 EACH | Refills: 3 | Status: SHIPPED | OUTPATIENT
Start: 2020-12-29 | End: 2021-05-05

## 2020-12-29 RX ORDER — DULOXETIN HYDROCHLORIDE 60 MG/1
60 CAPSULE, DELAYED RELEASE ORAL DAILY
Qty: 30 EACH | Refills: 3 | Status: SHIPPED | OUTPATIENT
Start: 2020-12-29 | End: 2021-06-09

## 2020-12-29 RX ORDER — PREGABALIN 50 MG/1
50 CAPSULE ORAL 2 TIMES DAILY
COMMUNITY
End: 2021-06-28

## 2020-12-29 RX ORDER — ERGOCALCIFEROL 1.25 MG/1
CAPSULE ORAL
COMMUNITY
Start: 2020-12-27 | End: 2022-06-06 | Stop reason: ALTCHOICE

## 2020-12-29 RX ORDER — MELOXICAM 15 MG/1
15 TABLET ORAL DAILY
COMMUNITY
Start: 2020-12-15 | End: 2022-04-26 | Stop reason: ALTCHOICE

## 2020-12-30 ENCOUNTER — TELEPHONE (OUTPATIENT)
Dept: INTERNAL MEDICINE CLINIC | Facility: CLINIC | Age: 45
End: 2020-12-30

## 2020-12-30 LAB — HIV 1+2 AB+HIV1 P24 AG SERPL QL IA: NORMAL

## 2021-03-04 ENCOUNTER — LAB (OUTPATIENT)
Dept: LAB | Facility: CLINIC | Age: 46
End: 2021-03-04
Payer: COMMERCIAL

## 2021-03-04 ENCOUNTER — TRANSCRIBE ORDERS (OUTPATIENT)
Dept: LAB | Facility: CLINIC | Age: 46
End: 2021-03-04

## 2021-03-04 DIAGNOSIS — D50.0 IRON DEFICIENCY ANEMIA SECONDARY TO BLOOD LOSS (CHRONIC): ICD-10-CM

## 2021-03-04 DIAGNOSIS — D50.0 IRON DEFICIENCY ANEMIA SECONDARY TO BLOOD LOSS (CHRONIC): Primary | ICD-10-CM

## 2021-03-04 DIAGNOSIS — E55.9 VITAMIN D DEFICIENCY: ICD-10-CM

## 2021-03-04 LAB
25(OH)D3 SERPL-MCNC: 28.6 NG/ML (ref 30–100)
BASOPHILS # BLD AUTO: 0.04 THOUSANDS/ΜL (ref 0–0.1)
BASOPHILS NFR BLD AUTO: 0 % (ref 0–1)
EOSINOPHIL # BLD AUTO: 0.19 THOUSAND/ΜL (ref 0–0.61)
EOSINOPHIL NFR BLD AUTO: 1 % (ref 0–6)
ERYTHROCYTE [DISTWIDTH] IN BLOOD BY AUTOMATED COUNT: 18.2 % (ref 11.6–15.1)
ERYTHROCYTE [SEDIMENTATION RATE] IN BLOOD: 60 MM/HOUR (ref 0–19)
HCT VFR BLD AUTO: 38.6 % (ref 34.8–46.1)
HGB BLD-MCNC: 11.7 G/DL (ref 11.5–15.4)
IMM GRANULOCYTES # BLD AUTO: 0.12 THOUSAND/UL (ref 0–0.2)
IMM GRANULOCYTES NFR BLD AUTO: 1 % (ref 0–2)
LYMPHOCYTES # BLD AUTO: 2.05 THOUSANDS/ΜL (ref 0.6–4.47)
LYMPHOCYTES NFR BLD AUTO: 15 % (ref 14–44)
MCH RBC QN AUTO: 25.1 PG (ref 26.8–34.3)
MCHC RBC AUTO-ENTMCNC: 30.3 G/DL (ref 31.4–37.4)
MCV RBC AUTO: 83 FL (ref 82–98)
MONOCYTES # BLD AUTO: 0.76 THOUSAND/ΜL (ref 0.17–1.22)
MONOCYTES NFR BLD AUTO: 6 % (ref 4–12)
NEUTROPHILS # BLD AUTO: 10.19 THOUSANDS/ΜL (ref 1.85–7.62)
NEUTS SEG NFR BLD AUTO: 77 % (ref 43–75)
NRBC BLD AUTO-RTO: 0 /100 WBCS
PLATELET # BLD AUTO: 390 THOUSANDS/UL (ref 149–390)
PMV BLD AUTO: 9.7 FL (ref 8.9–12.7)
RBC # BLD AUTO: 4.67 MILLION/UL (ref 3.81–5.12)
WBC # BLD AUTO: 13.35 THOUSAND/UL (ref 4.31–10.16)

## 2021-03-04 PROCEDURE — 85025 COMPLETE CBC W/AUTO DIFF WBC: CPT

## 2021-03-04 PROCEDURE — 36415 COLL VENOUS BLD VENIPUNCTURE: CPT | Performed by: INTERNAL MEDICINE

## 2021-03-04 PROCEDURE — 85652 RBC SED RATE AUTOMATED: CPT | Performed by: INTERNAL MEDICINE

## 2021-03-04 PROCEDURE — 82306 VITAMIN D 25 HYDROXY: CPT

## 2021-03-16 ENCOUNTER — TELEPHONE (OUTPATIENT)
Dept: OBGYN CLINIC | Facility: CLINIC | Age: 46
End: 2021-03-16

## 2021-03-16 ENCOUNTER — OFFICE VISIT (OUTPATIENT)
Dept: OBGYN CLINIC | Facility: CLINIC | Age: 46
End: 2021-03-16
Payer: COMMERCIAL

## 2021-03-16 VITALS
WEIGHT: 265 LBS | DIASTOLIC BLOOD PRESSURE: 70 MMHG | SYSTOLIC BLOOD PRESSURE: 118 MMHG | HEIGHT: 66 IN | BODY MASS INDEX: 42.59 KG/M2

## 2021-03-16 DIAGNOSIS — N92.0 MENORRHAGIA WITH REGULAR CYCLE: Primary | ICD-10-CM

## 2021-03-16 PROCEDURE — 99213 OFFICE O/P EST LOW 20 MIN: CPT | Performed by: NURSE PRACTITIONER

## 2021-03-16 PROCEDURE — 3008F BODY MASS INDEX DOCD: CPT | Performed by: NURSE PRACTITIONER

## 2021-03-16 NOTE — TELEPHONE ENCOUNTER
Pt seen here last 1/2019 for repeat pap smear - last yearly 6/2018 - pt having issue with menorrhagia, states it has been getting worse past several yrs  LMP 3/13/2021, heavy flow changing q 1 1/2 - 2 hrs, passing clots  Will see office for problem appt - pt aware needs to schedule yearly appt  There is a recent CBC in chart from 3/4/2021 - hgb/hct = 11 7/38  6  Pt also states she feels fatigued & nausea premenstrually  Hx TL  Last pap 1/2019 - wnl, (+) trich,  (+) HPV type 18 & (+) other high risk types  She was prev recom too try Lysteda @ appt 6/2018

## 2021-03-16 NOTE — PROGRESS NOTES
Jeffery Bauman 80-year-old presents  with 2  and 2 C/S  She presents with complaint of heavy bleeding with regular cycles  This has been ongoing now for about 6 years  In 2018 Dr Pilo Barrera prescribed Lysteda  She thinks she tried it but can't recall  This current cycle started on Saturday  States generally day 3 is the heaviest but today is day 4 and it is still heavy and she passed a golf ball clot  Her flow last for 7 days  Patient's last menstrual period was 2021  ROS:  As indicated in HPI  All other ROS negative  Physical Exam  Constitutional:       Appearance: Normal appearance  Genitourinary:      Pelvic exam was performed with patient in the lithotomy position  Vulva, vagina, cervix and uterus normal       Uterus is anteverted  Genitourinary Comments: There is moderate amount of blood in the vaginal vault   HENT:      Head: Normocephalic and atraumatic  Neck:      Musculoskeletal: Neck supple  Neurological:      Mental Status: She is alert  Skin:     General: Skin is warm  Psychiatric:         Behavior: Behavior is cooperative  Vitals signs and nursing note reviewed  Candi Copeland was seen today for menstrual problem  Diagnoses and all orders for this visit:    Menorrhagia with regular cycle  -     US pelvis complete non OB; Future    Will obtained pelvic US to evaluate further and r/o other etiologies  Discussed the option a Mirena IUD for cycle control  Explained the progesterone hormone will cause the endometrial lining to become thin therefore causing either no menses or a light menses monthly  Patient is interested in this option  We will process through insurance and get back to her

## 2021-03-16 NOTE — Clinical Note
Nam More can you process a Mirena for this patient  She has a tubal ligation and will be using it for her menorrhagia

## 2021-03-23 ENCOUNTER — HOSPITAL ENCOUNTER (OUTPATIENT)
Dept: ULTRASOUND IMAGING | Facility: HOSPITAL | Age: 46
Discharge: HOME | End: 2021-03-23
Payer: COMMERCIAL

## 2021-03-23 DIAGNOSIS — N92.0 MENORRHAGIA WITH REGULAR CYCLE: ICD-10-CM

## 2021-03-23 PROCEDURE — 76830 TRANSVAGINAL US NON-OB: CPT

## 2021-03-23 PROCEDURE — 76856 US EXAM PELVIC COMPLETE: CPT

## 2021-03-25 ENCOUNTER — TELEPHONE (OUTPATIENT)
Dept: OBGYN CLINIC | Facility: CLINIC | Age: 46
End: 2021-03-25

## 2021-03-25 NOTE — TELEPHONE ENCOUNTER
Patient informed cause IUD is not being used for contraception reasons  The IUD will go towards deductible first and then 80% afterwards    Per [facility] Ref # [id number]

## 2021-04-02 ENCOUNTER — ANNUAL EXAM (OUTPATIENT)
Dept: OBGYN CLINIC | Facility: CLINIC | Age: 46
End: 2021-04-02
Payer: COMMERCIAL

## 2021-04-02 VITALS — SYSTOLIC BLOOD PRESSURE: 144 MMHG | DIASTOLIC BLOOD PRESSURE: 82 MMHG | WEIGHT: 266 LBS | BODY MASS INDEX: 42.93 KG/M2

## 2021-04-02 DIAGNOSIS — B37.3 VAGINAL CANDIDA: ICD-10-CM

## 2021-04-02 DIAGNOSIS — N92.0 MENORRHAGIA WITH REGULAR CYCLE: ICD-10-CM

## 2021-04-02 DIAGNOSIS — Z12.31 ENCOUNTER FOR SCREENING MAMMOGRAM FOR BREAST CANCER: ICD-10-CM

## 2021-04-02 DIAGNOSIS — Z01.419 WOMEN'S ANNUAL ROUTINE GYNECOLOGICAL EXAMINATION: Primary | ICD-10-CM

## 2021-04-02 DIAGNOSIS — D50.0 ANEMIA DUE TO BLOOD LOSS: ICD-10-CM

## 2021-04-02 PROCEDURE — S0612 ANNUAL GYNECOLOGICAL EXAMINA: HCPCS | Performed by: NURSE PRACTITIONER

## 2021-04-02 PROCEDURE — 87624 HPV HI-RISK TYP POOLED RSLT: CPT | Performed by: NURSE PRACTITIONER

## 2021-04-02 PROCEDURE — G0145 SCR C/V CYTO,THINLAYER,RESCR: HCPCS | Performed by: NURSE PRACTITIONER

## 2021-04-02 RX ORDER — FLUCONAZOLE 100 MG/1
100 TABLET ORAL DAILY
Qty: 2 TABLET | Refills: 0 | Status: SHIPPED | OUTPATIENT
Start: 2021-04-02 | End: 2021-04-04

## 2021-04-02 RX ORDER — PREDNISONE 1 MG/1
TABLET ORAL
COMMUNITY
Start: 2021-03-10 | End: 2022-02-25

## 2021-04-02 NOTE — PROGRESS NOTES
Subjective    HPI:     Isaiah Sanders is a 41 y o  female  She is a  3 Para 3, with C/S x 2 and 1   Her menstrual cycles are monthly, very heavy and painful  She has anemia related to her heavy menses  We had discussed the option of the Mirena IUD for cycle control but her insurance will not cover it as it is not used for contraception  She has a history of a tubal ligation  She states after intercourse she gets little fissures that hurt  She does admit to vaginal dryness  Discussed using adequate lubrcation to help with preventing the fissures  She has some stress incontinence with sneezing and jumping  She has history of IBS, ulcerative colitis, hiatal hernia and gastritis  She feels safe at home  She states her depression is controlled with Cymbalta  Medical, surgical and family history reviewed  Her dental care is not done- last done 2018  Discussed the importance of routine dental cleanings as this is also heart health  Gynecologic History    Patient's last menstrual period was 2021  Last Pap: 2019  Results were: abnormal - normal cells with + HPV  Last mammogram: needs baseline mammogram      Obstetric History    OB History    Para Term  AB Living   3 3 3     3   SAB TAB Ectopic Multiple Live Births           3      # Outcome Date GA Lbr Landen/2nd Weight Sex Delivery Anes PTL Lv   3 Term     F CS-LTranv   AISLINN   2 Term     M CS-LTranv   AISLINN   1 Term     M Vag-Spont   AISLINN       The following portions of the patient's history were reviewed and updated as appropriate: allergies, current medications, past family history, past medical history, past social history, past surgical history and problem list     Review of Systems    Pertinent items are noted in HPI  Objective    Physical Exam  Constitutional:       Appearance: Normal appearance  She is well-developed  Genitourinary:      Pelvic exam was performed with patient in the lithotomy position  Vulva, inguinal canal, urethra, bladder, uterus, right adnexa and left adnexa normal       No posterior fourchette tenderness, injury, rash or lesion present  Vaginal discharge (curd appearing discharge consistent with candida) present  Cervix is parous  No cervical motion tenderness, discharge, friability, lesion, erythema, bleeding, polyp or nabothian cyst       Uterus is anteverted  No right or left adnexal mass present  Right adnexa not tender or full  Left adnexa not tender or full  HENT:      Head: Normocephalic and atraumatic  Neck:      Musculoskeletal: Neck supple  Thyroid: No thyromegaly  Cardiovascular:      Rate and Rhythm: Normal rate and regular rhythm  Heart sounds: Normal heart sounds, S1 normal and S2 normal    Pulmonary:      Effort: Pulmonary effort is normal       Breath sounds: Normal breath sounds  Chest:      Breasts: Breasts are symmetrical          Right: Normal  No inverted nipple, mass, nipple discharge, skin change or tenderness  Left: Normal  No inverted nipple, mass, nipple discharge, skin change or tenderness  Abdominal:      General: Bowel sounds are normal  There is no distension  Palpations: Abdomen is soft  There is no mass  Tenderness: There is no abdominal tenderness  There is no guarding  Lymphadenopathy:      Cervical: No cervical adenopathy  Upper Body:      Right upper body: No supraclavicular or axillary adenopathy  Left upper body: No supraclavicular or axillary adenopathy  Neurological:      Mental Status: She is alert  Skin:     General: Skin is warm and dry  Findings: No rash  Psychiatric:         Attention and Perception: Attention and perception normal          Mood and Affect: Mood and affect normal          Speech: Speech normal          Behavior: Behavior is cooperative  Thought Content:  Thought content normal          Cognition and Memory: Cognition and memory normal  Judgment: Judgment normal    Vitals signs and nursing note reviewed  Assessment and Plan    Lynn was seen today for gynecologic exam     Diagnoses and all orders for this visit:    Women's annual routine gynecological examination  -     Liquid-based pap, screening    Encounter for screening mammogram for breast cancer  -     Mammo screening bilateral w 3d & cad; Future    Menorrhagia with regular cycle  -     Ambulatory referral to Gynecology; Future    Vaginal candida  -     fluconazole (DIFLUCAN) 100 mg tablet; Take 1 tablet (100 mg total) by mouth daily for 2 days    Anemia due to blood loss  -     Ambulatory referral to Gynecology; Future      Patient informed of a Stable GYN exam  A pap smear was performed  I have discussed the importance of exercise and healthy diet as well as adequate intake of calcium and vitamin D  The current ASCCP guidelines were reviewed  The low risk patient will receive pap smear screening every 3 years until the age of 34 and then every 3 to 5 years with HPV co-testing from the ages of 33-67  I emphasized the importance of an annual pelvic and breast exam  A yearly mammogram is recommended for breast cancer screening starting at age 36  She has not had a baseline  Education on the importance of yearly screening  A script was provided today  I also discussed the option of a consult with Dr Apple Howell for a endometrial ablation which will help with her heavy menses  Patient is interested in a referral      All questions have been answered to her satisfaction  Mammogram ordered  Follow up in: 6 months

## 2021-04-06 LAB
HPV HR 12 DNA CVX QL NAA+PROBE: POSITIVE
HPV16 DNA CVX QL NAA+PROBE: NEGATIVE
HPV18 DNA CVX QL NAA+PROBE: NEGATIVE

## 2021-04-10 LAB
LAB AP GYN PRIMARY INTERPRETATION: NORMAL
Lab: NORMAL
PATH INTERP SPEC-IMP: NORMAL

## 2021-04-13 ENCOUNTER — TELEPHONE (OUTPATIENT)
Dept: OBGYN CLINIC | Facility: CLINIC | Age: 46
End: 2021-04-13

## 2021-05-05 DIAGNOSIS — T78.40XA ALLERGY, INITIAL ENCOUNTER: ICD-10-CM

## 2021-05-05 RX ORDER — MONTELUKAST SODIUM 10 MG/1
TABLET ORAL
Qty: 30 TABLET | Refills: 3 | Status: SHIPPED | OUTPATIENT
Start: 2021-05-05 | End: 2021-08-18

## 2021-05-27 DIAGNOSIS — M79.7 FIBROMYALGIA: ICD-10-CM

## 2021-06-09 ENCOUNTER — TELEPHONE (OUTPATIENT)
Dept: INTERNAL MEDICINE CLINIC | Facility: CLINIC | Age: 46
End: 2021-06-09

## 2021-06-09 DIAGNOSIS — M79.7 FIBROMYALGIA: ICD-10-CM

## 2021-06-09 RX ORDER — DULOXETIN HYDROCHLORIDE 60 MG/1
60 CAPSULE, DELAYED RELEASE ORAL DAILY
Refills: 0 | Status: CANCELLED | OUTPATIENT
Start: 2021-06-09

## 2021-06-09 RX ORDER — DULOXETIN HYDROCHLORIDE 60 MG/1
CAPSULE, DELAYED RELEASE ORAL
Qty: 90 CAPSULE | Refills: 1 | Status: SHIPPED | OUTPATIENT
Start: 2021-06-09 | End: 2021-09-20

## 2021-06-09 NOTE — TELEPHONE ENCOUNTER
DULoxetine (CYMBALTA) 60 mg delayed release capsule    Pt said this has been requested 3 x's by her pharmacy    she has been out for about a week & is not feeling well    dizzy spells, upset stomach    she would like to get it asap    please order today      [facility]  [phone]

## 2021-06-28 ENCOUNTER — APPOINTMENT (OUTPATIENT)
Dept: LAB | Facility: CLINIC | Age: 46
End: 2021-06-28
Payer: COMMERCIAL

## 2021-06-28 ENCOUNTER — OFFICE VISIT (OUTPATIENT)
Dept: INTERNAL MEDICINE CLINIC | Facility: CLINIC | Age: 46
End: 2021-06-28
Payer: COMMERCIAL

## 2021-06-28 VITALS
SYSTOLIC BLOOD PRESSURE: 120 MMHG | DIASTOLIC BLOOD PRESSURE: 84 MMHG | TEMPERATURE: 97.9 F | BODY MASS INDEX: 41.3 KG/M2 | OXYGEN SATURATION: 98 % | HEIGHT: 66 IN | HEART RATE: 97 BPM | WEIGHT: 257 LBS

## 2021-06-28 DIAGNOSIS — M79.7 FIBROMYALGIA: Primary | ICD-10-CM

## 2021-06-28 DIAGNOSIS — E78.00 PURE HYPERCHOLESTEROLEMIA: ICD-10-CM

## 2021-06-28 DIAGNOSIS — Z20.822 CLOSE EXPOSURE TO COVID-19 VIRUS: ICD-10-CM

## 2021-06-28 DIAGNOSIS — K29.60 EROSIVE GASTRITIS: ICD-10-CM

## 2021-06-28 DIAGNOSIS — R53.82 CHRONIC FATIGUE: ICD-10-CM

## 2021-06-28 DIAGNOSIS — E11.9 TYPE 2 DIABETES MELLITUS WITHOUT COMPLICATION, WITHOUT LONG-TERM CURRENT USE OF INSULIN (HCC): ICD-10-CM

## 2021-06-28 DIAGNOSIS — Z13.29 SCREENING FOR THYROID DISORDER: ICD-10-CM

## 2021-06-28 LAB
ALBUMIN SERPL BCP-MCNC: 3.6 G/DL (ref 3.5–5)
ALP SERPL-CCNC: 109 U/L (ref 46–116)
ALT SERPL W P-5'-P-CCNC: 40 U/L (ref 12–78)
ANION GAP SERPL CALCULATED.3IONS-SCNC: 8 MMOL/L (ref 4–13)
AST SERPL W P-5'-P-CCNC: 24 U/L (ref 5–45)
BILIRUB SERPL-MCNC: 0.66 MG/DL (ref 0.2–1)
BUN SERPL-MCNC: 7 MG/DL (ref 5–25)
CALCIUM SERPL-MCNC: 9.5 MG/DL (ref 8.3–10.1)
CHLORIDE SERPL-SCNC: 104 MMOL/L (ref 100–108)
CHOLEST SERPL-MCNC: 231 MG/DL (ref 50–200)
CO2 SERPL-SCNC: 22 MMOL/L (ref 21–32)
CREAT SERPL-MCNC: 0.8 MG/DL (ref 0.6–1.3)
EST. AVERAGE GLUCOSE BLD GHB EST-MCNC: 143 MG/DL
GFR SERPL CREATININE-BSD FRML MDRD: 89 ML/MIN/1.73SQ M
GLUCOSE P FAST SERPL-MCNC: 141 MG/DL (ref 65–99)
HBA1C MFR BLD: 6.6 %
HDLC SERPL-MCNC: 33 MG/DL
LDLC SERPL CALC-MCNC: 160 MG/DL (ref 0–100)
NONHDLC SERPL-MCNC: 198 MG/DL
POTASSIUM SERPL-SCNC: 4 MMOL/L (ref 3.5–5.3)
PROT SERPL-MCNC: 8.1 G/DL (ref 6.4–8.2)
SARS-COV-2 IGG+IGM SERPL QL IA: NORMAL
SODIUM SERPL-SCNC: 134 MMOL/L (ref 136–145)
TRIGL SERPL-MCNC: 191 MG/DL
TSH SERPL DL<=0.05 MIU/L-ACNC: 1.72 UIU/ML (ref 0.36–3.74)

## 2021-06-28 PROCEDURE — 36415 COLL VENOUS BLD VENIPUNCTURE: CPT

## 2021-06-28 PROCEDURE — 86769 SARS-COV-2 COVID-19 ANTIBODY: CPT

## 2021-06-28 PROCEDURE — 99214 OFFICE O/P EST MOD 30 MIN: CPT | Performed by: NURSE PRACTITIONER

## 2021-06-28 PROCEDURE — 3008F BODY MASS INDEX DOCD: CPT | Performed by: NURSE PRACTITIONER

## 2021-06-28 PROCEDURE — 83036 HEMOGLOBIN GLYCOSYLATED A1C: CPT

## 2021-06-28 PROCEDURE — 3044F HG A1C LEVEL LT 7.0%: CPT | Performed by: NURSE PRACTITIONER

## 2021-06-28 PROCEDURE — 80053 COMPREHEN METABOLIC PANEL: CPT

## 2021-06-28 PROCEDURE — 84443 ASSAY THYROID STIM HORMONE: CPT

## 2021-06-28 PROCEDURE — 80061 LIPID PANEL: CPT

## 2021-06-28 RX ORDER — GABAPENTIN 100 MG/1
100 CAPSULE ORAL 3 TIMES DAILY
Qty: 90 CAPSULE | Refills: 2 | Status: SHIPPED | OUTPATIENT
Start: 2021-06-28 | End: 2021-09-24

## 2021-06-28 RX ORDER — PANTOPRAZOLE SODIUM 40 MG/1
40 TABLET, DELAYED RELEASE ORAL DAILY
Qty: 90 TABLET | Refills: 3 | Status: SHIPPED | OUTPATIENT
Start: 2021-06-28 | End: 2022-06-16

## 2021-06-28 NOTE — PATIENT INSTRUCTIONS
Try taking Melatonin at night  Start with 5 mg nightly  Can decrease or increase up to 10 mg a night if needed  Insomnia   AMBULATORY CARE:   Insomnia  is a condition that makes it hard to fall or stay asleep  Lack of sleep can lead to attention or memory problems during the day  You may also be tinoco, depressed, clumsy, or have headaches  Contact your healthcare provider if:   · Your symptoms do not get better, or they get worse  · You begin to use drugs or alcohol to fall asleep  · You have questions or concerns about your condition or care  Medicines:   · Medicines  may help you sleep more regularly or help you feel less anxious  · Take your medicine as directed  Contact your healthcare provider if you think your medicine is not helping or if you have side effects  Tell him or her if you are allergic to any medicine  Keep a list of the medicines, vitamins, and herbs you take  Include the amounts, and when and why you take them  Bring the list or the pill bottles to follow-up visits  Carry your medicine list with you in case of an emergency  What you can do to improve your sleep:   · Create a sleep schedule  Try to go to sleep and wake up at the same times every day  Keep a record of your sleep patterns, and any sleeping problems you have  Bring the record to follow-up visits with healthcare providers  · Do not take naps  Naps could make it hard for you to fall asleep at bedtime  · Keep your bedroom cool, quiet, and dark  Turn on white noise, such as a fan, to help you relax  Do not use your bed for any activity that will keep you awake  Do not read, exercise, eat, or watch TV in your bedroom  · Get up if you do not fall asleep within 20 minutes  Move to another room and do something relaxing until you become sleepy  · Limit caffeine, alcohol, and food to earlier in the day  Only drink caffeine in the morning  Do not drink alcohol within 6 hours of bedtime   Do not eat a heavy meal right before you go to bed  · Exercise regularly  Daily exercise may help you sleep better  Do not exercise within 4 hours of bedtime  Follow up with your healthcare provider as directed: Your healthcare provider may refer you for cognitive behavioral therapy  A behavioral therapist may help you find ways to relax, decrease stress, and improve sleep  Write down your questions so you remember to ask them during your visits  © Copyright [address] Information is for End User's use only and may not be sold, redistributed or otherwise used for commercial purposes  All illustrations and images included in CareNotes® are the copyrighted property of [employer]  or [address] [place] [address]  The above information is an  only  It is not intended as medical advice for individual conditions or treatments  Talk to your doctor, nurse or pharmacist before following any medical regimen to see if it is safe and effective for you

## 2021-06-28 NOTE — PROGRESS NOTES
INTERNAL MEDICINE FOLLOW-UP OFFICE VISIT  [facility] - [facility]    NAME: William Rivera  AGE: 40 y o  SEX: female    DATE OF ENCOUNTER: 6/28/2021   Assessment and Plan:     Problem List Items Addressed This Visit        Endocrine    Type 2 diabetes mellitus without complication ([address] )     Will check utd A1C  Patient has lost 10 lbs since last documents weight  Lab Results   Component Value Date    HGBA1C 6 6 (H) 12/29/2020            Relevant Orders    HEMOGLOBIN A1C W/ EAG ESTIMATION    Comprehensive metabolic panel       Other    Fibromyalgia - Primary     Unable to take Lyrica since insurance issues  Will start gabapentin 100 mg tid and reassess in 2-3 months  Relevant Medications    gabapentin (NEURONTIN) 100 mg capsule    Chronic fatigue    Pure hypercholesterolemia    Relevant Orders    Lipid panel      Other Visit Diagnoses     Close exposure to COVID-19 virus        Relevant Orders    SARS CoV-2 ANTIBODY,IgG    Screening for thyroid disorder        Relevant Orders    TSH, 3rd generation with Free T4 reflex    Erosive gastritis        Relevant Medications    pantoprazole (PROTONIX) 40 mg tablet          Return in about 6 months (around 12/28/2021), or if symptoms worsen or fail to improve, for Next scheduled follow up  Counseling:     · Medication Side Effects - Adverse side effects of medications were reviewed with the patient/guardian today: Yes  · Counseling was given regarding: Prognosis, Risks and benefits of tx options, Intructions for management, Patient and family education, Importance of tx compliance, Risk factor reductions and Impressions  · Barriers to treatment include: No identified barriers      Chief Complaint:     Chief Complaint   Patient presents with    want to discuss covid shot        History of Present Illness:     Patient doing well  No changes in health since last visit  She does complain of seasonal insomnia  States she sleeps maybe 5 hours a night on a good night  Last night was awake every hour  Has tried [place] and Ambien in the past which were too strong  She has never taken melatonin supplements  Her youngest child is 10 and wants to be able to wake up if needed during the night without feeling sedated  Patient is having a lot of fibromyalgia and arthritis pains  States insurance will no longer pay for Lyrica  She has never been on gabapentin but is agreeable to try this  States she has good and bad days  On bad days it is difficult to get much done, although she pushes herself through  She is wondering if a handicap placard is available to her on the bad days  Also nervous about getting covid shot  States it is against Buddhism for her mother so she does not want to disappoint her mother  Her ex  wants the older children to be vaccinated but she is unsure of this as well  Has been doing research herself and asking friends for advice  The following portions of the patient's history were reviewed and updated as appropriate: allergies, current medications, past family history, past medical history, past social history, past surgical history and problem list      Review of Systems:     Review of Systems   Constitutional: Positive for fatigue  Negative for chills and fever  HENT: Negative for congestion, dental problem and sore throat  Eyes: Negative for visual disturbance  Respiratory: Negative for chest tightness and shortness of breath  Musculoskeletal: Positive for arthralgias and myalgias  Neurological: Negative for dizziness and headaches  Psychiatric/Behavioral: Positive for sleep disturbance  The patient is nervous/anxious           Problem List:     Patient Active Problem List   Diagnosis    Current moderate episode of major depressive disorder (HCC)    Irritable bowel syndrome    Fibromyalgia    Morbid obesity with BMI of 40 0-44 9, adult (HCC)    Chronic fatigue    Skin lesion    Prediabetes    Fatty liver    Arthralgia of multiple sites    Chronic bilateral low back pain with bilateral sciatica    Type 2 diabetes mellitus without complication (HCC)    Pure hypercholesterolemia        Objective:     /84 (BP Location: Left arm, Patient Position: Sitting) Comment: fds  Pulse 97   Temp 97 9 °F (36 6 °C) (Tympanic) Comment: gfd  Ht 5' 6" (1 676 m)   Wt 117 kg (257 lb)   SpO2 98%   BMI 41 48 kg/m²     Physical Exam  Vitals reviewed  Constitutional:       General: She is not in acute distress  Appearance: Normal appearance  She is well-developed and well-groomed  She is morbidly obese  She is not diaphoretic  HENT:      Head: Normocephalic and atraumatic  Right Ear: Hearing, tympanic membrane, ear canal and external ear normal       Left Ear: Hearing, tympanic membrane, ear canal and external ear normal       Nose: Nose normal  No mucosal edema or rhinorrhea  Mouth/Throat:      Dentition: No dental caries  Pharynx: Uvula midline  No oropharyngeal exudate  Eyes:      General: Lids are normal          Right eye: No discharge  Left eye: No discharge  Conjunctiva/sclera: Conjunctivae normal       Pupils: Pupils are equal, round, and reactive to light  Neck:      Thyroid: No thyromegaly  Trachea: Trachea and phonation normal  No tracheal deviation  Cardiovascular:      Rate and Rhythm: Normal rate and regular rhythm  Pulses: Normal pulses  Heart sounds: Normal heart sounds  No murmur heard  Pulmonary:      Effort: Pulmonary effort is normal  No respiratory distress  Breath sounds: Normal breath sounds  No wheezing  Abdominal:      General: Bowel sounds are normal  There is no distension  Palpations: Abdomen is soft  There is no hepatomegaly or splenomegaly  Tenderness: There is no abdominal tenderness  Musculoskeletal:         General: No tenderness  Normal range of motion        Cervical back: Normal range of motion and neck supple  Lymphadenopathy:      Cervical: No cervical adenopathy  Skin:     General: Skin is warm and dry  Capillary Refill: Capillary refill takes less than 2 seconds  Findings: No rash  Neurological:      Mental Status: She is alert and oriented to person, place, and time  Sensory: No sensory deficit  Psychiatric:         Attention and Perception: Attention normal          Mood and Affect: Mood normal          Speech: Speech normal          Behavior: Behavior normal  Behavior is cooperative  Thought Content: Thought content normal          Judgment: Judgment normal          Pertinent Laboratory/Diagnostic Studies:    Laboratory Results: I have personally reviewed the pertinent laboratory results/reports   Radiology/Other Diagnostic Testing Results: I have personally reviewed pertinent reports  Current Medications:     Current Outpatient Medications   Medication Sig Dispense Refill    acetaminophen (TYLENOL) 325 mg tablet Take 325 mg by mouth every 6 (six) hours as needed for mild pain        albuterol (PROVENTIL HFA,VENTOLIN HFA) 90 mcg/act inhaler Inhale 2 puffs every 6 (six) hours as needed for wheezing      Cetirizine HCl (ZYRTEC ALLERGY) 10 MG CAPS Take by mouth daily        dicyclomine (BENTYL) 10 mg capsule TAKE 1 CAPSULE BY MOUTH 3 TIMES DAILY   90 capsule 8    DULoxetine (CYMBALTA) 60 mg delayed release capsule TAKE 1 CAPSULE BY MOUTH EVERY DAY 90 capsule 1    ergocalciferol (VITAMIN D2) 50,000 units       fluticasone (FLONASE) 50 mcg/act nasal spray 1 spray into each nostril daily      meloxicam (MOBIC) 15 mg tablet Take 15 mg by mouth daily      montelukast (SINGULAIR) 10 mg tablet TAKE 1 TABLET BY MOUTH DAILY AT BEDTIME 30 tablet 3    olopatadine HCl (PATADAY) 0 2 % opth drops Apply 0 2 % to eye as needed        pantoprazole (PROTONIX) 40 mg tablet Take 1 tablet (40 mg total) by mouth daily 90 tablet 3    predniSONE 5 mg tablet Take as directed      gabapentin (NEURONTIN) 100 mg capsule Take 1 capsule (100 mg total) by mouth 3 (three) times a day Start taking twice a day for 4 days then increase to 3 times a day  90 capsule 2    valACYclovir (VALTREX) 1,000 mg tablet Take 1 tablet (1,000 mg total) by mouth 3 (three) times a day for 7 days (Patient not taking: Reported on 6/28/2021) 21 tablet 0     No current facility-administered medications for this visit  Patient Instructions   Try taking Melatonin at night  Start with 5 mg nightly  Can decrease or increase up to 10 mg a night if needed  Insomnia   AMBULATORY CARE:   Insomnia  is a condition that makes it hard to fall or stay asleep  Lack of sleep can lead to attention or memory problems during the day  You may also be tinoco, depressed, clumsy, or have headaches  Contact your healthcare provider if:   · Your symptoms do not get better, or they get worse  · You begin to use drugs or alcohol to fall asleep  · You have questions or concerns about your condition or care  Medicines:   · Medicines  may help you sleep more regularly or help you feel less anxious  · Take your medicine as directed  Contact your healthcare provider if you think your medicine is not helping or if you have side effects  Tell him or her if you are allergic to any medicine  Keep a list of the medicines, vitamins, and herbs you take  Include the amounts, and when and why you take them  Bring the list or the pill bottles to follow-up visits  Carry your medicine list with you in case of an emergency  What you can do to improve your sleep:   · Create a sleep schedule  Try to go to sleep and wake up at the same times every day  Keep a record of your sleep patterns, and any sleeping problems you have  Bring the record to follow-up visits with healthcare providers  · Do not take naps  Naps could make it hard for you to fall asleep at bedtime      · Keep your bedroom cool, quiet, and dark   Turn on white noise, such as a fan, to help you relax  Do not use your bed for any activity that will keep you awake  Do not read, exercise, eat, or watch TV in your bedroom  · Get up if you do not fall asleep within 20 minutes  Move to another room and do something relaxing until you become sleepy  · Limit caffeine, alcohol, and food to earlier in the day  Only drink caffeine in the morning  Do not drink alcohol within 6 hours of bedtime  Do not eat a heavy meal right before you go to bed  · Exercise regularly  Daily exercise may help you sleep better  Do not exercise within 4 hours of bedtime  Follow up with your healthcare provider as directed: Your healthcare provider may refer you for cognitive behavioral therapy  A behavioral therapist may help you find ways to relax, decrease stress, and improve sleep  Write down your questions so you remember to ask them during your visits  © Copyright [address] Information is for End User's use only and may not be sold, redistributed or otherwise used for commercial purposes  All illustrations and images included in CareNotes® are the copyrighted property of [employer]  or [facility] Kaden Carr   The above information is an  only  It is not intended as medical advice for individual conditions or treatments  Talk to your doctor, nurse or pharmacist before following any medical regimen to see if it is safe and effective for you          EVITA Hurt  [facility]

## 2021-06-28 NOTE — ASSESSMENT & PLAN NOTE
Unable to take Lyrica since insurance issues  Will start gabapentin 100 mg tid and reassess in 2-3 months

## 2021-06-28 NOTE — ASSESSMENT & PLAN NOTE
Will check utd A1C  Patient has lost 10 lbs since last documents weight       Lab Results   Component Value Date    HGBA1C 6 6 (H) 12/29/2020

## 2021-07-13 ENCOUNTER — VBI (OUTPATIENT)
Dept: ADMINISTRATIVE | Facility: OTHER | Age: 46
End: 2021-07-13

## 2021-08-18 DIAGNOSIS — T78.40XA ALLERGY, INITIAL ENCOUNTER: ICD-10-CM

## 2021-08-18 RX ORDER — MONTELUKAST SODIUM 10 MG/1
TABLET ORAL
Qty: 30 TABLET | Refills: 3 | Status: SHIPPED | OUTPATIENT
Start: 2021-08-18 | End: 2021-12-20

## 2021-09-03 ENCOUNTER — OFFICE VISIT (OUTPATIENT)
Dept: OBGYN CLINIC | Facility: CLINIC | Age: 46
End: 2021-09-03
Payer: COMMERCIAL

## 2021-09-03 VITALS
DIASTOLIC BLOOD PRESSURE: 80 MMHG | HEIGHT: 66 IN | BODY MASS INDEX: 40.82 KG/M2 | SYSTOLIC BLOOD PRESSURE: 124 MMHG | WEIGHT: 254 LBS

## 2021-09-03 DIAGNOSIS — N90.89 FISSURE OF VULVA: ICD-10-CM

## 2021-09-03 DIAGNOSIS — N76.0 ACUTE VAGINITIS: Primary | ICD-10-CM

## 2021-09-03 DIAGNOSIS — Z11.3 SCREENING EXAMINATION FOR STD (SEXUALLY TRANSMITTED DISEASE): ICD-10-CM

## 2021-09-03 PROCEDURE — 99213 OFFICE O/P EST LOW 20 MIN: CPT | Performed by: NURSE PRACTITIONER

## 2021-09-03 RX ORDER — LIDOCAINE 50 MG/G
OINTMENT TOPICAL AS NEEDED
Qty: 35.44 G | Refills: 0 | Status: SHIPPED | OUTPATIENT
Start: 2021-09-03 | End: 2022-02-25

## 2021-09-03 RX ORDER — FLUCONAZOLE 100 MG/1
100 TABLET ORAL DAILY
Qty: 2 TABLET | Refills: 0 | Status: SHIPPED | OUTPATIENT
Start: 2021-09-03 | End: 2021-09-05

## 2021-09-03 NOTE — PROGRESS NOTES
SUBJECTIVE:     40 y o  female complains of white and curd-like vaginal discharge for about 1 month month(s)  She had a cut above her clitoris 3-4 weeks which is   She complains of the external vagina irritated, raw and swollen  She has been using vagisil  Denies abnormal vaginal bleeding or significant pelvic pain or  fever  No UTI symptoms  Denies history of known exposure to STD  She desires STD screening  Patient's last menstrual period was 08/08/2021  OBJECTIVE:     She appears well, afebrile  Abdomen: benign, soft, nontender, no masses  Pelvic Exam: VULVA: there is fissure noted above the clitoris which is very tender, VAGINA: vaginal discharge - white, curd-like and odorless - consistent with candida  CERVIX: normal appearing cervix without discharge or lesions  ASSESSMENT AND PLAN:     Estella Romero was seen today for vaginitis  Diagnoses and all orders for this visit:    Acute vaginitis  -     fluconazole (DIFLUCAN) 100 mg tablet; Take 1 tablet (100 mg total) by mouth daily for 2 days  -     Sureswab(R), Vaginosis/Vaginitis Plus    Fissure of vulva  -     lidocaine (XYLOCAINE) 5 % ointment; Apply topically as needed for mild pain    Screening examination for STD (sexually transmitted disease)  -     Sureswab(R), Vaginosis/Vaginitis Plus      Will call patient with results and if additional treatment is needed

## 2021-09-07 LAB
A VAGINAE DNA VAG NAA+PROBE-LOG#: 7 LOG (CELLS/ML)
C GLABRATA DNA VAG QL NAA+PROBE: NOT DETECTED
C TRACH RRNA SPEC QL NAA+PROBE: NOT DETECTED
CANDIDA DNA VAG QL NAA+PROBE: DETECTED
G VAGINALIS DNA VAG NAA+PROBE-LOG#: >8 LOG (CELLS/ML)
LACTOBACILLUS DNA VAG NAA+PROBE-LOG#: NOT DETECTED LOG CELLS/ML
MEGASPHAERA SP DNA VAG NAA+PROBE-LOG#: >8 LOG (CELLS/ML)
N GONORRHOEA RRNA SPEC QL NAA+PROBE: NOT DETECTED
SL AMB BV CATEGORY:: ABNORMAL
SL AMB C. PARAPSILOSIS, DNA: NOT DETECTED
SL AMB C. TROPICALIS, DNA: NOT DETECTED
T VAGINALIS RRNA SPEC QL NAA+PROBE: NOT DETECTED

## 2021-09-08 ENCOUNTER — TELEPHONE (OUTPATIENT)
Dept: OBGYN CLINIC | Facility: CLINIC | Age: 46
End: 2021-09-08

## 2021-09-08 DIAGNOSIS — N76.0 BV (BACTERIAL VAGINOSIS): Primary | ICD-10-CM

## 2021-09-08 DIAGNOSIS — B96.89 BV (BACTERIAL VAGINOSIS): Primary | ICD-10-CM

## 2021-09-08 RX ORDER — METRONIDAZOLE 500 MG/1
500 TABLET ORAL EVERY 12 HOURS SCHEDULED
Qty: 14 TABLET | Refills: 0 | Status: SHIPPED | OUTPATIENT
Start: 2021-09-08 | End: 2021-09-15

## 2021-09-18 DIAGNOSIS — M79.7 FIBROMYALGIA: ICD-10-CM

## 2021-09-20 RX ORDER — DULOXETIN HYDROCHLORIDE 60 MG/1
CAPSULE, DELAYED RELEASE ORAL
Qty: 30 CAPSULE | Refills: 5 | Status: SHIPPED | OUTPATIENT
Start: 2021-09-20 | End: 2022-02-25 | Stop reason: SDUPTHER

## 2021-09-24 DIAGNOSIS — M79.7 FIBROMYALGIA: ICD-10-CM

## 2021-09-24 RX ORDER — GABAPENTIN 100 MG/1
CAPSULE ORAL
Qty: 90 CAPSULE | Refills: 2 | Status: SHIPPED | OUTPATIENT
Start: 2021-09-24 | End: 2022-02-25 | Stop reason: SDUPTHER

## 2021-10-11 ENCOUNTER — TELEPHONE (OUTPATIENT)
Dept: ADMINISTRATIVE | Facility: OTHER | Age: 46
End: 2021-10-11

## 2021-11-15 ENCOUNTER — VBI (OUTPATIENT)
Dept: ADMINISTRATIVE | Facility: OTHER | Age: 46
End: 2021-11-15

## 2021-12-20 DIAGNOSIS — T78.40XA ALLERGY, INITIAL ENCOUNTER: ICD-10-CM

## 2021-12-20 RX ORDER — MONTELUKAST SODIUM 10 MG/1
TABLET ORAL
Qty: 30 TABLET | Refills: 3 | Status: SHIPPED | OUTPATIENT
Start: 2021-12-20 | End: 2022-04-20

## 2022-01-11 ENCOUNTER — TELEMEDICINE (OUTPATIENT)
Dept: INTERNAL MEDICINE CLINIC | Facility: CLINIC | Age: 47
End: 2022-01-11
Payer: COMMERCIAL

## 2022-01-11 DIAGNOSIS — R73.03 PREDIABETES: ICD-10-CM

## 2022-01-11 DIAGNOSIS — E78.00 PURE HYPERCHOLESTEROLEMIA: ICD-10-CM

## 2022-01-11 DIAGNOSIS — B37.3 YEAST VAGINITIS: ICD-10-CM

## 2022-01-11 DIAGNOSIS — Z13.29 SCREENING FOR THYROID DISORDER: ICD-10-CM

## 2022-01-11 DIAGNOSIS — Z20.822 EXPOSURE TO COVID-19 VIRUS: ICD-10-CM

## 2022-01-11 DIAGNOSIS — U07.1 COVID-19: Primary | ICD-10-CM

## 2022-01-11 DIAGNOSIS — E55.9 VITAMIN D DEFICIENCY: ICD-10-CM

## 2022-01-11 PROCEDURE — 99213 OFFICE O/P EST LOW 20 MIN: CPT | Performed by: NURSE PRACTITIONER

## 2022-01-11 RX ORDER — AZITHROMYCIN 250 MG/1
TABLET, FILM COATED ORAL
Qty: 6 TABLET | Refills: 0 | Status: SHIPPED | OUTPATIENT
Start: 2022-01-11 | End: 2022-01-16

## 2022-01-11 RX ORDER — FLUCONAZOLE 150 MG/1
150 TABLET ORAL ONCE
Qty: 1 TABLET | Refills: 0 | Status: SHIPPED | OUTPATIENT
Start: 2022-01-11 | End: 2022-01-11

## 2022-01-11 NOTE — PROGRESS NOTES
COVID-19 Outpatient Progress Note    Assessment/Plan:  Continue to rest, hydrate, take vitamin c and d  Start z pack and diflucan  Problem List Items Addressed This Visit        Other    Prediabetes    Relevant Orders    CBC and differential    Comprehensive metabolic panel    HEMOGLOBIN A1C W/ EAG ESTIMATION    Microalbumin / creatinine urine ratio    Pure hypercholesterolemia    Relevant Orders    Lipid panel      Other Visit Diagnoses     COVID-19    -  Primary    Relevant Medications    azithromycin (Zithromax) 250 mg tablet    Exposure to COVID-19 virus        Relevant Orders    COVID Only- Collected at [facility] or [facility]    Yeast vaginitis        Relevant Medications    fluconazole (DIFLUCAN) 150 mg tablet    Vitamin D deficiency        Relevant Orders    Vitamin D 25 hydroxy    Screening for thyroid disorder        Relevant Orders    TSH, 3rd generation with Free T4 reflex         Disposition:     I recommended continued isolation until at least 24 hours have passed since recovery defined as resolution of fever without the use of fever-reducing medications AND improvement in COVID symptoms AND 10 days have passed since onset of symptoms (or 10 days have passed since date of first positive viral diagnostic test for asymptomatic patients)  I have spent 10 minutes directly with the patient  Greater than 50% of this time was spent in counseling/coordination of care regarding: diagnostic results, prognosis, risks and benefits of treatment options, instructions for management and patient and family education  Encounter provider EVITA Saab    Provider located at [address] [place] [address] [address]    Recent Visits  No visits were found meeting these conditions    Showing recent visits within past 7 days and meeting all other requirements  Today's Visits  Date Type Provider Dept   01/11/22 Telemedicine Sima Bergeron [address] today's visits and meeting all other requirements  Future Appointments  No visits were found meeting these conditions  Showing future appointments within next 150 days and meeting all other requirements     This virtual check-in was done via [employer] and patient was informed that this is a secure, HIPAA-compliant platform  She agrees to proceed  Patient agrees to participate in a virtual check in via telephone or video visit instead of presenting to the office to address urgent/immediate medical needs  Patient is aware this is a billable service  After connecting through [facility], the patient was identified by name and date of birth  Ruba Brewer was informed that this was a telemedicine visit and that the exam was being conducted confidentially over secure lines  My office door was closed  No one else was in the room  Ruba Brewer acknowledged consent and understanding of privacy and security of the telemedicine visit  I informed the patient that I have reviewed her record in Epic and presented the opportunity for her to ask any questions regarding the visit today  The patient agreed to participate  Verification of patient location:  Patient is located in the following state in which I hold an active license: [place]    Subjective:   Ruba Brewer is a 40 y o  female who has been screened for COVID-19  Symptom change since last report: unchanged  Patient's symptoms include fatigue, nasal congestion, rhinorrhea, cough, nausea, myalgias and headache  Patient denies fever, chills, malaise, sore throat, anosmia, loss of taste, shortness of breath, chest tightness, abdominal pain, vomiting and diarrhea  - Date of positive COVID-19 PCR: 1/3/2022  Type of test: Home antigen    COVID-19 vaccination status: Not vaccinated    Keyla Hill has been staying home and has isolated themselves in her home   She is taking care to not share personal items and is cleaning all surfaces that are touched often, like counters, tabletops, and doorknobs using household cleaning sprays or wipes  She is wearing a mask when she leaves her room  Patient's son was sick one week ago, came positive for covid  Now whole family is positive  Started as fatigue and congestion, then developed cough, and nausea/HA  Taking vitamin D and C  Taste and smell are just dull compared to normal, eating and drinking well 2 meals a day  No results found for: Molina Cast, SARSCORONAVI, CORONAVIRUSR, [address]  Past Medical History:   Diagnosis Date    Arthritis     Hiatal hernia     HPV (human papilloma virus) infection     Irritable bowel syndrome     Prediabetes      Past Surgical History:   Procedure Laterality Date    ADENOIDECTOMY      CERVICAL BIOPSY       SECTION      COLONOSCOPY      MA ESOPHAGOGASTRODUODENOSCOPY TRANSORAL DIAGNOSTIC N/A 2017    Procedure: EGD AND COLONOSCOPY;  Surgeon: Jono Castillo MD;  Location: [facility]; Service: Gastroenterology    MA LAP,CHOLECYSTECTOMY/GRAPH N/A 2018    Procedure: LAPAROSCOPIC CHOLECYSTECTOMY WITH IOC;  Surgeon: Bernadette Herman MD;  Location: [facility];  Service: General    TONSILLECTOMY      TUBAL LIGATION       Current Outpatient Medications   Medication Sig Dispense Refill    acetaminophen (TYLENOL) 325 mg tablet Take 325 mg by mouth every 6 (six) hours as needed for mild pain        azithromycin (Zithromax) 250 mg tablet Take 2 tablets (500 mg total) by mouth daily for 1 day, THEN 1 tablet (250 mg total) daily for 4 days  6 tablet 0    Cetirizine HCl (ZYRTEC ALLERGY) 10 MG CAPS Take by mouth daily        dicyclomine (BENTYL) 10 mg capsule TAKE 1 CAPSULE BY MOUTH 3 TIMES DAILY   90 capsule 8    DULoxetine (CYMBALTA) 60 mg delayed release capsule TAKE 1 CAPSULE BY MOUTH EVERY DAY 30 capsule 5    ergocalciferol (VITAMIN D2) 50,000 units       fluconazole (DIFLUCAN) 150 mg tablet Take 1 tablet (150 mg total) by mouth once for 1 dose 1 tablet 0    gabapentin (NEURONTIN) 100 mg capsule TAKE 1 CAPSULE BY MOUTH 3 TIMES A DAY START TAKING TWICE A DAY FOR 4 DAYS THEN INCREASE TO 3 TIMES A DAY  90 capsule 2    lidocaine (XYLOCAINE) 5 % ointment Apply topically as needed for mild pain 35 44 g 0    meloxicam (MOBIC) 15 mg tablet Take 15 mg by mouth daily      montelukast (SINGULAIR) 10 mg tablet TAKE 1 TABLET BY MOUTH DAILY AT BEDTIME 30 tablet 3    olopatadine HCl (PATADAY) 0 2 % opth drops Apply 0 2 % to eye as needed   (Patient not taking: Reported on 9/3/2021)      pantoprazole (PROTONIX) 40 mg tablet Take 1 tablet (40 mg total) by mouth daily 90 tablet 3    predniSONE 5 mg tablet Take as directed      valACYclovir (VALTREX) 1,000 mg tablet Take 1 tablet (1,000 mg total) by mouth 3 (three) times a day for 7 days (Patient not taking: Reported on 6/28/2021) 21 tablet 0     No current facility-administered medications for this visit  Allergies   Allergen Reactions    Nuts - Food Allergy Facial Swelling and Swelling     Other reaction(s): swollen tongue    Other Hives, Itching and Swelling     Sneezing, itchy eyes  Other reaction(s): swollen tongue  Pt is allergic to dogs  Beer    Desogestrel-Ethinyl Estradiol Hives    Pineapple - Food Allergy Tongue Swelling    Dog Epithelium Allergy Skin Test Itching     Other reaction(s): sneezing, itchy eyes    Dust Mite Extract Itching    Ortho Tri-Cyclen (28) [Norgestimate-Eth Estradiol] Other (See Comments)     Burning       Review of Systems   Constitutional: Positive for fatigue  Negative for chills and fever  HENT: Positive for congestion, rhinorrhea and sneezing  Negative for sore throat  Respiratory: Positive for cough  Negative for chest tightness and shortness of breath  Gastrointestinal: Positive for nausea  Negative for abdominal pain, diarrhea and vomiting  Musculoskeletal: Positive for myalgias     Neurological: Positive for headaches  Objective: There were no vitals filed for this visit  Physical Exam  Constitutional:       General: She is not in acute distress  Appearance: She is ill-appearing  HENT:      Head: Normocephalic and atraumatic  Right Ear: Hearing normal       Left Ear: Hearing normal       Nose: Congestion and rhinorrhea present  Pulmonary:      Effort: No tachypnea or respiratory distress  Neurological:      Mental Status: She is alert and oriented to person, place, and time  Psychiatric:         Attention and Perception: Attention normal          Mood and Affect: Mood normal          Speech: Speech normal          Behavior: Behavior normal  Behavior is cooperative  VIRTUAL VISIT DISCLAIMER    Joel Whiting verbally agrees to participate in [employer]  Pt is aware that [employer] could be limited without vital signs or the ability to perform a full hands-on physical exam  Chani Ramírez understands she or the provider may request at any time to terminate the video visit and request the patient to seek care or treatment in person

## 2022-01-11 NOTE — PATIENT INSTRUCTIONS
COVID-19 (Coronavirus Disease 2019)   AMBULATORY CARE:   Coronavirus disease 2019 (COVID-19)  is the disease caused by a coronavirus first discovered in December 2019  Coronaviruses generally cause upper respiratory (nose, throat, and lung) infections, such as a cold  The new virus spreads quickly and easily  The virus can be spread starting 2 days before symptoms even begin  The virus has also changed into several new forms (called variants) since it was discovered  The variants may be more contagious (easily spread) than the original form  Some may also cause more severe illness than others  It is important to follow local, national, and worldwide measures to protect yourself and others from infection  Signs and symptoms of COVID-19  may not develop  Signs and symptoms that develop usually start about 5 days after infection but can take 2 to 14 days  You may feel like you have the flu or a bad cold  Some signs and symptoms go away in a few days  Others can last weeks, months, or possibly years  Information on COVID-19 is still being learned  Tell your healthcare provider if you think you were infected but develop signs or symptoms not listed below:  · A cough    · Shortness of breath or trouble breathing that may become severe    · A fever of at least 100 4°F, or 38°C (may be lower in adults 65 or older)    · Chills that might include shaking    · Muscle pain, body aches, or a headache    · A sore throat    · Suddenly not being able to taste or smell anything    · Feeling mentally and physically tired (fatigue)    · Congestion (stuffy head and nose), or a runny nose    · Diarrhea, nausea, or vomiting    If you think you or someone you know may be infected:  Do the following to protect others:  · If emergency care is needed,  tell the  about the possible infection, or call ahead and tell the emergency department  · Call a healthcare provider  for instructions if symptoms are mild   Anyone who may be infected should not  arrive without calling first  The provider will need to protect staff members and other patients  · The person who may be infected needs to wear a face covering  while getting medical care  This will help lower the risk of infecting others  Coverings are not used for anyone who is younger than 2 years, has breathing problems, or cannot remove it  The provider can give you instructions for anyone who cannot wear a covering  Call your local emergency number (911 in the [address] [facility]) or an emergency department if:   · You have trouble breathing or shortness of breath at rest     · You have chest pain or pressure that lasts longer than 5 minutes  · You become confused or hard to wake  · Your lips or face are blue  · You have a fever of 104°F (40°C) or higher  Call your doctor if:   · You do not  have symptoms of COVID-19 but had close physical contact within 14 days with someone who tested positive  · You have questions or concerns about your condition or care  How COVID-19 is diagnosed: If you think you have COVID-19, call your healthcare provider  He or she will tell you what to do based on your symptoms and testing guidelines in your area  In general, the following may be used:  · A viral test  shows if you have a current infection  Samples are taken from your nose and throat, usually with swabs  You may need to wait several days to get the test results  Your healthcare provider will tell you how to get your results  You will need to quarantine (stay physically away from others) until you get your results  If results show you have COVID-19, you will need to continue until you are well  Your provider or other health official may give you more directions  You will also need to prevent another infection until it is known if you can get COVID-19 again  · An antibody test  shows if you had a past infection   Blood samples are used for this test  Antibodies are made by your immune system to attack the virus that causes COVID-19  Antibodies will form 1 to 3 weeks after you are infected  It is not known if antibodies prevent a second infection, or for how long a person might be protected  If you have antibodies, you will still need to be careful around others until more is known  · CT scans or x-rays  may be used to check for signs of pneumonia  The 2019 coronavirus causes a specific kind of pneumonia, usually in both lungs  The pictures may also be used to check for health problems in other parts of your body  Treatment  such as monoclonal antibodies and convalescent plasma have emergency use authorization (EUA)  This means they may be given only to patients who are hospitalized with severe signs and symptoms  The following may be used to manage your symptoms:  · Mild symptoms  may get better on their own  If you do not need to be treated in a hospital, you will be given instructions to use at home  Your condition will be closely monitored  You will need to watch for worsening symptoms and seek immediate care if needed  Talk to your healthcare provider about the following:    ? Decongestants  help reduce nasal congestion and help you breathe more easily  If you take decongestant pills, they may make you feel restless or cause problems with your sleep  Do not use decongestant sprays for more than a few days  ? Cough suppressants  help reduce coughing  Ask your healthcare provider which type of cough medicine is best for you  ? To soothe a sore throat,  gargle with warm salt water, or use throat lozenges or a throat spray  Drink more liquids to thin and loosen mucus and to prevent dehydration  ? NSAIDs or acetaminophen  can help lower a fever and relieve body aches or a headache  Follow directions  If not taken correctly, NSAIDs can cause kidney damage and acetaminophen can cause liver damage      · Severe or life-threatening symptoms  are treated in the hospital  You may need a combination of the following:    ? Medicines  may be given to lower or prevent inflammation or to fight the virus  You may also need blood thinners to prevent or treat blood clots  If you have a deep vein thrombosis (DVT) or pulmonary embolism (PE), you may need to keep using blood thinners for 3 months  ? Extra oxygen  may be given if you have respiratory failure  This means your lungs cannot get enough oxygen into your blood and out to your organs  Extra oxygen can help prevent organ failure  ? A ventilator  may be used to help you breathe  What you need to know about health problems the virus may cause:  Serious health problems may improve or continue for weeks, months, and possibly years  Health problems that continue may be called long COVID  Anyone can develop serious problems from this virus, but your risk is higher if you are 72 or older  A weak immune system, diabetes, or a heart or lung condition can also increase your risk  Your risk is also higher if you are a current or former cigarette smoker  COVID-19 can lead to any of the following:  · Multisymptom inflammatory syndrome in adults (MIS-A) or in children (MIS-C), causing inflammation in the heart, digestive system, skin, or brain    · Serious lower respiratory conditions, such as pneumonia or acute respiratory distress syndrome (ARDS)    · Blood vessel damage, leading to blood clots    · Organ damage from a lack of oxygen or from blood clots    · Sleep problems    · Problems thinking clearly, remembering information, or concentrating    · Mood changes, depression, or anxiety    What you need to know about COVID-19 vaccines:  Get a vaccine even if you already had COVID-19  · COVID-19 vaccines are given as a shot in 1 or 2 doses  Some vaccines have emergency use authorization (EUA)  An EUA means the vaccine is not approved but is given because the benefits outweigh the risks   A 2-dose vaccine is fully approved for use in those 16 years or older  This vaccine also has an EUA for adolescents 12 to 15 years  Your healthcare provider can help you understand the benefits and risks  · A third dose is recommended for adults with a weakened immune system who get a 2-dose vaccine  The third dose is given at least 28 days after the second  · Even after you get the vaccine, continue social distancing and other measures  Experts are still learning how well the vaccines work to prevent infection, transmission, and severe illness  Although rare, you can become infected after you get the vaccine  You may also be able to pass the virus to others without knowing you are infected  · After you get the vaccine, check local, national, and international travel rules  Check to see if you need to be tested before you travel  You may also need to quarantine after you return  Some countries require proof of a negative test before you leave  You should also be tested 3 to 5 days after you return from another country  How the 2019 coronavirus spreads: The following are ways the virus is thought to spread, but more information may be coming:  · Droplets are the main way all coronaviruses spread  The virus travels in droplets that form when a person talks, coughs, or sneezes  The droplets can also float in the air for minutes or hours  Infection happens when you breathe in the droplets or get them in your eyes or nose  Close personal contact with an infected person increases your risk for infection  This means being within 6 feet (2 meters) of the person for at least 15 minutes over 24 hours  · Person-to-person contact can spread the virus  For example, a person with the virus on his or her hands can spread it by shaking hands with someone  · The virus can stay on objects and surfaces for a short time  You may become infected by touching the object or surface and then touching your eyes or mouth      · An infected animal may be able to infect a person who touches it  This may happen at live markets or on a farm  Help lower the risk for COVID-19:  The best way to prevent infection is to avoid anyone who is infected, but this can be hard to do  An infected person can spread the virus before signs or symptoms begin, or even if signs or symptoms never develop  The following can help lower the risk for infection:      · Wash your hands often throughout the day  Use soap and water  Rub your soapy hands together, lacing your fingers, for at least 20 seconds  Rinse with warm, running water  Dry your hands with a clean towel or paper towel  Use hand  that contains alcohol if soap and water are not available  Teach children how to wash their hands and use hand   · Cover sneezes and coughs  Turn your face away and cover your mouth and nose with a tissue  Throw the tissue away  Use the bend of your arm if a tissue is not available  Then wash your hands well with soap and water or use hand   Teach children how to cover a cough or sneeze  · Wear a face covering (mask) around anyone who does not live in your home  Use a cloth covering with at least 2 layers  You can also create layers by putting a cloth covering over a disposable non-medical mask  Cover your mouth and your nose  The covering should fit snugly against the bridge of your nose  Securely fasten it under your chin and on the sides of your face  Do not  wear a plastic face shield instead of a covering  Continue social distancing and washing your hands often  A face covering is not a substitute for social distancing safety measures  · Follow worldwide, national, and local social distancing guidelines  Keep at least 6 feet (2 meters) between you and others  Also keep this distance from anyone who comes to your home, such as someone making a delivery  Wear a face covering while you are around others   You will need to wear a covering in restaurants, stores, and other public buildings  You will also need a covering on mass transit, such as a bus, subway, or airplane  Remember to use a covering made from thick material or wear 2 coverings together  · Make a habit of not touching your face  If you get the virus on your hands, you can transfer it to your eyes, nose, or mouth and become infected  You can also transfer it to objects, surfaces, or people  Do not touch your eyes, nose, or mouth without washing your hands first     · Clean and disinfect high-touch surfaces and objects often  Use disinfecting wipes, or make a solution of 4 teaspoons of bleach in 1 quart (4 cups) of water  Clean and disinfect even if you think no one living in or coming to your home is infected with the virus  · Ask about other vaccines you may need  Get the influenza (flu) vaccine as soon as recommended each year, usually starting in September or October  Get the pneumonia vaccine if recommended  Your healthcare provider can tell you if you should also get other vaccines, and when to get them  Follow social distancing guidelines:  National and local social distancing rules vary  Rules may change over time as restrictions are lifted  Restrictions may return if an outbreak happens where you live  It is important to know and follow all current social distancing rules in your area  The following are general guidelines:  · Stay home if you are sick or think you may have COVID-19  It is important to stay home if you are waiting for a testing appointment or for test results  Even if you do not have symptoms, you can pass the virus to others  · Limit trips out of your home  Have food, medicines, and other supplies delivered and left at your door or other area, if possible  Plan trips out of your home so you make the fewest stops possible to limit close personal contact  Keep track of places you go  This will help contact tracers notify others if you become infected      · Avoid close physical contact with anyone who does not live in your home  Do not shake hands with, hug, or kiss a person as a greeting  If you must use public transportation (such as a bus or subway), try to sit or stand away from others  Only allow necessary people into your home  Wear your face covering, and remind others to wear a face covering  Remind them to wash their hands when they arrive and before they leave  Do not  let someone into your home or go to someone's home just to visit  Even if you both do not feel sick, the virus can pass from one of you to the other  · Avoid in-person gatherings and crowds  Gatherings or crowds of 10 or more individuals can cause the virus to spread  Avoid places such as g[place], beaches, sporting events, and tourist attractions  For events such as parties, holiday meals, Hindu services, and conferences, attend virtually (on a computer), if possible  · Ask your healthcare provider for other ways to have appointments  Some providers offer phone, video, or other types of appointments  You may also be able to get prescriptions for a few months of your medicines at a time  · Stay safe if you must go out to work  Keep physical distance between you and other workers as much as possible  Follow your employer's rules so everyone stays safe  If you have COVID-19 and are recovering at home,  healthcare providers will give you specific instructions to follow  The following are general guidelines to remind you how to keep others safe until you are well:  · Wash your hands often  Use soap and water as much as possible  Use hand  that contains alcohol if soap and water are not available  Dry your hands with a clean towel or paper towel  Do not share towels with anyone  If you use paper towels, throw them away in a lined trash can kept in your room or area  Use a covered trash can, if possible  · Do not go out of your home unless it is necessary    Ask someone who is not infected to go out for groceries or supplies, or have them delivered  Do not go to your healthcare provider's office without an appointment  · Only have close physical contact with a person giving direct care, or a baby or child you must care for  Family members and friends should not visit you  If possible, stay in a separate area or room of your home if you live with others  No one should go into the area or room except to give you care  You can visit with others by phone, video chat, e-mail, or similar systems  · Wear a face covering while others are near you  This can help prevent droplets from spreading the virus when you talk, sneeze, or cough  Put the covering on before anyone comes into your room or area  Remind the person to cover his or her nose and mouth before coming in to provide care for you  · Do not share items  Do not share dishes, towels, or other items with anyone  Items need to be washed after you use them  · Protect your baby  Some newborns have tested positive for the virus  It is not known if they became infected before or after birth  The highest risk is when a  has close contact with an infected person  If you are pregnant or breastfeeding, talk to your healthcare provider or obstetrician about any concerns you have  He or she will tell you when to bring your baby in for check-ups and vaccines  He or she will also tell you what to do if you think your baby was infected with the coronavirus  Wash your hands and put on a clean face covering before you breastfeed or care for your baby  · Do not handle live animals unless it is necessary  Some animals, including pets, have been infected with the new coronavirus  Ask someone who is not infected to take care of your pet until you are well  If you must care for a pet, wear a face covering  Wash your hands before and after you give care  Talk to your healthcare provider about how to keep a service animal safe, if needed      · Follow directions from your healthcare provider for being around others after you recover  It is not known if or for how long a recovered person can pass the virus to others  Your provider may give you instructions, such as continuing social distancing and wearing a face covering  He or she will tell you when it is okay to be around others again  This may be 10 to 20 days after symptoms started or you had a positive test  Most symptoms will also need to be gone  Your provider will give you more information  Follow up with your doctor as directed:  Write down your questions so you remember to ask them during your visits  For more information:   · Centers for Disease Control and Prevention  [address] [place] , [address]  Phone: 3- 726 - [phone]  Web Address: [employer]    © [address] [place] [address] 2021 Information is for End User's use only and may not be sold, redistributed or otherwise used for commercial purposes  All illustrations and images included in CareNotes® are the copyrighted property of [employer] D A M , Inc  or [address] [place], [place] [address]   The above information is an  only  It is not intended as medical advice for individual conditions or treatments  Talk to your doctor, nurse or pharmacist before following any medical regimen to see if it is safe and effective for you

## 2022-01-11 NOTE — LETTER
January 11, 2022    Patient: Caio De La Rosa  YOB: 1975  Date of Last Encounter: 12/29/2021      To whom it may concern:     Caio De La Rosa has tested positive for COVID-19 (Coronavirus)  She may return to work on 1/17/2022, which is 10 days from illness onset (provided symptoms are improving) and 24 hours without fever      Sincerely,         EVITA Pitts

## 2022-01-20 ENCOUNTER — HOSPITAL ENCOUNTER (EMERGENCY)
Facility: HOSPITAL | Age: 47
Discharge: HOME | End: 2022-01-20
Attending: EMERGENCY MEDICINE
Payer: COMMERCIAL

## 2022-01-20 VITALS
SYSTOLIC BLOOD PRESSURE: 124 MMHG | TEMPERATURE: 98 F | OXYGEN SATURATION: 98 % | HEART RATE: 82 BPM | RESPIRATION RATE: 16 BRPM | DIASTOLIC BLOOD PRESSURE: 81 MMHG

## 2022-01-20 DIAGNOSIS — R55 NEAR SYNCOPE: Primary | ICD-10-CM

## 2022-01-20 LAB
ANION GAP SERPL CALCULATED.3IONS-SCNC: 9 MMOL/L (ref 4–13)
ATRIAL RATE: 92 BPM
BASOPHILS # BLD AUTO: 0.05 THOUSANDS/ΜL (ref 0–0.1)
BASOPHILS NFR BLD AUTO: 0 % (ref 0–1)
BUN SERPL-MCNC: 10 MG/DL (ref 5–25)
CALCIUM SERPL-MCNC: 9 MG/DL (ref 8.3–10.1)
CARDIAC TROPONIN I PNL SERPL HS: 3 NG/L
CHLORIDE SERPL-SCNC: 101 MMOL/L (ref 100–108)
CO2 SERPL-SCNC: 28 MMOL/L (ref 21–32)
CREAT SERPL-MCNC: 0.92 MG/DL (ref 0.6–1.3)
EOSINOPHIL # BLD AUTO: 0.19 THOUSAND/ΜL (ref 0–0.61)
EOSINOPHIL NFR BLD AUTO: 2 % (ref 0–6)
ERYTHROCYTE [DISTWIDTH] IN BLOOD BY AUTOMATED COUNT: 14.9 % (ref 11.6–15.1)
GFR SERPL CREATININE-BSD FRML MDRD: 75 ML/MIN/1.73SQ M
GLUCOSE SERPL-MCNC: 166 MG/DL (ref 65–140)
HCT VFR BLD AUTO: 39.2 % (ref 34.8–46.1)
HGB BLD-MCNC: 12.4 G/DL (ref 11.5–15.4)
IMM GRANULOCYTES # BLD AUTO: 0.14 THOUSAND/UL (ref 0–0.2)
IMM GRANULOCYTES NFR BLD AUTO: 1 % (ref 0–2)
LYMPHOCYTES # BLD AUTO: 1.6 THOUSANDS/ΜL (ref 0.6–4.47)
LYMPHOCYTES NFR BLD AUTO: 12 % (ref 14–44)
MCH RBC QN AUTO: 26.7 PG (ref 26.8–34.3)
MCHC RBC AUTO-ENTMCNC: 31.6 G/DL (ref 31.4–37.4)
MCV RBC AUTO: 85 FL (ref 82–98)
MONOCYTES # BLD AUTO: 0.93 THOUSAND/ΜL (ref 0.17–1.22)
MONOCYTES NFR BLD AUTO: 7 % (ref 4–12)
NEUTROPHILS # BLD AUTO: 10.09 THOUSANDS/ΜL (ref 1.85–7.62)
NEUTS SEG NFR BLD AUTO: 78 % (ref 43–75)
NRBC BLD AUTO-RTO: 0 /100 WBCS
P AXIS: 69 DEGREES
PLATELET # BLD AUTO: 323 THOUSANDS/UL (ref 149–390)
PMV BLD AUTO: 9.5 FL (ref 8.9–12.7)
POTASSIUM SERPL-SCNC: 4.4 MMOL/L (ref 3.5–5.3)
PR INTERVAL: 158 MS
QRS AXIS: 12 DEGREES
QRSD INTERVAL: 84 MS
QT INTERVAL: 366 MS
QTC INTERVAL: 452 MS
RBC # BLD AUTO: 4.64 MILLION/UL (ref 3.81–5.12)
SODIUM SERPL-SCNC: 138 MMOL/L (ref 136–145)
T WAVE AXIS: 74 DEGREES
VENTRICULAR RATE: 92 BPM
WBC # BLD AUTO: 13 THOUSAND/UL (ref 4.31–10.16)

## 2022-01-20 PROCEDURE — 85025 COMPLETE CBC W/AUTO DIFF WBC: CPT | Performed by: PHYSICIAN ASSISTANT

## 2022-01-20 PROCEDURE — 84484 ASSAY OF TROPONIN QUANT: CPT | Performed by: PHYSICIAN ASSISTANT

## 2022-01-20 PROCEDURE — 99284 EMERGENCY DEPT VISIT MOD MDM: CPT

## 2022-01-20 PROCEDURE — 93005 ELECTROCARDIOGRAM TRACING: CPT

## 2022-01-20 PROCEDURE — 93010 ELECTROCARDIOGRAM REPORT: CPT | Performed by: INTERNAL MEDICINE

## 2022-01-20 PROCEDURE — 96360 HYDRATION IV INFUSION INIT: CPT

## 2022-01-20 PROCEDURE — 99285 EMERGENCY DEPT VISIT HI MDM: CPT | Performed by: PHYSICIAN ASSISTANT

## 2022-01-20 PROCEDURE — 36415 COLL VENOUS BLD VENIPUNCTURE: CPT | Performed by: PHYSICIAN ASSISTANT

## 2022-01-20 PROCEDURE — 80048 BASIC METABOLIC PNL TOTAL CA: CPT | Performed by: PHYSICIAN ASSISTANT

## 2022-01-20 RX ADMIN — SODIUM CHLORIDE 1000 ML: 0.9 INJECTION, SOLUTION INTRAVENOUS at 09:14

## 2022-01-20 NOTE — ED PROVIDER NOTES
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 , covid positive on the third, fells better now  prehospital     38yo female with a history of prediabetes, irritable bowel syndrome, and fibromyalgia presenting via EMS after a near syncopal episode about 1 5 hours ago  Patient was diagnosed with COVID about 16 days ago and recently finished her quarantine period  She went to work today and was feeling unwell while driving to her job  Patient states she sat down at her desk and started to feel lightheaded and felt her vision go fuzzy  Patient states she was sweating and felt like she was about to pass out  Patient put her head down and drank juice  She went to the school nurse's office and EMS was activated  Her glucose was reportedly around 240 prehospital  Patient denies ever losing consciousness  No chest pain or shortness of breath  Patient feeling improved on arrival  She did not eat breakfast this morning  History provided by:  Patient and EMS personnel   used: No    Dizziness  Quality:  Lightheadedness  Severity:  Moderate  Onset quality:  Sudden  Timing:  Constant  Progression:  Resolved  Chronicity:  New  Relieved by:  Nothing  Worsened by:  Nothing  Associated symptoms: no chest pain, no diarrhea, no nausea, no palpitations, no shortness of breath, no syncope and no vomiting        Prior to Admission Medications   Prescriptions Last Dose Informant Patient Reported? Taking?    Cetirizine HCl (ZYRTEC ALLERGY) 10 MG CAPS  Self Yes No   Sig: Take by mouth daily     DULoxetine (CYMBALTA) 60 mg delayed release capsule   No No   Sig: TAKE 1 CAPSULE BY MOUTH EVERY DAY   acetaminophen (TYLENOL) 325 mg tablet  Self Yes No   Sig: Take 325 mg by mouth every 6 (six) hours as needed for mild pain     dicyclomine (BENTYL) 10 mg capsule  Self No No   Sig: TAKE 1 CAPSULE BY MOUTH 3 TIMES DAILY  ergocalciferol (VITAMIN D2) 50,000 units  Self Yes No   gabapentin (NEURONTIN) 100 mg capsule   No No   Sig: TAKE 1 CAPSULE BY MOUTH 3 TIMES A DAY START TAKING TWICE A DAY FOR 4 DAYS THEN INCREASE TO 3 TIMES A DAY  lidocaine (XYLOCAINE) 5 % ointment   No No   Sig: Apply topically as needed for mild pain   meloxicam (MOBIC) 15 mg tablet  Self Yes No   Sig: Take 15 mg by mouth daily   montelukast (SINGULAIR) 10 mg tablet   No No   Sig: TAKE 1 TABLET BY MOUTH DAILY AT BEDTIME   olopatadine HCl (PATADAY) 0 2 % opth drops  Self Yes No   Sig: Apply 0 2 % to eye as needed     Patient not taking: Reported on 9/3/2021   pantoprazole (PROTONIX) 40 mg tablet   No No   Sig: Take 1 tablet (40 mg total) by mouth daily   predniSONE 5 mg tablet   Yes No   Sig: Take as directed   valACYclovir (VALTREX) 1,000 mg tablet   No No   Sig: Take 1 tablet (1,000 mg total) by mouth 3 (three) times a day for 7 days   Patient not taking: Reported on 2021      Facility-Administered Medications: None       Past Medical History:   Diagnosis Date    Arthritis     Hiatal hernia     HPV (human papilloma virus) infection     Irritable bowel syndrome     Prediabetes        Past Surgical History:   Procedure Laterality Date    ADENOIDECTOMY      CERVICAL BIOPSY       SECTION      COLONOSCOPY      VT ESOPHAGOGASTRODUODENOSCOPY TRANSORAL DIAGNOSTIC N/A 2017    Procedure: EGD AND COLONOSCOPY;  Surgeon: Juan Barcenas MD;  Location: [facility];   Service: Gastroenterology    VT LAP,CHOLECYSTECTOMY/GRAPH N/A 2018    Procedure: LAPAROSCOPIC CHOLECYSTECTOMY WITH IOC;  Surgeon: Smith Shetty MD;  Location: [facility];  Service: General    TONSILLECTOMY      TUBAL LIGATION         Family History   Problem Relation Age of Onset    Arthritis Mother     Fibromyalgia Mother     Endometriosis Mother     Other Mother         Eye pressure, gallbladder removal    Hypertension Father     Diabetes Father    [facility] Diabetes type II Father     Other Father         Gallbladder removal     Pancreatic cancer Maternal Uncle     Esophageal cancer Maternal Grandfather     Heart disease Paternal Grandmother     Hyperthyroidism Paternal Grandmother     COPD Paternal Grandmother     Stroke Neg Hx     Thyroid cancer Neg Hx      I have reviewed and agree with the history as documented  E-Cigarette/Vaping    E-Cigarette Use Never User      E-Cigarette/Vaping Substances    Nicotine No     THC No     CBD No     Flavoring No     Other No     Unknown No      Social History     Tobacco Use    Smoking status: Current Every Day Smoker     Types: Cigarettes    Smokeless tobacco: Never Used   Vaping Use    Vaping Use: Never used   Substance Use Topics    Alcohol use: Yes     Alcohol/week: 1 0 standard drink     Types: 1 Glasses of wine per week     Comment: glass of wine every other week    Drug use: No       Review of Systems   Constitutional: Negative for chills and fever  HENT: Negative for drooling and voice change  Eyes: Negative for discharge and redness  Respiratory: Negative for shortness of breath and stridor  Cardiovascular: Negative for chest pain, palpitations, leg swelling and syncope  Gastrointestinal: Negative for diarrhea, nausea and vomiting  Musculoskeletal: Negative for neck pain and neck stiffness  Skin: Negative for color change and rash  Neurological: Positive for dizziness and light-headedness  Negative for seizures and syncope  Psychiatric/Behavioral: Negative for confusion  The patient is not nervous/anxious  All other systems reviewed and are negative  Physical Exam  Physical Exam  Vitals and nursing note reviewed  Constitutional:       General: She is not in acute distress  Appearance: She is well-developed  She is not diaphoretic  HENT:      Head: Normocephalic and atraumatic        Right Ear: External ear normal       Left Ear: External ear normal       Nose: Nose normal    Eyes:      General: No scleral icterus  Right eye: No discharge  Left eye: No discharge  Conjunctiva/sclera: Conjunctivae normal    Cardiovascular:      Rate and Rhythm: Normal rate and regular rhythm  Heart sounds: Normal heart sounds  No murmur heard  Pulmonary:      Effort: Pulmonary effort is normal  No respiratory distress  Breath sounds: Normal breath sounds  No stridor  No wheezing or rales  Abdominal:      General: Bowel sounds are normal  There is no distension  Palpations: Abdomen is soft  Tenderness: There is no abdominal tenderness  There is no guarding  Musculoskeletal:         General: No deformity  Normal range of motion  Cervical back: Normal range of motion and neck supple  Lymphadenopathy:      Cervical: No cervical adenopathy  Skin:     General: Skin is warm and dry  Neurological:      General: No focal deficit present  Mental Status: She is alert  She is not disoriented  GCS: GCS eye subscore is 4  GCS verbal subscore is 5  GCS motor subscore is 6     Psychiatric:         Mood and Affect: Mood normal          Behavior: Behavior normal          Vital Signs  ED Triage Vitals   Temperature Pulse Respirations Blood Pressure SpO2   01/20/22 1119 01/20/22 0854 01/20/22 0854 01/20/22 0854 01/20/22 0854   98 °F (36 7 °C) 99 16 162/83 99 %      Temp Source Heart Rate Source Patient Position - Orthostatic VS BP Location FiO2 (%)   01/20/22 1119 -- 01/20/22 0854 01/20/22 0854 --   Oral  Lying Right arm       Pain Score       --                  Vitals:    01/20/22 0854 01/20/22 1123   BP: 162/83 124/81   Pulse: 99 82   Patient Position - Orthostatic VS: Lying Lying         Visual Acuity  Visual Acuity      Most Recent Value   L Pupil Size (mm) 3   R Pupil Size (mm) 3          ED Medications  Medications   sodium chloride 0 9 % bolus 1,000 mL (0 mL Intravenous Stopped 1/20/22 1014)       Diagnostic Studies  Results Reviewed Procedure Component Value Units Date/Time    HS Troponin 0hr (reflex protocol) [[id number]]  (Normal) Collected: 01/20/22 0914    Lab Status: Final result Specimen: Blood from Arm, Right Updated: 01/20/22 1014     hs TnI 0hr 3 ng/L     Basic metabolic panel [[id number]]  (Abnormal) Collected: 01/20/22 0914    Lab Status: Final result Specimen: Blood from Arm, Right Updated: 01/20/22 0939     Sodium 138 mmol/L      Potassium 4 4 mmol/L      Chloride 101 mmol/L      CO2 28 mmol/L      ANION GAP 9 mmol/L      BUN 10 mg/dL      Creatinine 0 92 mg/dL      Glucose 166 mg/dL      Calcium 9 0 mg/dL      eGFR 75 ml/min/1 73sq m     Narrative:      [place] guidelines for Chronic Kidney Disease (CKD):     Stage 1 with normal or high GFR (GFR > 90 mL/min/1 73 square meters)    Stage 2 Mild CKD (GFR = 60-89 mL/min/1 73 square meters)    Stage 3A Moderate CKD (GFR = 45-59 mL/min/1 73 square meters)    Stage 3B Moderate CKD (GFR = 30-44 mL/min/1 73 square meters)    Stage 4 Severe CKD (GFR = 15-29 mL/min/1 73 square meters)    Stage 5 End Stage CKD (GFR <15 mL/min/1 73 square meters)  Note: GFR calculation is accurate only with a steady state creatinine    CBC and differential [[id number]]  (Abnormal) Collected: 01/20/22 0914    Lab Status: Final result Specimen: Blood from Arm, Right Updated: 01/20/22 0925     WBC 13 00 Thousand/uL      RBC 4 64 Million/uL      Hemoglobin 12 4 g/dL      Hematocrit 39 2 %      MCV 85 fL      MCH 26 7 pg      MCHC 31 6 g/dL      RDW 14 9 %      MPV 9 5 fL      Platelets 920 Thousands/uL      nRBC 0 /100 WBCs      Neutrophils Relative 78 %      Immat GRANS % 1 %      Lymphocytes Relative 12 %      Monocytes Relative 7 %      Eosinophils Relative 2 %      Basophils Relative 0 %      Neutrophils Absolute 10 09 Thousands/µL      Immature Grans Absolute 0 14 Thousand/uL      Lymphocytes Absolute 1 60 Thousands/µL      Monocytes Absolute 0 93 Thousand/µL      Eosinophils Absolute 0 19 Thousand/µL      Basophils Absolute 0 05 Thousands/µL                  No orders to display              Procedures  ECG 12 Lead Documentation Only    Date/Time: 1/20/2022 8:01 PM  Performed by: Nicola Mane PA-C  Authorized by: Nicola Mane PA-C     Indications / Diagnosis:  Dizziness  ECG reviewed by me, the ED Provider: yes    Patient location:  ED  Rate:     ECG rate:  92    ECG rate assessment: normal    Rhythm:     Rhythm: sinus rhythm    Ectopy:     Ectopy: none    QRS:     QRS axis:  Normal  Conduction:     Conduction: normal    ST segments:     ST segments:  Normal  T waves:     T waves: normal    Comments:      Low voltage QRS             ED Course                     MDM  Number of Diagnoses or Management Options  Near syncope: new and requires workup  Diagnosis management comments: 38yo female presenting after a near syncopal episode this morning  Felt lightheaded while at work  Put head down and drank juice with improvement  Did not eat breakfast this morning  Recently recovering from [employer]  No CP/SOB  Feeling improved currently  Vitals are stable  Exam is reassuring  Initial ED plan: Check cardiac labs and EKG  IV fluid bolus  Final assessment: Labs reveal a leukocytosis with a WBC of 13 which appears to be chronic  Glucose 166, hx of prediabetes  EKG is without ischemic changes and high sensitivity troponin normal  She remains asymptomatic on re-evaluation  No indication for admission at this time  Advised close PCP follow-up  ED return precautions discussed  Patient expressed understanding and is agreeable to plan  Patient discharged in stable condition           Amount and/or Complexity of Data Reviewed  Clinical lab tests: ordered and reviewed  Tests in the medicine section of CPT®: ordered and reviewed  Review and summarize past medical records: yes  Independent visualization of images, tracings, or specimens: yes    Risk of Complications, Morbidity, and/or Mortality  Presenting problems: moderate  Diagnostic procedures: moderate  Management options: moderate    Patient Progress  Patient progress: stable      Disposition  Final diagnoses:   Near syncope     Time reflects when diagnosis was documented in both MDM as applicable and the Disposition within this note     Time User Action Codes Description Comment    1/20/2022 10:43 AM Yudy Marquez Add [R55] Near syncope       ED Disposition     ED Disposition Condition Date/Time Comment    Discharge Stable Thu Jan 20, 2022 10:43 AM Hallie De Los Santos discharge to home/self care              Follow-up Information     Follow up With Specialties Details Why Contact Info Additional [phone] Nurse Practitioner, Internal Medicine Schedule an appointment as soon as possible for a visit   [address]       [address] [facility] Emergency Department Emergency Medicine  If symptoms worsen [address] Emergency Department, [address] [place] [address], [facility]          Discharge Medication List as of 1/20/2022 10:44 AM      CONTINUE these medications which have NOT CHANGED    Details   acetaminophen (TYLENOL) 325 mg tablet Take 325 mg by mouth every 6 (six) hours as needed for mild pain  , Historical Med      Cetirizine HCl (ZYRTEC ALLERGY) 10 MG CAPS Take by mouth daily  , Historical Med      dicyclomine (BENTYL) 10 mg capsule TAKE 1 CAPSULE BY MOUTH 3 TIMES DAILY , Normal      DULoxetine (CYMBALTA) 60 mg delayed release capsule TAKE 1 CAPSULE BY MOUTH EVERY DAY, Normal      ergocalciferol (VITAMIN D2) 50,000 units Starting Sun 12/27/2020, Historical Med      gabapentin (NEURONTIN) 100 mg capsule TAKE 1 CAPSULE BY MOUTH 3 TIMES A DAY START TAKING TWICE A DAY FOR 4 DAYS THEN INCREASE TO 3 TIMES A DAY , Normal      lidocaine (XYLOCAINE) 5 % ointment Apply topically as needed for mild pain, Starting Fri 9/3/2021, Normal      meloxicam (MOBIC) 15 mg tablet Take 15 mg by mouth daily, Starting Tue 12/15/2020, Historical Med      montelukast (SINGULAIR) 10 mg tablet TAKE 1 TABLET BY MOUTH DAILY AT BEDTIME, Normal      olopatadine HCl (PATADAY) 0 2 % opth drops Apply 0 2 % to eye as needed  , Starting Fri 2/6/2015, Historical Med      pantoprazole (PROTONIX) 40 mg tablet Take 1 tablet (40 mg total) by mouth daily, Starting Mon 6/28/2021, Normal      predniSONE 5 mg tablet Take as directed, Starting Wed 3/10/2021, Historical Med      valACYclovir (VALTREX) 1,000 mg tablet Take 1 tablet (1,000 mg total) by mouth 3 (three) times a day for 7 days, Starting Tue 12/10/2019, Until Mon 6/28/2021, Normal             No discharge procedures on file      PDMP Review     None          ED Provider  Electronically Signed by           Talisha Chandler PA-C  01/20/22 2007

## 2022-01-20 NOTE — Clinical Note
Duncan Mon was seen and treated in our emergency department on 1/20/2022  Diagnosis:     Benigno Kumar  may return to work on return date  She may return on this date: 01/21/2022         If you have any questions or concerns, please don't hesitate to call        Denilson Schultz PA-C    ______________________________           _______________          _______________  Hospital Representative                              Date                                Time

## 2022-01-20 NOTE — DISCHARGE INSTRUCTIONS
Drink plenty of fluids and hydrate  Eat breakfast before going to work  Please follow-up with your family doctor  Return to the ER with any worsening symptoms

## 2022-01-21 ENCOUNTER — OFFICE VISIT (OUTPATIENT)
Dept: INTERNAL MEDICINE CLINIC | Facility: CLINIC | Age: 47
End: 2022-01-21
Payer: COMMERCIAL

## 2022-01-21 ENCOUNTER — APPOINTMENT (OUTPATIENT)
Dept: LAB | Facility: CLINIC | Age: 47
End: 2022-01-21
Payer: COMMERCIAL

## 2022-01-21 VITALS
BODY MASS INDEX: 40.98 KG/M2 | SYSTOLIC BLOOD PRESSURE: 138 MMHG | HEART RATE: 88 BPM | WEIGHT: 255 LBS | HEIGHT: 66 IN | TEMPERATURE: 97.9 F | DIASTOLIC BLOOD PRESSURE: 90 MMHG | OXYGEN SATURATION: 100 %

## 2022-01-21 DIAGNOSIS — D72.828 OTHER ELEVATED WHITE BLOOD CELL (WBC) COUNT: ICD-10-CM

## 2022-01-21 DIAGNOSIS — E55.9 VITAMIN D DEFICIENCY: ICD-10-CM

## 2022-01-21 DIAGNOSIS — E78.00 PURE HYPERCHOLESTEROLEMIA: ICD-10-CM

## 2022-01-21 DIAGNOSIS — U07.1 COVID-19: ICD-10-CM

## 2022-01-21 DIAGNOSIS — Z13.29 SCREENING FOR THYROID DISORDER: ICD-10-CM

## 2022-01-21 DIAGNOSIS — R73.03 PREDIABETES: ICD-10-CM

## 2022-01-21 DIAGNOSIS — E11.9 TYPE 2 DIABETES MELLITUS WITHOUT COMPLICATION, WITHOUT LONG-TERM CURRENT USE OF INSULIN (HCC): Primary | ICD-10-CM

## 2022-01-21 LAB
25(OH)D3 SERPL-MCNC: 17.8 NG/ML (ref 30–100)
ALBUMIN SERPL BCP-MCNC: 3.6 G/DL (ref 3.5–5)
ALP SERPL-CCNC: 88 U/L (ref 46–116)
ALT SERPL W P-5'-P-CCNC: 53 U/L (ref 12–78)
ANION GAP SERPL CALCULATED.3IONS-SCNC: 5 MMOL/L (ref 4–13)
AST SERPL W P-5'-P-CCNC: 36 U/L (ref 5–45)
BASOPHILS # BLD AUTO: 0.04 THOUSANDS/ΜL (ref 0–0.1)
BASOPHILS NFR BLD AUTO: 0 % (ref 0–1)
BILIRUB SERPL-MCNC: 0.9 MG/DL (ref 0.2–1)
BUN SERPL-MCNC: 7 MG/DL (ref 5–25)
CALCIUM SERPL-MCNC: 9.4 MG/DL (ref 8.3–10.1)
CHLORIDE SERPL-SCNC: 104 MMOL/L (ref 100–108)
CHOLEST SERPL-MCNC: 232 MG/DL
CO2 SERPL-SCNC: 25 MMOL/L (ref 21–32)
CREAT SERPL-MCNC: 0.8 MG/DL (ref 0.6–1.3)
CREAT UR-MCNC: 215 MG/DL
DACRYOCYTES BLD QL SMEAR: PRESENT
EOSINOPHIL # BLD AUTO: 0.18 THOUSAND/ΜL (ref 0–0.61)
EOSINOPHIL NFR BLD AUTO: 2 % (ref 0–6)
ERYTHROCYTE [DISTWIDTH] IN BLOOD BY AUTOMATED COUNT: 15.4 % (ref 11.6–15.1)
GFR SERPL CREATININE-BSD FRML MDRD: 89 ML/MIN/1.73SQ M
GLUCOSE P FAST SERPL-MCNC: 129 MG/DL (ref 65–99)
HCT VFR BLD AUTO: 40.6 % (ref 34.8–46.1)
HDLC SERPL-MCNC: 32 MG/DL
HGB BLD-MCNC: 12.6 G/DL (ref 11.5–15.4)
IMM GRANULOCYTES # BLD AUTO: 0.08 THOUSAND/UL (ref 0–0.2)
IMM GRANULOCYTES NFR BLD AUTO: 1 % (ref 0–2)
LDLC SERPL CALC-MCNC: 138 MG/DL (ref 0–100)
LYMPHOCYTES # BLD AUTO: 2.09 THOUSANDS/ΜL (ref 0.6–4.47)
LYMPHOCYTES NFR BLD AUTO: 17 % (ref 14–44)
LYMPHOCYTES NFR BLD: 17 % (ref 14–44)
MCH RBC QN AUTO: 25.9 PG (ref 26.8–34.3)
MCHC RBC AUTO-ENTMCNC: 31 G/DL (ref 31.4–37.4)
MCV RBC AUTO: 83 FL (ref 82–98)
MICROALBUMIN UR-MCNC: 49.8 MG/L (ref 0–20)
MICROALBUMIN/CREAT 24H UR: 23 MG/G CREATININE (ref 0–30)
MONOCYTES # BLD AUTO: 0.71 THOUSAND/ΜL (ref 0.17–1.22)
MONOCYTES NFR BLD AUTO: 4 % (ref 4–12)
MONOCYTES NFR BLD AUTO: 6 % (ref 4–12)
NEUTROPHILS # BLD AUTO: 9.08 THOUSANDS/ΜL (ref 1.85–7.62)
NEUTS SEG NFR BLD AUTO: 74 % (ref 43–75)
NEUTS SEG NFR BLD AUTO: 79 % (ref 45–77)
NONHDLC SERPL-MCNC: 200 MG/DL
NRBC BLD AUTO-RTO: 0 /100 WBCS
PLATELET # BLD AUTO: 369 THOUSANDS/UL (ref 149–390)
PLATELET BLD QL SMEAR: ADEQUATE
PMV BLD AUTO: 10.2 FL (ref 8.9–12.7)
POTASSIUM SERPL-SCNC: 3.9 MMOL/L (ref 3.5–5.3)
PROT SERPL-MCNC: 7.7 G/DL (ref 6.4–8.2)
RBC # BLD AUTO: 4.87 MILLION/UL (ref 3.81–5.12)
SODIUM SERPL-SCNC: 134 MMOL/L (ref 136–145)
TOTAL CELLS COUNTED SPEC: 100
TRIGL SERPL-MCNC: 310 MG/DL
TSH SERPL DL<=0.05 MIU/L-ACNC: 2.23 UIU/ML (ref 0.36–3.74)
WBC # BLD AUTO: 12.18 THOUSAND/UL (ref 4.31–10.16)

## 2022-01-21 PROCEDURE — 85025 COMPLETE CBC W/AUTO DIFF WBC: CPT

## 2022-01-21 PROCEDURE — 80061 LIPID PANEL: CPT

## 2022-01-21 PROCEDURE — 83036 HEMOGLOBIN GLYCOSYLATED A1C: CPT

## 2022-01-21 PROCEDURE — 3008F BODY MASS INDEX DOCD: CPT | Performed by: FAMILY MEDICINE

## 2022-01-21 PROCEDURE — 3061F NEG MICROALBUMINURIA REV: CPT | Performed by: FAMILY MEDICINE

## 2022-01-21 PROCEDURE — 82570 ASSAY OF URINE CREATININE: CPT | Performed by: NURSE PRACTITIONER

## 2022-01-21 PROCEDURE — 84443 ASSAY THYROID STIM HORMONE: CPT

## 2022-01-21 PROCEDURE — 82306 VITAMIN D 25 HYDROXY: CPT

## 2022-01-21 PROCEDURE — 99214 OFFICE O/P EST MOD 30 MIN: CPT | Performed by: FAMILY MEDICINE

## 2022-01-21 PROCEDURE — 85007 BL SMEAR W/DIFF WBC COUNT: CPT

## 2022-01-21 PROCEDURE — 36415 COLL VENOUS BLD VENIPUNCTURE: CPT

## 2022-01-21 PROCEDURE — 82043 UR ALBUMIN QUANTITATIVE: CPT | Performed by: NURSE PRACTITIONER

## 2022-01-21 PROCEDURE — 80053 COMPREHEN METABOLIC PANEL: CPT

## 2022-01-21 RX ORDER — GLYBURIDE 2.5 MG/1
2.5 TABLET ORAL
Qty: 30 TABLET | Refills: 1 | Status: SHIPPED | OUTPATIENT
Start: 2022-01-21 | End: 2022-02-25 | Stop reason: SDUPTHER

## 2022-01-21 NOTE — PROGRESS NOTES
FOLLOW-UP OFFICE VISIT  [facility] - [facility]    NAME: Sylvie Cranker  AGE: 40 y o  SEX: female  : 1975     DATE: 2022     Assessment and Plan:     1  Type 2 diabetes mellitus without complication, without long-term current use of insulin ([facility])-poorly controlled  random glucose >200  Doesn't want metformin  Will start therapy below and check a1c    - glyBURIDE (DIABETA) 2 5 mg tablet; Take 1 tablet (2 5 mg total) by mouth daily with breakfast  Dispense: 30 tablet; Refill: 1    2  Other elevated white blood cell (WBC) count-chronic  Wbc consistently above 12 w/ elevated absolute neutrophils over a year now  Other cell lines now wnl  Has previously document thrombocytosis  Unclear etiology  Will obtain peripheral  smear    - Peripheral Smear; Future    3  COVID-19-recent infection  symptoms resolving  Return in about 4 weeks (around 2022) for Recheck  Chief Complaint:     Chief Complaint   Patient presents with    sugar high/bp high     started a few weeks - episode yesterday- semi coma- vision blurry, lethargic, fatigue, dizziness         History of Present Illness:     Pt presents for ER f/u  Since visit she has felt continued interimittent lightheadedness    Knows her blood sugar was high  Not on medication for diabetes  Hx of gestational diabetes requiring medication  Tested positive for covid 19 infection on 1/3/2022           Review of Systems:     Review of Systems   Constitutional: Positive for fatigue  Negative for fever  Respiratory: Negative for cough and shortness of breath  Cardiovascular: Negative for chest pain  Neurological: Positive for light-headedness and headaches          Problem List:     Patient Active Problem List   Diagnosis    Current moderate episode of major depressive disorder (HCC)    Irritable bowel syndrome    Fibromyalgia    Morbid obesity with BMI of 40 0-44 9, adult (HCC)    Chronic fatigue    Skin lesion    Prediabetes    Fatty liver    Arthralgia of multiple sites    Chronic bilateral low back pain with bilateral sciatica    Type 2 diabetes mellitus without complication (HCC)    Pure hypercholesterolemia        Objective:     /90 (BP Location: Left arm, Patient Position: Sitting)   Pulse 88   Temp 97 9 °F (36 6 °C) (Tympanic)   Ht 5' 6" (1 676 m)   Wt 116 kg (255 lb)   SpO2 100%   BMI 41 16 kg/m²     Physical Exam  HENT:      Head: Normocephalic and atraumatic  Right Ear: External ear normal       Left Ear: External ear normal    Eyes:      Conjunctiva/sclera: Conjunctivae normal       Pupils: Pupils are equal, round, and reactive to light  Cardiovascular:      Rate and Rhythm: Normal rate and regular rhythm  Heart sounds: No murmur heard  Pulmonary:      Effort: Pulmonary effort is normal       Breath sounds: Normal breath sounds  Abdominal:      General: Bowel sounds are normal       Palpations: Abdomen is soft  Neurological:      Mental Status: She is alert and oriented to person, place, and time        Gait: Gait normal          Pertinent Laboratory/Diagnostic Studies:    Laboratory Results: I have personally reviewed the pertinent laboratory results/reports     CBC:   Results from Last 12 Months   Lab Units 01/21/22  1323   WBC Thousand/uL 12 18*   RBC Million/uL 4 87   HEMOGLOBIN g/dL 12 6   HEMATOCRIT % 40 6   MCV fL 83   MCH pg 25 9*   MCHC g/dL 31 0*   RDW % 15 4*   MPV fL 10 2   PLATELETS Thousands/uL 369   NRBC AUTO /100 WBCs 0   NEUTROS PCT % 74   LYMPHS PCT % 17   MONOS PCT % 6   EOS PCT % 2   BASOS PCT % 0   NEUTROS ABS Thousands/µL 9 08*   LYMPHS ABS Thousands/µL 2 09   MONOS ABS Thousand/µL 0 71   EOS ABS Thousand/µL 0 18     Chemistry Profile:   Results from Last 12 Months   Lab Units 01/21/22  1323 01/20/22  0914 01/20/22  0914   POTASSIUM mmol/L 3 9   < > 4 4   CHLORIDE mmol/L 104   < > 101   CO2 mmol/L 25   < > 28   BUN mg/dL 7   < > 10   CREATININE mg/dL 0 80   < > 0 92   GLUCOSE FASTING mg/dL 129*  --   --    GLUCOSE RANDOM mg/dL  --   --  166*   CALCIUM mg/dL 9 4   < > 9 0   AST U/L 36  --   --    ALT U/L 53  --   --    ALK PHOS U/L 88  --   --    EGFR ml/min/1 73sq m 89   < > 75    < > = values in this interval not displayed  Endocrine Studies:   Results from Last 12 Months   Lab Units 01/21/22  1323   HEMOGLOBIN A1C % 7 3*   TSH 3RD GENERATON uIU/mL 2 230   TRIGLYCERIDES mg/dL 310*   CHOLESTEROL mg/dL 232*   HDL mg/dL 32*   LDL CALC mg/dL 138*   VIT D 25 HYDROXY ng/mL 17 8*       Radiology/Other Diagnostic Testing Results: I have personally reviewed pertinent reports          Radha LAMB HSPTLAisha Rast [facility]  1/28/2022 11:05 AM

## 2022-01-21 NOTE — LETTER
January 21, 2022     Patient: Andi Urias   YOB: 1975   Date of Visit: 1/21/2022       To Whom it May Concern:    Olga Hobson is under my professional care  She was seen in my office on 1/21/2022  She may return to work on 1/24/2022  If you have any questions or concerns, please don't hesitate to call           Sincerely,          Luna Brumfield, DO

## 2022-01-22 LAB
EST. AVERAGE GLUCOSE BLD GHB EST-MCNC: 163 MG/DL
HBA1C MFR BLD: 7.3 %

## 2022-01-22 PROCEDURE — 3051F HG A1C>EQUAL 7.0%<8.0%: CPT | Performed by: FAMILY MEDICINE

## 2022-01-24 ENCOUNTER — TELEPHONE (OUTPATIENT)
Dept: INTERNAL MEDICINE CLINIC | Facility: CLINIC | Age: 47
End: 2022-01-24

## 2022-01-27 ENCOUNTER — TELEPHONE (OUTPATIENT)
Dept: INTERNAL MEDICINE CLINIC | Facility: CLINIC | Age: 47
End: 2022-01-27

## 2022-01-27 ENCOUNTER — TELEPHONE (OUTPATIENT)
Dept: HEMATOLOGY ONCOLOGY | Facility: CLINIC | Age: 47
End: 2022-01-27

## 2022-01-27 DIAGNOSIS — D72.828 OTHER ELEVATED WHITE BLOOD CELL (WBC) COUNT: Primary | ICD-10-CM

## 2022-01-27 DIAGNOSIS — D72.89 ATYPICAL LYMPHOCYTES PRESENT ON PERIPHERAL BLOOD SMEAR: ICD-10-CM

## 2022-01-27 NOTE — TELEPHONE ENCOUNTER
New Patient Intake Form   Patient Details:    Poncho Lemons  1975  [id number]    Appointment Information   Who is calling to schedule? Patient    If not self, what is the caller's name? Please put name of RBC nurse as well  Referring provider Dr Coretta Clemente    What is the diagnosis? Other elevated white blood cell (WBC) count   Is there a confirmed tissue diagnosis? no   Is patient aware of diagnosis? yes   Have you had any imaging or labs done? If yes, where? (If imaging done outside of [facility], please remind patient to bring a disk ) Yes    Have you been seen by another Oncologist/Hematologist?  If so, who and where? no   Are the records in [facility] or Care Everywhere? yes   Are records needed from an outside facility? no   If yes, Name of facility, city and state where facility is located  N/a    Preferred [place]   Is the patient willing to be seen by another provider?   (This is for breast patients only)    Miscellaneous Information:

## 2022-01-27 NOTE — TELEPHONE ENCOUNTER
S/w Pt she advised she spoke with Dr Anderson this morning and Hemotology has already scheduled her

## 2022-01-27 NOTE — TELEPHONE ENCOUNTER
----- Message from Walker Sweet DO sent at 1/27/2022  8:34 AM EST -----  Reviewed 1/21/22 studies with pt  Lifestyle modification discussed for cholesterol  Pt to take Vit  D supplementation  Pt on antihyperglycemic agent  Referral for hematology-oncology placed

## 2022-02-10 NOTE — PROGRESS NOTES
[facility] [address] [address]  Hematology Ambulatory Consult  Sonu Howard, 1975, [id number]  2/11/2022    Assessment/Plan:  1  Leukocytosis, unspecified type  2  Abnormality of red blood cells  Ms Elkins is a 69-year-old female seen in consultation for leukocytosis with elevated neutrophils and teardrop shaped cells noted on peripheral smear  In review of symptoms is difficult to decipher her fatigue due to her pain related to her fibromyalgia diagnosis  She does not have any other specific symptoms at this time  The elevated white blood cells have been noted dating back to 2019  Since then she has not had any new or concerning changes in symptoms  She has had multiple episodes of infections including bronchitis, sinus infections, COVID, gastritis that could be contributing to this elevation in WBCs  I explained to her the differential diagnoses for tear drop shaped cells on peripheral smear include myelofibrosis, iron deficiency, thalassemia  I explained in detail the initial workup listed below  She will need prior authorization for her insurance prior to having these labs drawn  Depending on the results of the blood work ordered today she may need further workup prior to her next follow-up visit  Explained to her that I will call her with the results of this and talk about further testing if needed  I also discussed that she may need to follow up with a physician with our practice to discuss treatments/prognosis  - Ambulatory Referral to Hematology / Oncology  - US abdomen complete; Future  - BCR/ABL, PCR; Future  - Calreticulin (CALR) Mutation; Future  - JAK2 V617F,Ql,W/RFL Exons 12,13 and MPL K339,K133; Future  - Leukemia/Lymphoma flow cytometry; Future  - Iron Panel (Includes Ferritin, Iron Sat%, Iron, and TIBC);  Future  - CBC and differential  - Peripheral Smear; Future  - Gene test beta-thalassemia; Future  - Hemoglobin Electrophoresis; Future      3  Menorrhagia with regular cycle  Her last iron panel was in 2020 and she has been taking oral iron daily since  I am unsure whether not she is absorbing the oral iron due to her IBS-D  Her menstrual cycle is 20 heavy but regular  Her gyn has suggested at [facility]  in the past   I suggested she follow-up with them regarding this issue     - Iron Panel (Includes Ferritin, Iron Sat%, Iron, and TIBC); Future    4  Irritable bowel syndrome with diarrhea  See above  5  Chronic fatigue  Iron deficiency can be contributing to her fatigue, but unclear whether not this is directly related to her fibromyalgia or underlying bone marrow disorder  Needs further workup  6  Splenomegaly  Unable to truly appreciate this during my exam today due to patient discomfort  She does occasionally have right upper quadrant stabbing/shooting pains  On last ultrasound in 2017 the spleen was not noted  Will obtain ultrasound to assess for splenomegaly  - US abdomen complete; Future        The patient is scheduled for follow-up in approximately 3 weeks  Patient voiced agreement and understanding to the above  Patient knows to call the Hematology/Oncology office with any questions and concerns regarding the above  Barrier(s) to care: None  The patient is able to self care     -------------------------------------------------------------------------------------------------------    Chief Complaint   Patient presents with    Consult       Referring provider:  Raina Smith DO  [address] [place] [address]    History of present illness:  Nikhil Courser is a 56-year-old female seen in consultation at the request of Dr Anny Dooley for leukocytosis  History obtained from the patient, her mother, and review of records   In review of records this a new problem as of 6/17/2019 as labs prior to that date demonstrate WBCs within normal range   Over the last year she has been diagnosed with bronchitis and several sinus infections, IBS with diarrhea, gastritis, COVID in January  She was also diagnosed with HPV and now gets yearly Pap smears  She has significant menorrhagia with irregular cycle lasting 7 days and 2 of the days are heavy with lots of large clots and changing a pad or tampon every 2 hours  She has been taking vitamin-D and oral iron daily over the last 2 years  She has been tolerating the oral iron fine without any nausea or constipation  She does not complain of any specific B-symptoms; fevers, night sweats, early satiety  She has had fatigue and generalized pain due to her fibromyalgia  She does not get restful sleep related to the pain  Unsure if the lack of sleep and fatigue is contributing to her brain fog and decreased concentration  11/17/2017:  WBC 8 42, ANC 6 26, hemoglobin 13, MCV 87, platelets 009  0/5/2605:  WBC 8 83, ANC 6 6, hemoglobin 12 6, MCV 86, platelets 581  4/41/9555:  WBC 12 11, ANC 8 62, hemoglobin 11 5, MCV 85, platelets 132  88/77/0994:  WBC 12 56, ANC 9 21, hemoglobin 10 8, MCV 78, platelets 076  17/58/7831:  WBC 11 78, ANC 8 34, hemoglobin 11, MCV 79, platelets 370   Ferritin 8, iron saturation 4%, TIBC 434, iron 18  3/4/2021:  WBC 13 35, ANC 10 19, hemoglobin 11 7, MCV 83, platelets 117  4/81/9923:  WBC 12 18, ANC 9 08, hemoglobin 12 6, MCV 83, platelets 970   Peripheral smear: Neutrophils 79%, tear drop cells present     Review of Systems   Constitutional: Positive for fatigue  Negative for activity change, appetite change, fever and unexpected weight change  HENT: Negative for trouble swallowing and voice change  Eyes: Negative for photophobia and visual disturbance  Respiratory: Negative for cough, chest tightness and shortness of breath  Cardiovascular: Negative for chest pain, palpitations and leg swelling     Gastrointestinal: Positive for abdominal distention, abdominal pain and diarrhea  Negative for blood in stool, constipation, nausea and vomiting  Endocrine: Negative for cold intolerance and heat intolerance  Genitourinary: Positive for menstrual problem  Negative for difficulty urinating and hematuria  Musculoskeletal: Positive for arthralgias and myalgias  Neurological: Positive for headaches  Negative for dizziness, tremors, syncope, weakness and light-headedness  Hematological: Negative for adenopathy  Does not bruise/bleed easily  Psychiatric/Behavioral: Positive for sleep disturbance  Patient Active Problem List   Diagnosis    Current moderate episode of major depressive disorder ([facility] )    Irritable bowel syndrome    Fibromyalgia    Morbid obesity with BMI of 40 0-44 9, adult (HCC)    Chronic fatigue    Skin lesion    Prediabetes    Fatty liver    Arthralgia of multiple sites    Chronic bilateral low back pain with bilateral sciatica    Type 2 diabetes mellitus without complication ([facility] )    Pure hypercholesterolemia       Past Medical History:   Diagnosis Date    Arthritis     Hiatal hernia     HPV (human papilloma virus) infection     Irritable bowel syndrome     Prediabetes        Past Surgical History:   Procedure Laterality Date    ADENOIDECTOMY      CERVICAL BIOPSY       SECTION      COLONOSCOPY      SD ESOPHAGOGASTRODUODENOSCOPY TRANSORAL DIAGNOSTIC N/A 2017    Procedure: EGD AND COLONOSCOPY;  Surgeon: Kylee Varela MD;  Location: [facility];   Service: Gastroenterology    SD LAP,CHOLECYSTECTOMY/GRAPH N/A 2018    Procedure: LAPAROSCOPIC CHOLECYSTECTOMY WITH IOC;  Surgeon: Odilia Craven MD;  Location: [facility];  Service: General    TONSILLECTOMY      TUBAL LIGATION         Family History   Problem Relation Age of Onset    Arthritis Mother     Fibromyalgia Mother     Endometriosis Mother     Other Mother         Eye pressure, gallbladder removal    Hypertension Father     Diabetes Father     Diabetes type II Father     Other Father         Gallbladder removal     Pancreatic cancer Maternal Uncle     Esophageal cancer Maternal Grandfather     Heart disease Paternal Grandmother     Hyperthyroidism Paternal Grandmother     COPD Paternal Grandmother     Stroke Neg Hx     Thyroid cancer Neg Hx        Social History     Socioeconomic History    Marital status: Legally      Spouse name: None    Number of children: None    Years of education: None    Highest education level: None   Occupational History    None   Tobacco Use    Smoking status: Current Every Day Smoker     Types: Cigarettes    Smokeless tobacco: Never Used   Vaping Use    Vaping Use: Never used   Substance and Sexual Activity    Alcohol use: Yes     Alcohol/week: 1 0 standard drink     Types: 1 Glasses of wine per week     Comment: glass of wine every other week    Drug use: No    Sexual activity: Yes     Partners: Male     Birth control/protection: Female Sterilization   Other Topics Concern    None   Social History Narrative    None     Social Determinants of Health     Financial Resource Strain: Not on file   Food Insecurity: Not on file   Transportation Needs: Not on file   Physical Activity: Not on file   Stress: Not on file   Social Connections: Not on file   Intimate Partner Violence: Not on file   Housing Stability: Not on file         Current Outpatient Medications:     acetaminophen (TYLENOL) 325 mg tablet, Take 325 mg by mouth every 6 (six) hours as needed for mild pain  , Disp: , Rfl:     Cetirizine HCl (ZYRTEC ALLERGY) 10 MG CAPS, Take by mouth daily  , Disp: , Rfl:     dicyclomine (BENTYL) 10 mg capsule, TAKE 1 CAPSULE BY MOUTH 3 TIMES DAILY  , Disp: 90 capsule, Rfl: 8    DULoxetine (CYMBALTA) 60 mg delayed release capsule, TAKE 1 CAPSULE BY MOUTH EVERY DAY, Disp: 30 capsule, Rfl: 5    ergocalciferol (VITAMIN D2) 50,000 units, , Disp: , Rfl:     gabapentin (NEURONTIN) 100 mg capsule, TAKE 1 CAPSULE BY MOUTH 3 TIMES A DAY START TAKING TWICE A DAY FOR 4 DAYS THEN INCREASE TO 3 TIMES A DAY , Disp: 90 capsule, Rfl: 2    glyBURIDE (DIABETA) 2 5 mg tablet, Take 1 tablet (2 5 mg total) by mouth daily with breakfast, Disp: 30 tablet, Rfl: 1    lidocaine (XYLOCAINE) 5 % ointment, Apply topically as needed for mild pain, Disp: 35 44 g, Rfl: 0    montelukast (SINGULAIR) 10 mg tablet, TAKE 1 TABLET BY MOUTH DAILY AT BEDTIME, Disp: 30 tablet, Rfl: 3    pantoprazole (PROTONIX) 40 mg tablet, Take 1 tablet (40 mg total) by mouth daily, Disp: 90 tablet, Rfl: 3    meloxicam (MOBIC) 15 mg tablet, Take 15 mg by mouth daily (Patient not taking: Reported on 1/21/2022 ), Disp: , Rfl:     olopatadine HCl (PATADAY) 0 2 % opth drops, Apply 0 2 % to eye as needed   (Patient not taking: Reported on 9/3/2021), Disp: , Rfl:     predniSONE 5 mg tablet, Take as directed (Patient not taking: Reported on 1/21/2022 ), Disp: , Rfl:     valACYclovir (VALTREX) 1,000 mg tablet, Take 1 tablet (1,000 mg total) by mouth 3 (three) times a day for 7 days (Patient not taking: Reported on 6/28/2021), Disp: 21 tablet, Rfl: 0    Allergies   Allergen Reactions    Nuts - Food Allergy Facial Swelling and Swelling     Other reaction(s): swollen tongue    Other Hives, Itching and Swelling     Sneezing, itchy eyes  Other reaction(s): swollen tongue  Pt is allergic to dogs  Beer    Desogestrel-Ethinyl Estradiol Hives    Pineapple - Food Allergy Tongue Swelling    Dog Epithelium Allergy Skin Test Itching     Other reaction(s): sneezing, itchy eyes    Dust Mite Extract Itching    Ortho Tri-Cyclen (28) [Norgestimate-Eth Estradiol] Other (See Comments)     Burning       Objective:  /98 (BP Location: Left arm, Patient Position: Sitting, Cuff Size: Adult)   Pulse 100   Temp 97 9 °F (36 6 °C)   Resp 17   Ht 5' 2" (1 575 m)   Wt 116 kg (255 lb)   SpO2 98%   BMI 46 64 kg/m²   Physical Exam  Constitutional:       General: She is not in acute distress  Appearance: Normal appearance  She is not ill-appearing  HENT:      Head: Atraumatic  Eyes:      Extraocular Movements: Extraocular movements intact  Conjunctiva/sclera: Conjunctivae normal    Cardiovascular:      Rate and Rhythm: Normal rate and regular rhythm  Pulses: Normal pulses  Heart sounds: Normal heart sounds  Pulmonary:      Effort: Pulmonary effort is normal       Breath sounds: Normal breath sounds  Abdominal:      General: Bowel sounds are normal       Palpations: Abdomen is soft  Tenderness: There is abdominal tenderness (LUQ)  Musculoskeletal:      Right lower leg: No edema  Left lower leg: No edema  Lymphadenopathy:      Cervical: No cervical adenopathy  Skin:     General: Skin is warm and dry  Capillary Refill: Capillary refill takes less than 2 seconds  Neurological:      General: No focal deficit present  Mental Status: She is alert and oriented to person, place, and time  Mental status is at baseline  Motor: No weakness  Gait: Gait normal    Psychiatric:         Mood and Affect: Mood normal          Behavior: Behavior normal          Thought Content:  Thought content normal          Judgment: Judgment normal          Result Review  Labs:   Appointment on 01/21/2022   Component Date Value Ref Range Status    WBC 01/21/2022 12 18* 4 31 - 10 16 Thousand/uL Final    RBC 01/21/2022 4 87  3 81 - 5 12 Million/uL Final    Hemoglobin 01/21/2022 12 6  11 5 - 15 4 g/dL Final    Hematocrit 01/21/2022 40 6  34 8 - 46 1 % Final    MCV 01/21/2022 83  82 - 98 fL Final    MCH 01/21/2022 25 9* 26 8 - 34 3 pg Final    MCHC 01/21/2022 31 0* 31 4 - 37 4 g/dL Final    RDW 01/21/2022 15 4* 11 6 - 15 1 % Final    MPV 01/21/2022 10 2  8 9 - 12 7 fL Final    Platelets 12/83/6017 369  149 - 390 Thousands/uL Final    nRBC 01/21/2022 0  /100 WBCs Final    Neutrophils Relative 01/21/2022 74  43 - 75 % Final    Immat GRANS % 01/21/2022 1  0 - 2 % Final    Lymphocytes Relative 01/21/2022 17  14 - 44 % Final    Monocytes Relative 01/21/2022 6  4 - 12 % Final    Eosinophils Relative 01/21/2022 2  0 - 6 % Final    Basophils Relative 01/21/2022 0  0 - 1 % Final    Neutrophils Absolute 01/21/2022 9 08* 1 85 - 7 62 Thousands/µL Final    Immature Grans Absolute 01/21/2022 0 08  0 00 - 0 20 Thousand/uL Final    Lymphocytes Absolute 01/21/2022 2 09  0 60 - 4 47 Thousands/µL Final    Monocytes Absolute 01/21/2022 0 71  0 17 - 1 22 Thousand/µL Final    Eosinophils Absolute 01/21/2022 0 18  0 00 - 0 61 Thousand/µL Final    Basophils Absolute 01/21/2022 0 04  0 00 - 0 10 Thousands/µL Final    Sodium 01/21/2022 134* 136 - 145 mmol/L Final    Potassium 01/21/2022 3 9  3 5 - 5 3 mmol/L Final    Chloride 01/21/2022 104  100 - 108 mmol/L Final    CO2 01/21/2022 25  21 - 32 mmol/L Final    ANION GAP 01/21/2022 5  4 - 13 mmol/L Final    BUN 01/21/2022 7  5 - 25 mg/dL Final    Creatinine 01/21/2022 0 80  0 60 - 1 30 mg/dL Final    Standardized to IDMS reference method    Glucose, Fasting 01/21/2022 129* 65 - 99 mg/dL Final    Specimen collection should occur prior to Sulfasalazine administration due to the potential for falsely depressed results  Specimen collection should occur prior to Sulfapyridine administration due to the potential for falsely elevated results   Calcium 01/21/2022 9 4  8 3 - 10 1 mg/dL Final    AST 01/21/2022 36  5 - 45 U/L Final    Specimen collection should occur prior to Sulfasalazine administration due to the potential for falsely depressed results   ALT 01/21/2022 53  12 - 78 U/L Final    Specimen collection should occur prior to Sulfasalazine and/or Sulfapyridine administration due to the potential for falsely depressed results       Alkaline Phosphatase 01/21/2022 88  46 - 116 U/L Final    Total Protein 01/21/2022 7 7  6 4 - 8 2 g/dL Final    Albumin 01/21/2022 3 6  3 5 - 5 0 g/dL Final    Total Bilirubin 01/21/2022 0 90 0 20 - 1 00 mg/dL Final    Use of this assay is not recommended for patients undergoing treatment with eltrombopag due to the potential for falsely elevated results   eGFR 01/21/2022 89  ml/min/1 73sq m Final    Cholesterol 01/21/2022 232* See Comment mg/dL Final    Cholesterol:         Pediatric <18 Years        Desirable          <170 mg/dL      Borderline High    170-199 mg/dL      High               >=200 mg/dL        Adult >=18 Years            Desirable         <200 mg/dL      Borderline High   200-239 mg/dL      High              >239 mg/dL      Triglycerides 01/21/2022 310* See Comment mg/dL Final    Triglyceride:     0-9Y            <75mg/dL     10Y-17Y         <90 mg/dL       >=18Y     Normal          <150 mg/dL     Borderline High 150-199 mg/dL     High            200-499 mg/dL        Very High       >499 mg/dL    Specimen collection should occur prior to N-Acetylcysteine or Metamizole administration due to the potential for falsely depressed results   HDL, Direct 01/21/2022 32* >=50 mg/dL Final    Specimen collection should occur prior to Metamizole administration due to the potential for falsley depressed results   LDL Calculated 01/21/2022 138* 0 - 100 mg/dL Final    LDL Cholesterol:     Optimal           <100 mg/dl     Near Optimal      100-129 mg/dl     Above Optimal       Borderline High 130-159 mg/dl       High            160-189 mg/dl       Very High       >189 mg/dl         This screening LDL is a calculated result  It does not have the accuracy of the Direct Measured LDL in the monitoring of patients with hyperlipidemia and/or statin therapy  Direct Measure LDL (VEY703) must be ordered separately in these patients      Non-HDL-Chol (CHOL-HDL) 01/21/2022 200  mg/dl Final    TSH 3RD GENERATON 01/21/2022 2 230  0 358 - 3 740 uIU/mL Final    The recommended reference ranges for TSH during pregnancy are as follows:   First trimester 0 1 to 2 5 uIU/mL   Second trimester  0 2 to 3 0 uIU/mL   Third trimester 0 3 to 3 0 uIU/m    Note: Normal ranges may not apply to patients who are transgender, non-binary, or whose legal sex, sex at birth, and gender identity differ   Hemoglobin A1C 01/21/2022 7 3* Normal 3 8-5 6%; PreDiabetic 5 7-6 4%;  Diabetic >=6 5%; Glycemic control for adults with diabetes <7 0% % Final    EAG 01/21/2022 163  mg/dl Final    Vit D, 25-Hydroxy 01/21/2022 17 8* 30 0 - 100 0 ng/mL Final    Segmented Neutrophils Manual 01/21/2022 79* 45 - 77 % Final    Lymphocytes Manual 01/21/2022 17  14 - 44 % Final    Monocytes Manual 01/21/2022 4  4 - 12 % Final    Total Counted 01/21/2022 100   Final    Tear Drop Cells 01/21/2022 Present   Final    Platelet Estimate 27/50/6415 Adequate  Adequate Final   Admission on 01/20/2022, Discharged on 01/20/2022   Component Date Value Ref Range Status    WBC 01/20/2022 13 00* 4 31 - 10 16 Thousand/uL Final    RBC 01/20/2022 4 64  3 81 - 5 12 Million/uL Final    Hemoglobin 01/20/2022 12 4  11 5 - 15 4 g/dL Final    Hematocrit 01/20/2022 39 2  34 8 - 46 1 % Final    MCV 01/20/2022 85  82 - 98 fL Final    MCH 01/20/2022 26 7* 26 8 - 34 3 pg Final    MCHC 01/20/2022 31 6  31 4 - 37 4 g/dL Final    RDW 01/20/2022 14 9  11 6 - 15 1 % Final    MPV 01/20/2022 9 5  8 9 - 12 7 fL Final    Platelets 10/68/8904 323  149 - 390 Thousands/uL Final    nRBC 01/20/2022 0  /100 WBCs Final    Neutrophils Relative 01/20/2022 78* 43 - 75 % Final    Immat GRANS % 01/20/2022 1  0 - 2 % Final    Lymphocytes Relative 01/20/2022 12* 14 - 44 % Final    Monocytes Relative 01/20/2022 7  4 - 12 % Final    Eosinophils Relative 01/20/2022 2  0 - 6 % Final    Basophils Relative 01/20/2022 0  0 - 1 % Final    Neutrophils Absolute 01/20/2022 10 09* 1 85 - 7 62 Thousands/µL Final    Immature Grans Absolute 01/20/2022 0 14  0 00 - 0 20 Thousand/uL Final    Lymphocytes Absolute 01/20/2022 1 60  0 60 - 4 47 Thousands/µL Final    Monocytes Absolute 01/20/2022 0  93  0 17 - 1 22 Thousand/µL Final    Eosinophils Absolute 01/20/2022 0 19  0 00 - 0 61 Thousand/µL Final    Basophils Absolute 01/20/2022 0 05  0 00 - 0 10 Thousands/µL Final    Sodium 01/20/2022 138  136 - 145 mmol/L Final    Potassium 01/20/2022 4 4  3 5 - 5 3 mmol/L Final    Chloride 01/20/2022 101  100 - 108 mmol/L Final    CO2 01/20/2022 28  21 - 32 mmol/L Final    ANION GAP 01/20/2022 9  4 - 13 mmol/L Final    BUN 01/20/2022 10  5 - 25 mg/dL Final    Creatinine 01/20/2022 0 92  0 60 - 1 30 mg/dL Final    Standardized to IDMS reference method    Glucose 01/20/2022 166* 65 - 140 mg/dL Final    If the patient is fasting, the ADA then defines impaired fasting glucose as > 100 mg/dL and diabetes as > or equal to 123 mg/dL  Specimen collection should occur prior to Sulfasalazine administration due to the potential for falsely depressed results  Specimen collection should occur prior to Sulfapyridine administration due to the potential for falsely elevated results   Calcium 01/20/2022 9 0  8 3 - 10 1 mg/dL Final    eGFR 01/20/2022 75  ml/min/1 73sq m Final    hs TnI 0hr 01/20/2022 3  "Refer to ACS Flowchart"- see link ng/L Final    Comment:                                              Initial (time 0) result  If >=50 ng/L, Myocardial injury suggested ;  Type of myocardial injury and treatment strategy  to be determined  If 5-49 ng/L, a delta result at 2 hours and or 4 hours will be needed to further evaluate  If <4 ng/L, and chest pain has been >3 hours since onset, patient may qualify for discharge based on the HEART score in the ED  If <5 ng/L and <3hours since onset of chest pain, a delta result at 2 hours will be needed to further evaluate  Second Troponin (time 2 hours)  If calculated delta >= 20 ng/L,  Myocardial injury suggested ; Type of myocardial injury and treatment strategy to be determined    If 5-49 ng/L and the calculated delta is 5-19 ng/L, consult medical service for evaluation  Continue evaluation for ischemia on ecg and other possible etiology and repeat hs troponin at 4 hours  If delta is <5 ng/L at 2 hours, consider discharge based on risk stratification via the HEART score (if in ED), or JUDY                            risk score in IP/Observation   Ventricular Rate 01/20/2022 92  BPM Final    Atrial Rate 01/20/2022 92  BPM Final    DE Interval 01/20/2022 158  ms Final    QRSD Interval 01/20/2022 84  ms Final    QT Interval 01/20/2022 366  ms Final    QTC Interval 01/20/2022 452  ms Final    P Axis 01/20/2022 69  degrees Final    QRS Axis 01/20/2022 12  degrees Final    T Wave [place] 01/20/2022 74  degrees Final       Imaging:    No relevant imaging to review     Please note: This report has been generated by a voice recognition software system  Therefore there may be syntax, spelling, and/or grammatical errors  Please call if you have any questions

## 2022-02-11 ENCOUNTER — CONSULT (OUTPATIENT)
Dept: HEMATOLOGY ONCOLOGY | Facility: CLINIC | Age: 47
End: 2022-02-11
Payer: COMMERCIAL

## 2022-02-11 VITALS
DIASTOLIC BLOOD PRESSURE: 98 MMHG | BODY MASS INDEX: 46.93 KG/M2 | TEMPERATURE: 97.9 F | RESPIRATION RATE: 17 BRPM | SYSTOLIC BLOOD PRESSURE: 140 MMHG | WEIGHT: 255 LBS | HEIGHT: 62 IN | HEART RATE: 100 BPM | OXYGEN SATURATION: 98 %

## 2022-02-11 DIAGNOSIS — R16.1 SPLENOMEGALY: ICD-10-CM

## 2022-02-11 DIAGNOSIS — N92.0 MENORRHAGIA WITH REGULAR CYCLE: ICD-10-CM

## 2022-02-11 DIAGNOSIS — D72.829 LEUKOCYTOSIS, UNSPECIFIED TYPE: Primary | ICD-10-CM

## 2022-02-11 DIAGNOSIS — K58.0 IRRITABLE BOWEL SYNDROME WITH DIARRHEA: ICD-10-CM

## 2022-02-11 DIAGNOSIS — R53.82 CHRONIC FATIGUE: ICD-10-CM

## 2022-02-11 DIAGNOSIS — R71.8 ABNORMALITY OF RED BLOOD CELLS: ICD-10-CM

## 2022-02-11 PROCEDURE — 99204 OFFICE O/P NEW MOD 45 MIN: CPT

## 2022-02-11 NOTE — LETTER
February 11, 2022     Patient: Schuyler Lesch   YOB: 1975   Date of Visit: 2/11/2022       To Whom it May Concern:    Caroline Jordan is under my professional care  She was seen in my office on 2/11/2022  She may return to work on 2/12/22  Due to her potential diagnosis she experiences more fatigue that the usual person and may arrive to work later than expected on occasion  If you have any questions or concerns, please don't hesitate to call           Sincerely,          EVITA Cedillo        CC: No Recipients

## 2022-02-22 ENCOUNTER — HOSPITAL ENCOUNTER (OUTPATIENT)
Dept: ULTRASOUND IMAGING | Facility: HOSPITAL | Age: 47
Discharge: HOME | End: 2022-02-22
Payer: COMMERCIAL

## 2022-02-22 DIAGNOSIS — R16.1 SPLENOMEGALY: ICD-10-CM

## 2022-02-22 DIAGNOSIS — D72.829 LEUKOCYTOSIS, UNSPECIFIED TYPE: ICD-10-CM

## 2022-02-22 PROCEDURE — 76705 ECHO EXAM OF ABDOMEN: CPT

## 2022-02-24 ENCOUNTER — TELEPHONE (OUTPATIENT)
Dept: HEMATOLOGY ONCOLOGY | Facility: CLINIC | Age: 47
End: 2022-02-24

## 2022-02-24 NOTE — TELEPHONE ENCOUNTER
Left detailed message letting patient know blood work ordered by Danilo Ng does not require an authorization and [facility] [id number]  Explained patient needs to complete this blood work prior to next appointment on 3/4  I asked that she call back with any questions or concerns

## 2022-02-25 ENCOUNTER — OFFICE VISIT (OUTPATIENT)
Dept: INTERNAL MEDICINE CLINIC | Facility: CLINIC | Age: 47
End: 2022-02-25
Payer: COMMERCIAL

## 2022-02-25 ENCOUNTER — APPOINTMENT (OUTPATIENT)
Dept: LAB | Facility: CLINIC | Age: 47
End: 2022-02-25
Payer: COMMERCIAL

## 2022-02-25 VITALS
TEMPERATURE: 98.6 F | HEART RATE: 103 BPM | WEIGHT: 251 LBS | BODY MASS INDEX: 46.19 KG/M2 | SYSTOLIC BLOOD PRESSURE: 124 MMHG | DIASTOLIC BLOOD PRESSURE: 72 MMHG | HEIGHT: 62 IN | OXYGEN SATURATION: 97 %

## 2022-02-25 DIAGNOSIS — R53.82 CHRONIC FATIGUE: ICD-10-CM

## 2022-02-25 DIAGNOSIS — E11.9 TYPE 2 DIABETES MELLITUS WITHOUT COMPLICATION, WITHOUT LONG-TERM CURRENT USE OF INSULIN (HCC): Primary | ICD-10-CM

## 2022-02-25 DIAGNOSIS — R71.8 ABNORMALITY OF RED BLOOD CELLS: ICD-10-CM

## 2022-02-25 DIAGNOSIS — E11.9 TYPE 2 DIABETES MELLITUS WITHOUT COMPLICATION, WITHOUT LONG-TERM CURRENT USE OF INSULIN (HCC): ICD-10-CM

## 2022-02-25 DIAGNOSIS — D72.829 LEUKOCYTOSIS, UNSPECIFIED TYPE: ICD-10-CM

## 2022-02-25 DIAGNOSIS — M79.7 FIBROMYALGIA: ICD-10-CM

## 2022-02-25 DIAGNOSIS — F41.1 GAD (GENERALIZED ANXIETY DISORDER): ICD-10-CM

## 2022-02-25 DIAGNOSIS — N92.0 MENORRHAGIA WITH REGULAR CYCLE: ICD-10-CM

## 2022-02-25 PROBLEM — R73.03 PREDIABETES: Status: RESOLVED | Noted: 2018-07-16 | Resolved: 2022-02-25

## 2022-02-25 LAB
ERYTHROCYTE [DISTWIDTH] IN BLOOD BY AUTOMATED COUNT: 14.8 % (ref 11.6–15.1)
EST. AVERAGE GLUCOSE BLD GHB EST-MCNC: 146 MG/DL
FERRITIN SERPL-MCNC: 21 NG/ML (ref 8–388)
HBA1C MFR BLD: 6.7 %
HCT VFR BLD AUTO: 40.9 % (ref 34.8–46.1)
HGB BLD-MCNC: 13.1 G/DL (ref 11.5–15.4)
IRON SATN MFR SERPL: 13 % (ref 15–50)
IRON SERPL-MCNC: 52 UG/DL (ref 50–170)
MCH RBC QN AUTO: 26.3 PG (ref 26.8–34.3)
MCHC RBC AUTO-ENTMCNC: 32 G/DL (ref 31.4–37.4)
MCV RBC AUTO: 82 FL (ref 82–98)
NRBC BLD AUTO-RTO: 0 /100 WBCS
PLATELET # BLD AUTO: 434 THOUSANDS/UL (ref 149–390)
PMV BLD AUTO: 9.8 FL (ref 8.9–12.7)
RBC # BLD AUTO: 4.99 MILLION/UL (ref 3.81–5.12)
TIBC SERPL-MCNC: 393 UG/DL (ref 250–450)
WBC # BLD AUTO: 12.08 THOUSAND/UL (ref 4.31–10.16)

## 2022-02-25 PROCEDURE — 88374 M/PHMTRC ALYS ISHQUANT/SEMIQ: CPT

## 2022-02-25 PROCEDURE — 85007 BL SMEAR W/DIFF WBC COUNT: CPT

## 2022-02-25 PROCEDURE — 83550 IRON BINDING TEST: CPT

## 2022-02-25 PROCEDURE — 83540 ASSAY OF IRON: CPT

## 2022-02-25 PROCEDURE — 85027 COMPLETE CBC AUTOMATED: CPT

## 2022-02-25 PROCEDURE — 3044F HG A1C LEVEL LT 7.0%: CPT | Performed by: FAMILY MEDICINE

## 2022-02-25 PROCEDURE — 81404 MOPATH PROCEDURE LEVEL 5: CPT

## 2022-02-25 PROCEDURE — 36415 COLL VENOUS BLD VENIPUNCTURE: CPT

## 2022-02-25 PROCEDURE — 82728 ASSAY OF FERRITIN: CPT

## 2022-02-25 PROCEDURE — 83020 HEMOGLOBIN ELECTROPHORESIS: CPT

## 2022-02-25 PROCEDURE — 3008F BODY MASS INDEX DOCD: CPT | Performed by: FAMILY MEDICINE

## 2022-02-25 PROCEDURE — 83036 HEMOGLOBIN GLYCOSYLATED A1C: CPT

## 2022-02-25 PROCEDURE — 99214 OFFICE O/P EST MOD 30 MIN: CPT | Performed by: FAMILY MEDICINE

## 2022-02-25 RX ORDER — GLYBURIDE 2.5 MG/1
2.5 TABLET ORAL
Qty: 30 TABLET | Refills: 1 | Status: SHIPPED | OUTPATIENT
Start: 2022-02-25 | End: 2022-05-21

## 2022-02-25 RX ORDER — DULOXETIN HYDROCHLORIDE 60 MG/1
120 CAPSULE, DELAYED RELEASE ORAL DAILY
Qty: 60 CAPSULE | Refills: 0 | Status: SHIPPED | OUTPATIENT
Start: 2022-02-25 | End: 2022-03-27

## 2022-02-25 RX ORDER — GABAPENTIN 300 MG/1
CAPSULE ORAL
Qty: 159 CAPSULE | Refills: 0 | Status: SHIPPED | OUTPATIENT
Start: 2022-02-25 | End: 2022-04-04

## 2022-02-25 NOTE — PROGRESS NOTES
FOLLOW-UP OFFICE VISIT  [facility] - [facility]    NAME: Isaiah Sanders  AGE: 40 y o  SEX: female  : 1975     DATE: 2022     Assessment and Plan:     Problem List Items Addressed This Visit        Endocrine    Type 2 diabetes mellitus without complication ([address] ) - Primary    Relevant Medications    glyBURIDE (DIABETA) 2 5 mg tablet    Other Relevant Orders    HEMOGLOBIN A1C W/ EAG ESTIMATION (Completed)       Other    Fibromyalgia    Relevant Medications    gabapentin (NEURONTIN) 300 mg capsule    DULoxetine (CYMBALTA) 60 mg delayed release capsule    Chronic fatigue    Relevant Orders    Acetylcholine receptor, blocking    Acetylcholine receptor, binding    MUSK Antibody      Other Visit Diagnoses     MAIDA (generalized anxiety disorder)        Relevant Medications    DULoxetine (CYMBALTA) 60 mg delayed release capsule      will obtain UTD a1c to determine if current glyburide therapy should be adjusted  Pt encouraged to check AM fasting BG for at home monitoring  Will increase gabapentin and Cymbalta for worsening symptoms  Pt denies work up for MG  antibodies ordered to r/o secondary causes for her fibro and chronic fatigue  Pt encouraged to seek therapy and counseling for her anxiety as well  Will not add an additonal anxiolytic at this time  BMI Counseling: Body mass index is 45 91 kg/m²  The BMI is above normal  Nutrition recommendations include encouraging healthy choices of fruits and vegetables, limiting drinks that contain sugar, increasing intake of lean protein and reducing intake of cholesterol  Exercise recommendations include exercising 3-5 times per week  Rationale for BMI follow-up plan is due to patient being overweight or obese  Return in about 3 weeks (around 3/18/2022) for Next scheduled follow up       Chief Complaint:     Chief Complaint   Patient presents with    Follow-up     4 weeks and patient stated she is in extreme pain still and she is lethargic andshe found out she has a blood disorder which is effecting her anemia at the hematologist         History of Present Illness:     Fatigue and full body pain is worsening  Taking Cymbalta but not gabapentin  Very frustrated by lack of energy is affecting her job  She having more brain fog  Is exhausted at the end of the day to the point she sleeps for hours  Admits that her symptoms have worsened since seeing hematology  Anxious about the prospect of a bone marrow biopsy  Hasn't re-established with rheumatology yet  Review of Systems:     Review of Systems   Constitutional: Positive for fatigue  Musculoskeletal: Positive for arthralgias and myalgias  Psychiatric/Behavioral: The patient is nervous/anxious  Problem List:     Patient Active Problem List   Diagnosis    Current moderate episode of major depressive disorder ([address] )    Irritable bowel syndrome    Fibromyalgia    Morbid obesity with BMI of 40 0-44 9, adult (HCC)    Chronic fatigue    Skin lesion    Fatty liver    Arthralgia of multiple sites    Chronic bilateral low back pain with bilateral sciatica    Type 2 diabetes mellitus without complication (HCC)    Pure hypercholesterolemia        Objective:     /72 (BP Location: Left arm, Patient Position: Sitting, Cuff Size: Standard) Comment: bp  Pulse 103   Temp 98 6 °F (37 °C) (Temporal) Comment: NO NSAIDS  Ht 5' 2" (1 575 m)   Wt 114 kg (251 lb)   SpO2 97%   BMI 45 91 kg/m²     Physical Exam  Cardiovascular:      Pulses: no weak pulses          Dorsalis pedis pulses are 2+ on the right side and 2+ on the left side  Posterior tibial pulses are 2+ on the right side and 2+ on the left side  Feet:      Right foot:      Skin integrity: No ulcer, skin breakdown, erythema, warmth, callus or dry skin  Left foot:      Skin integrity: No ulcer, skin breakdown, erythema, warmth, callus or dry skin          Patient's shoes and socks removed  Right Foot/Ankle   Right Foot Inspection  Skin Exam: skin normal and skin intact  No dry skin, no warmth, no callus, no erythema, no maceration, no abnormal color, no pre-ulcer, no ulcer and no callus  Sensory   Proprioception: intact  Monofilament testing: intact    Vascular  Capillary refills: < 3 seconds  The right DP pulse is 2+  The right PT pulse is 2+  Left Foot/Ankle  Left Foot Inspection  Skin Exam: skin normal and skin intact  No dry skin, no warmth, no erythema, no maceration, normal color, no pre-ulcer, no ulcer and no callus  Sensory   Proprioception: intact  Monofilament testing: intact    Vascular  Capillary refills: < 3 seconds  The left DP pulse is 2+  The left PT pulse is 2+  Assign Risk Category  No deformity present  No loss of protective sensation  No weak pulses  Risk: 0        Pertinent Laboratory/Diagnostic Studies:    Laboratory Results: I have personally reviewed the pertinent laboratory results/reports     CBC:   Results from Last 12 Months   Lab Units 02/25/22  1623 01/21/22  1323 01/21/22  1323   WBC Thousand/uL 12 08*   < > 12 18*   RBC Million/uL 4 99   < > 4 87   HEMOGLOBIN g/dL 13 1   < > 12 6   HEMATOCRIT % 40 9   < > 40 6   MCV fL 82   < > 83   MCH pg 26 3*   < > 25 9*   MCHC g/dL 32 0   < > 31 0*   RDW % 14 8   < > 15 4*   MPV fL 9 8   < > 10 2   PLATELETS Thousands/uL 434*   < > 369   NRBC AUTO /100 WBCs 0   < > 0   NEUTROS PCT %  --   --  74   LYMPHS PCT %  --   --  17   MONOS PCT %  --   --  6   EOS PCT % 2  --  2   BASOS PCT %  --   --  0   NEUTROS ABS Thousands/µL  --   --  9 08*   LYMPHS ABS Thousands/µL  --   --  2 09   MONOS ABS Thousand/µL  --   --  0 71   EOS ABS Thousand/µL  --   --  0 18    < > = values in this interval not displayed       Endocrine Studies:   Results from Last 12 Months   Lab Units 02/25/22  1623 01/21/22  1323 01/21/22  1323   HEMOGLOBIN A1C % 6 7*   < > 7 3*   TSH 3RD GENERATON uIU/mL  --   --  2 230   TRIGLYCERIDES mg/dL  --   --  310*   CHOLESTEROL mg/dL  --   --  232*   HDL mg/dL  --   --  32*   LDL CALC mg/dL  --   --  138*   VIT D 25 HYDROXY ng/mL  --   --  17 8*    < > = values in this interval not displayed             Karthikeyan LAMB HSPTLDella Coupe [facility]  2/28/2022 8:44 AM

## 2022-02-25 NOTE — PATIENT INSTRUCTIONS
Start taking 300mg tablets of gabapentin 3 times a day  In one week start taking 2 tablets (600mg) three times a day   Start taking 120mg of the Cymbalta ( 2 tablets )  Staying on the glyburide as is  Make sure you get A1C done before I see you next

## 2022-02-27 LAB
ANISOCYTOSIS BLD QL SMEAR: PRESENT
IMM EOSINOPHIL NFR BLD MANUAL: 2 % (ref 0–6)
LYMPHOCYTES NFR BLD: 10 % (ref 14–44)
METAMYELOCYTES NFR BLD MANUAL: 1 % (ref 0–1)
MONOCYTES NFR BLD AUTO: 2 % (ref 4–12)
MYELOCYTES NFR BLD: 1 % (ref 0–1)
NEUTS BAND NFR BLD MANUAL: 7 THOUSAND/UL
NEUTS SEG NFR BLD AUTO: 77 % (ref 45–77)
PATHOLOGIST INTERPRETATION: NORMAL
PLATELET BLD QL SMEAR: ABNORMAL
POLYCHROMASIA BLD QL SMEAR: PRESENT
TOTAL CELLS COUNTED SPEC: 100

## 2022-02-28 ENCOUNTER — TELEPHONE (OUTPATIENT)
Dept: INTERNAL MEDICINE CLINIC | Facility: CLINIC | Age: 47
End: 2022-02-28

## 2022-02-28 NOTE — TELEPHONE ENCOUNTER
----- Message from Sam Winchester DO sent at 2/28/2022  8:47 AM EST -----  Please notify pt that her a1c has improved to 6 7   She can continue taking glyburide at current dose

## 2022-03-03 ENCOUNTER — TELEPHONE (OUTPATIENT)
Dept: HEMATOLOGY ONCOLOGY | Facility: CLINIC | Age: 47
End: 2022-03-03

## 2022-03-03 DIAGNOSIS — D72.829 LEUKOCYTOSIS, UNSPECIFIED TYPE: Primary | ICD-10-CM

## 2022-03-03 DIAGNOSIS — R71.8 ABNORMALITY OF RED BLOOD CELLS: ICD-10-CM

## 2022-03-03 NOTE — TELEPHONE ENCOUNTER
Called and spoke with patient  Appointment scheduled for tomorrow changed to 3/11 at 3pm d/t labs still in process  She is aware to complete BCR/ABL blood work next week prior to appointment

## 2022-03-04 LAB — MISCELLANEOUS LAB TEST RESULT: NORMAL

## 2022-03-07 ENCOUNTER — APPOINTMENT (OUTPATIENT)
Dept: LAB | Facility: CLINIC | Age: 47
End: 2022-03-07
Payer: COMMERCIAL

## 2022-03-07 DIAGNOSIS — D72.829 LEUKOCYTOSIS, UNSPECIFIED TYPE: ICD-10-CM

## 2022-03-07 DIAGNOSIS — R71.8 ABNORMALITY OF RED BLOOD CELLS: ICD-10-CM

## 2022-03-07 DIAGNOSIS — R53.82 CHRONIC FATIGUE: ICD-10-CM

## 2022-03-07 PROCEDURE — 81207 BCR/ABL1 GENE MINOR BP: CPT

## 2022-03-07 PROCEDURE — 81206 BCR/ABL1 GENE MAJOR BP: CPT

## 2022-03-07 PROCEDURE — 83519 RIA NONANTIBODY: CPT

## 2022-03-07 PROCEDURE — 36415 COLL VENOUS BLD VENIPUNCTURE: CPT

## 2022-03-08 LAB — HBB GENE MUT ANL BLD/T: NORMAL

## 2022-03-09 LAB — ACHR BIND AB SER-SCNC: <0.03 NMOL/L (ref 0–0.24)

## 2022-03-10 ENCOUNTER — TELEPHONE (OUTPATIENT)
Dept: HEMATOLOGY ONCOLOGY | Facility: CLINIC | Age: 47
End: 2022-03-10

## 2022-03-10 LAB — ACHR BLOCK AB/ACHR TOTAL SFR SER: 21 % (ref 0–25)

## 2022-03-10 NOTE — TELEPHONE ENCOUNTER
LVM for patient that we needed to reschedule 3/11/22 appt with Olinda Gallardo due to lab work not returning in time  Advised patient on voicemail that appt was rescheduled for 3/18/22 at 11:30am and if she had scheduling conflicts to contact the office

## 2022-03-11 ENCOUNTER — TELEPHONE (OUTPATIENT)
Dept: INTERNAL MEDICINE CLINIC | Facility: CLINIC | Age: 47
End: 2022-03-11

## 2022-03-11 LAB
HGB A MFR BLD: 2.6 % (ref 1.8–3.2)
HGB A MFR BLD: 97.4 % (ref 96.4–98.8)
HGB F MFR BLD: 0 % (ref 0–2)
HGB FRACT BLD-IMP: NORMAL
HGB S MFR BLD: 0 %
SCAN RESULT: NORMAL

## 2022-03-11 NOTE — TELEPHONE ENCOUNTER
----- Message from Jaycob Alexandre DO sent at 3/11/2022  1:39 PM EST -----  Please notify pt that her blood work was negative for possible myasthenia gravis

## 2022-03-15 ENCOUNTER — TELEPHONE (OUTPATIENT)
Dept: LAB | Facility: HOSPITAL | Age: 47
End: 2022-03-15

## 2022-03-16 ENCOUNTER — APPOINTMENT (OUTPATIENT)
Dept: LAB | Facility: CLINIC | Age: 47
End: 2022-03-16
Payer: COMMERCIAL

## 2022-03-16 ENCOUNTER — TELEPHONE (OUTPATIENT)
Dept: HEMATOLOGY ONCOLOGY | Facility: CLINIC | Age: 47
End: 2022-03-16

## 2022-03-16 DIAGNOSIS — D72.829 LEUKOCYTOSIS, UNSPECIFIED TYPE: ICD-10-CM

## 2022-03-16 DIAGNOSIS — R71.8 ABNORMALITY OF RED BLOOD CELLS: ICD-10-CM

## 2022-03-16 PROCEDURE — 81270 JAK2 GENE: CPT

## 2022-03-16 PROCEDURE — 81219 CALR GENE COM VARIANTS: CPT

## 2022-03-16 PROCEDURE — 88185 FLOWCYTOMETRY/TC ADD-ON: CPT

## 2022-03-16 PROCEDURE — 88184 FLOWCYTOMETRY/ TC 1 MARKER: CPT

## 2022-03-16 PROCEDURE — 36415 COLL VENOUS BLD VENIPUNCTURE: CPT

## 2022-03-16 NOTE — TELEPHONE ENCOUNTER
Called and spoke with patient  She is aware when labs were drawn on 2/25, there was not enough specimen for leukemia/lymphoma flow cytometry, CALR, and JAK2  She completed the redraw this morning  Will cancel follow up appointment for this Friday with Juan Antonio Hunter and reschedule once results are back  Patient verbalized understanding

## 2022-03-17 ENCOUNTER — TELEPHONE (OUTPATIENT)
Dept: INTERNAL MEDICINE CLINIC | Facility: CLINIC | Age: 47
End: 2022-03-17

## 2022-03-17 LAB — MUSK AB SER IA-ACNC: <1 U/ML

## 2022-03-17 NOTE — TELEPHONE ENCOUNTER
----- Message from Luis Lopez DO sent at 3/17/2022  8:23 AM EDT -----  Please notify pt that the last antibody test for myasthenia gravis came back negative   She has no evidence of that disease

## 2022-03-18 ENCOUNTER — OFFICE VISIT (OUTPATIENT)
Dept: INTERNAL MEDICINE CLINIC | Facility: CLINIC | Age: 47
End: 2022-03-18
Payer: COMMERCIAL

## 2022-03-18 VITALS
BODY MASS INDEX: 41.01 KG/M2 | WEIGHT: 255.2 LBS | SYSTOLIC BLOOD PRESSURE: 120 MMHG | HEART RATE: 94 BPM | DIASTOLIC BLOOD PRESSURE: 76 MMHG | OXYGEN SATURATION: 96 % | HEIGHT: 66 IN | TEMPERATURE: 98.4 F

## 2022-03-18 DIAGNOSIS — F41.9 ANXIETY: ICD-10-CM

## 2022-03-18 DIAGNOSIS — F32.A DEPRESSION, UNSPECIFIED DEPRESSION TYPE: Primary | ICD-10-CM

## 2022-03-18 DIAGNOSIS — E11.9 TYPE 2 DIABETES MELLITUS WITHOUT COMPLICATION, WITHOUT LONG-TERM CURRENT USE OF INSULIN (HCC): ICD-10-CM

## 2022-03-18 DIAGNOSIS — F33.9 DEPRESSION, RECURRENT (HCC): ICD-10-CM

## 2022-03-18 PROCEDURE — 99214 OFFICE O/P EST MOD 30 MIN: CPT

## 2022-03-18 PROCEDURE — 3725F SCREEN DEPRESSION PERFORMED: CPT

## 2022-03-18 RX ORDER — MULTIVIT-MIN/IRON/FOLIC ACID/K 18-600-40
CAPSULE ORAL
COMMUNITY
End: 2022-04-26 | Stop reason: ALTCHOICE

## 2022-03-18 NOTE — PATIENT INSTRUCTIONS
[facility]ent[facility] Timer (meditation leann)  [place]    Relaxation and Meditation   WHAT YOU NEED TO KNOW:   What do I need to know about relaxation and meditation? Relaxation and meditation can help decrease pain, stress, and anxiety  Relaxation and meditation also help regulate your breathing and decrease your blood pressure and heartbeat  What are some types of relaxation? · Slow, deep breathing  can help relax your body and mind  Deep breathing can be done at any time  · Progressive muscle relaxation  decreases tense muscles  With this therapy, you relax certain muscle groups until your entire body is relaxed  Start at one end of your body and move to the other end, such as from your feet to your head  · Autogenic training, or self-control relaxation , helps increase the blood flow to your limbs and helps you sleep  With this therapy, you try to replace painful or uncomfortable thoughts and feelings with pleasant ones  · Guided imagery  uses your imagination to help you feel peaceful and calm  You try to see, hear, smell, and taste things that you picture in your mind  For example, you might imagine lying on a beach, feeling the sand, and hearing the waves  · Distraction  uses activities you enjoy to help you take your mind off of pain, stress, or anxiety  Distraction may include, listening to music, painting, reading a book, or exercise  What are some types of meditation? Meditation is a mind exercise that helps to relax your body and free your mind of worry  You sit quietly in a comfortable position, close your eyes, and relax your muscles  Your thoughts are relaxed while your body stays alert  Meditation can be done alone or with other people  · Mantra meditation  is when you think or speak a certain word or phrase over and over  The word or phrase often has a smooth sound  The mantra is used as a way to help you focus   The sound is believed to make vibrations that have different effects on people  · Mindfulness meditation  is when you focus on what is happening in your life at that point in time  You become aware of your thoughts and feelings in the present without making any judgment  You learn not to worry about your past and future  CARE AGREEMENT:   You have the right to help plan your care  Learn about your health condition and how it may be treated  Discuss treatment options with your healthcare providers to decide what care you want to receive  You always have the right to refuse treatment  The above information is an  only  It is not intended as medical advice for individual conditions or treatments  Talk to your doctor, nurse or pharmacist before following any medical regimen to see if it is safe and effective for you  © Copyright [employer] 2022 Information is for End User's use only and may not be sold, redistributed or otherwise used for commercial purposes   All illustrations and images included in CareNotes® are the copyrighted property of [employer]  or [address] [place] [address] [employer]

## 2022-03-21 LAB — SCAN RESULT: NORMAL

## 2022-03-21 NOTE — ASSESSMENT & PLAN NOTE
Lab Results   Component Value Date    HGBA1C 6 7 (H) 02/25/2022     Hemoglobin A1c is 6 7 today continue current medications

## 2022-03-21 NOTE — PROGRESS NOTES
[facility] - [facility]    NAME: Sylvie Cranker  AGE: 40 y o  SEX: female  : 1975     DATE: 3/20/2022     Assessment and Plan:     Problem List Items Addressed This Visit        Other    Depression, recurrent ([address] )     Rennis He is tearful in the office today  She states she is feeling overwhelmed with her current medical situation, waiting on test results, increased is fatigue, being a single mother, and her job demands  She states that she is having difficulty concentrating and focusing on her work  She states she overslept once last week  She finds herself falling asleep easily  She has been waiting on lab results that her hematologist ordered  She met with her union rep this morning who recommended she apply for [place]  A meeting with her today I agree that she would benefit from [facility] time  I explained her this would give her time to get her medical testing results back  She may need future appointments based on her lab results  Also we discussed meeting with a behavioral therapist today  She is agreeable to this and refer her to Chasidy Hammond in our office  We also discussed nonpharmacological management of anxiety and depression  I gave her a [employer] referral code and discussed the program with her  We also discussed meditation and breathing exercises  Will follow-up with her in 3 weeks  Other Visit Diagnoses     Depression, unspecified depression type    -  Primary    Relevant Orders    Ambulatory Referral to Behavioral Health Therapists    Anxiety        Relevant Orders    Ambulatory Referral to [facility]              Return in about 3 weeks (around 2022) for Recheck       Chief Complaint:     Chief Complaint   Patient presents with    Follow-up     fatigued, hip lower back and leg pain, mon or tues sugar spike to 190        History of Present Illness:     Sylvie Cranker presents to the office today for a follow up visit for the following:  Review of labs, fatigue, anxiety, difficulty concentrating        Patient had blood work prior to today's appointment which was reviewed with them  Recent Results (from the past 672 hour(s))   MISCELLANEOUS LAB TEST    Collection Time: 02/25/22  3:14 PM   Result Value Ref Range    Miscellaneous Lab Test Result SEE WRITTEN REPORT    CBC and differential    Collection Time: 02/25/22  4:23 PM   Result Value Ref Range    WBC 12 08 (H) 4 31 - 10 16 Thousand/uL    RBC 4 99 3 81 - 5 12 Million/uL    Hemoglobin 13 1 11 5 - 15 4 g/dL    Hematocrit 40 9 34 8 - 46 1 %    MCV 82 82 - 98 fL    MCH 26 3 (L) 26 8 - 34 3 pg    MCHC 32 0 31 4 - 37 4 g/dL    RDW 14 8 11 6 - 15 1 %    MPV 9 8 8 9 - 12 7 fL    Platelets 186 (H) 587 - 390 Thousands/uL    nRBC 0 /100 WBCs   Peripheral Smear    Collection Time: 02/25/22  4:23 PM   Result Value Ref Range    Segmented Neutrophils Manual 77 45 - 77 %    Bands Manual 7 Thousand/uL    Lymphocytes Manual 10 (L) 14 - 44 %    Monocytes Manual 2 (L) 4 - 12 %    Eosinophils Manual 2 0 - 6 %    Metamyelocytes 1 0 - 1 %    MYELOCYTE -MAN DIFF 1 0 - 1 %    Total Counted 100     RBC Morphology      Polychromasia Present     Anisocytosis Present     Platelet Estimate Increased (A) Adequate   Gene test beta-thalassemia    Collection Time: 02/25/22  4:23 PM   Result Value Ref Range    ROUTING (BETA-THALASSEMIA) Comment    HEMOGLOBIN A1C W/ EAG ESTIMATION    Collection Time: 02/25/22  4:23 PM   Result Value Ref Range    Hemoglobin A1C 6 7 (H) Normal 3 8-5 6%; PreDiabetic 5 7-6 4%;  Diabetic >=6 5%; Glycemic control for adults with diabetes <7 0% %     mg/dl   Iron Saturation %    Collection Time: 02/25/22  4:23 PM   Result Value Ref Range    Iron Saturation 13 (L) 15 - 50 %    TIBC 393 250 - 450 ug/dL    Iron 52 50 - 170 ug/dL   Ferritin    Collection Time: 02/25/22  4:23 PM   Result Value Ref Range    Ferritin 21 8 - 388 ng/mL   Path Slide Review    Collection Time: 02/25/22  4:23 PM   Result Value Ref Range    Path Review       Leukocytosis with neutrophilia  Mild polychromasia of red blood cells with extremely rare dacrocyte identified  Suggest clinical correlation with ongoing hematology-oncology work-up and results  Hgb Fractionation Cascade    Collection Time: 02/25/22  4:38 PM   Result Value Ref Range    Hgb F Quant 0 0 0 0 - 2 0 %    Hgb A 97 4 96 4 - 98 8 %    Hgb S Quant 0 0 0 0 %    Hgb A2 Quant 2 6 1 8 - 3 2 %    Hgb Interp  Comment    BCR/ABL, PCR    Collection Time: 03/07/22 10:40 AM   Result Value Ref Range    Scan Result SEE WRITTEN REPORT    Acetylcholine receptor, blocking    Collection Time: 03/07/22 10:40 AM   Result Value Ref Range    AChR Blocking Abs, Serum 21 0 - 25 %   Acetylcholine receptor, binding    Collection Time: 03/07/22 10:40 AM   Result Value Ref Range    AChR Binding Ab, Serum <0 03 0 00 - 0 24 nmol/L   MUSK Antibody    Collection Time: 03/07/22 10:40 AM   Result Value Ref Range    MUSK ANTIBODY <1 0 U/mL              Review of Systems:     Review of Systems   Constitutional: Positive for fatigue  Respiratory: Negative  Cardiovascular: Negative  Gastrointestinal: Negative  Musculoskeletal: Positive for arthralgias and myalgias  Skin: Negative  Psychiatric/Behavioral: Positive for decreased concentration and sleep disturbance  The patient is nervous/anxious           Problem List:     Patient Active Problem List   Diagnosis    Current moderate episode of major depressive disorder (HCC)    Irritable bowel syndrome    Fibromyalgia    Morbid obesity with BMI of 40 0-44 9, adult (HCC)    Chronic fatigue    Skin lesion    Fatty liver    Arthralgia of multiple sites    Chronic bilateral low back pain with bilateral sciatica    Type 2 diabetes mellitus without complication ([facility] [address]    Pure hypercholesterolemia    Depression, recurrent (HCC)        Objective:     /76   Pulse 94   Temp 98 4 °F (36 9 °C)   Ht 5' 5 5" (1 664 m)   Wt 116 kg (255 lb 3 2 oz)   SpO2 96%   BMI 41 82 kg/m²     Physical Exam  Constitutional:       General: She is not in acute distress  Appearance: She is obese  HENT:      Head: Normocephalic and atraumatic  Right Ear: External ear normal       Left Ear: External ear normal       Nose: Nose normal       Mouth/Throat:      Mouth: Mucous membranes are moist       Pharynx: Oropharynx is clear  Eyes:      Conjunctiva/sclera: Conjunctivae normal    Cardiovascular:      Rate and Rhythm: Normal rate and regular rhythm  Pulses: Normal pulses  Heart sounds: Normal heart sounds  No murmur heard  Pulmonary:      Effort: Pulmonary effort is normal       Breath sounds: Normal breath sounds  No wheezing  Musculoskeletal:         General: Normal range of motion  Cervical back: Neck supple  Skin:     General: Skin is warm and dry  Capillary Refill: Capillary refill takes less than 2 seconds  Neurological:      Mental Status: She is alert and oriented to person, place, and time  Psychiatric:         Mood and Affect: Mood is anxious  Affect is tearful  Behavior: Behavior normal          Thought Content: Thought content normal          Judgment: Judgment normal          I spent 20 minutes with this patient      EVITA Lowry  [facility]

## 2022-03-21 NOTE — ASSESSMENT & PLAN NOTE
[facility] is tearful in the office today  She states she is feeling overwhelmed with her current medical situation, waiting on test results, increased is fatigue, being a single mother, and her job demands  She states that she is having difficulty concentrating and focusing on her work  She states she overslept once last week  She finds herself falling asleep easily  She has been waiting on lab results that her hematologist ordered  She met with her union rep this morning who recommended she apply for FMLA  A meeting with her today I agree that she would benefit from [facility] time  I explained her this would give her time to get her medical testing results back  She may need future appointments based on her lab results  Also we discussed meeting with a behavioral therapist today  She is agreeable to this and refer her to Sánchez Krishnan in our office  We also discussed nonpharmacological management of anxiety and depression  I gave her a [employer] referral code and discussed the program with her  We also discussed meditation and breathing exercises  Will follow-up with her in 3 weeks

## 2022-03-27 DIAGNOSIS — M79.7 FIBROMYALGIA: ICD-10-CM

## 2022-03-27 RX ORDER — DULOXETIN HYDROCHLORIDE 60 MG/1
CAPSULE, DELAYED RELEASE ORAL
Qty: 60 CAPSULE | Refills: 0 | Status: SHIPPED | OUTPATIENT
Start: 2022-03-27 | End: 2022-04-08

## 2022-03-30 ENCOUNTER — OFFICE VISIT (OUTPATIENT)
Dept: HEMATOLOGY ONCOLOGY | Facility: CLINIC | Age: 47
End: 2022-03-30
Payer: COMMERCIAL

## 2022-03-30 ENCOUNTER — APPOINTMENT (OUTPATIENT)
Dept: LAB | Facility: CLINIC | Age: 47
End: 2022-03-30
Payer: COMMERCIAL

## 2022-03-30 VITALS
HEIGHT: 66 IN | TEMPERATURE: 97.3 F | WEIGHT: 253 LBS | HEART RATE: 106 BPM | RESPIRATION RATE: 16 BRPM | SYSTOLIC BLOOD PRESSURE: 136 MMHG | DIASTOLIC BLOOD PRESSURE: 88 MMHG | OXYGEN SATURATION: 95 % | BODY MASS INDEX: 40.66 KG/M2

## 2022-03-30 DIAGNOSIS — D72.829 LEUKOCYTOSIS, UNSPECIFIED TYPE: Primary | ICD-10-CM

## 2022-03-30 DIAGNOSIS — F33.9 DEPRESSION, RECURRENT (HCC): ICD-10-CM

## 2022-03-30 DIAGNOSIS — R79.0 LOW FERRITIN LEVEL: ICD-10-CM

## 2022-03-30 DIAGNOSIS — R53.82 CHRONIC FATIGUE: ICD-10-CM

## 2022-03-30 DIAGNOSIS — D75.839 THROMBOCYTOSIS: ICD-10-CM

## 2022-03-30 DIAGNOSIS — R71.8 ABNORMALITY OF RED BLOOD CELLS: ICD-10-CM

## 2022-03-30 DIAGNOSIS — D72.829 LEUKOCYTOSIS, UNSPECIFIED TYPE: ICD-10-CM

## 2022-03-30 LAB — VIT B12 SERPL-MCNC: 273 PG/ML (ref 100–900)

## 2022-03-30 PROCEDURE — 3008F BODY MASS INDEX DOCD: CPT

## 2022-03-30 PROCEDURE — 36415 COLL VENOUS BLD VENIPUNCTURE: CPT

## 2022-03-30 PROCEDURE — 99215 OFFICE O/P EST HI 40 MIN: CPT

## 2022-03-30 PROCEDURE — 83918 ORGANIC ACIDS TOTAL QUANT: CPT

## 2022-03-30 PROCEDURE — 82607 VITAMIN B-12: CPT

## 2022-03-30 NOTE — LETTER
March 31, 2022     Patient: Taryn Duvall   YOB: 1975   Date of Visit: 3/30/2022       To Whom it May Concern:    Simone Bryson is under my professional care  She was seen in my office on 3/30/2022  She has multiple tests and follow-up appointments coming up in the near future that will require Darrelyn Drones to take days off for  If you have any questions or concerns, please don't hesitate to call           Sincerely,          EVITA Ernst        CC: No Recipients

## 2022-03-30 NOTE — PROGRESS NOTES
[address] [facility] [id number]  Hematology Ambulatory Follow-Up  Norah Howard, 1975, [id number]  3/30/2022    Assessment/Plan:  1  Leukocytosis, unspecified type  2  Thrombocytosis  3  Abnormality of red blood cells  Ms Elkins is a 71-year-old with longstanding leukocytosis, intermittent thrombocytosis and evidence of teardrop cells on peripheral smear  Most recently her workup has been negative today  Her symptoms of fatigue are affecting her daily living and giving her much anxiety and depression  Due to uncertain diagnosis I suggested a bone marrow biopsy to be scheduled in the near future  Had a long conversation with the patient and her mother regarding that even if a diagnosis is obtained from the bone marrow biopsy it may not explain why she is significantly fatigued  I also explained that her leukocytosis and thrombocytosis may be directly related to smoking but cannot be definitive in this until bone marrow biopsy is completed  The patient expressed concerns about work and being able to return to work due to any diagnosis  I explained that any of my differential diagnoses should not inhibit her from returning to work in the future  We talked about a possible intermittent medical leave until a diagnosis is made, and depression is better managed  She will think about this and discuss this with her psychiatrist at her appointment coming up in April  Due to the profound fatigue we will check B12 levels as her diet is not great due to anxiety and lack of appetite and supplement if necessary     - Ambulatory Referral to Interventional Radiology; Future  - Vitamin B12; Future  - Methylmalonic acid, serum; Future    4  Low ferritin level  Her iron levels are borderline low  I suggested an oral iron supplement which may help with her fatigue    I suggested she use Slow Fe to avoid GI discomfort  5  Chronic fatigue  6  Depression, recurrent ([address] )  Her fatigue has been significant and affecting her job  She is seeing a psychiatrist on April 12  In hopes to help in getting her depression under control  She has not had a B12 level drawn in quite some time so I will check these and we discussed that she may need B12 injections in hopes to help with her fatigue     - Ambulatory Referral to Interventional Radiology; Future  - Vitamin B12; Future  - Methylmalonic acid, serum; Future      We will wait to schedule follow-up until bone marrow biopsy as scheduled and schedule her at an appropriate time  I explained to her and her mother that if the bone marrow biopsy is positive for a diagnosis I suggest her following up with a physician within our practice  Patient voiced agreement and understanding to the above  Patient knows to call the Hematology/Oncology office with any questions and concerns regarding the above  I have spent 60 minutes with Patient and family today in which greater than 50% of this time was spent in counseling/coordination of care regarding Diagnostic results, Risks and benefits of tx options and Patient and family education  Barrier(s) to care: None  The patient is able to self care   ------------------------------------------------------------------------------------------------------    Chief Complaint   Patient presents with    Follow-up       History of present illness:   Aj Baptiste is a 45-year-old female originally seen in consultation for longstanding leukocytosis with elevated neutrophils and teardrop cells noted on peripheral smear  She has significant fatigue and unclear whether this is due to a Hematology problem, or longstanding diagnosis of fibromyalgia and depression  The elevation in white blood cell count has been noted dating back to 2019  She started smoking intermittently in 2018 and slowly increased over time    Initial workup included:  Ultrasound of the abdomen: Unremarkable  BCR/ABL: Negative  SIA 2/CALR/MPL:Negative  Leukemia lymphoma flow cytometry:Negative  Thalassemia:Negative  Cbc:  WBC 12 08, platelets 718 4, hemoglobin 13 1  Iron panel:  Ferritin 21, iron saturation 13%, TIBC 393, serum iron 52  Peripheral smear:Leukocytosis with neutrophilia   Mild polychromasia of red blood cells with extremely rare dacrocyte identified  Interval history:  Her anxiety and depression has been worse since knowing she may have a blood disorder  She has missed work due to over sleeping  She is profoundly fatigued and overwhelmed  Review of Systems   Constitutional: Positive for fatigue  Negative for activity change, appetite change, fever and unexpected weight change  HENT: Negative for trouble swallowing and voice change  Eyes: Negative for photophobia and visual disturbance  Respiratory: Negative for cough, chest tightness and shortness of breath  Cardiovascular: Negative for chest pain, palpitations and leg swelling  Gastrointestinal: Positive for abdominal distention, abdominal pain and diarrhea  Negative for blood in stool, constipation, nausea and vomiting  Endocrine: Negative for cold intolerance and heat intolerance  Genitourinary: Positive for menstrual problem  Negative for difficulty urinating and hematuria  Musculoskeletal: Positive for arthralgias and myalgias  Skin: Negative for color change, pallor and rash  Neurological: Positive for headaches  Negative for dizziness, tremors, syncope, weakness and light-headedness  Hematological: Negative for adenopathy  Does not bruise/bleed easily  Psychiatric/Behavioral: Positive for sleep disturbance         Patient Active Problem List   Diagnosis    Current moderate episode of major depressive disorder (HCC)    Irritable bowel syndrome    Fibromyalgia    Morbid obesity with BMI of 40 0-44 9, adult (HCC)    Chronic fatigue    Skin lesion    Fatty liver  Arthralgia of multiple sites    Chronic bilateral low back pain with bilateral sciatica    Type 2 diabetes mellitus without complication (HCC)    Pure hypercholesterolemia    Depression, recurrent (HCC)       Past Medical History:   Diagnosis Date    Arthritis     Hiatal hernia     HPV (human papilloma virus) infection     Irritable bowel syndrome     Prediabetes        Past Surgical History:   Procedure Laterality Date    ADENOIDECTOMY      CERVICAL BIOPSY       SECTION      COLONOSCOPY      [place] ESOPHAGOGASTRODUODENOSCOPY TRANSORAL DIAGNOSTIC N/A 2017    Procedure: EGD AND COLONOSCOPY;  Surgeon: Catalina Mon MD;  Location: [facility]; Service: Gastroenterology    [place] LAP,CHOLECYSTECTOMY/GRAPH N/A 2018    Procedure: LAPAROSCOPIC CHOLECYSTECTOMY WITH IOC;  Surgeon: Siddharth Nichols MD;  Location: [facility];  Service: General    TONSILLECTOMY      TUBAL LIGATION         Family History   Problem Relation Age of Onset    Arthritis Mother     Fibromyalgia Mother     Endometriosis Mother     Other Mother         Eye pressure, gallbladder removal    Hypertension Father     Diabetes Father     Diabetes type II Father     Other Father         Gallbladder removal     Pancreatic cancer Maternal Uncle     Esophageal cancer Maternal Grandfather     Heart disease Paternal Grandmother     Hyperthyroidism Paternal Grandmother     COPD Paternal Grandmother     Stroke Neg Hx     Thyroid cancer Neg Hx        Social History     Socioeconomic History    Marital status: Legally      Spouse name: None    Number of children: None    Years of education: None    Highest education level: None   Occupational History    None   Tobacco Use    Smoking status: Current Every Day Smoker     Types: Cigarettes    Smokeless tobacco: Never Used   Vaping Use    Vaping Use: Never used   Substance and Sexual Activity    Alcohol use:  Yes     Alcohol/week: 1 0 standard drink Types: 1 Glasses of wine per week     Comment: glass of wine every other week    Drug use: No    Sexual activity: Yes     Partners: Male     Birth control/protection: Female Sterilization   Other Topics Concern    None   Social History Narrative    None     Social Determinants of Health     Financial Resource Strain: Not on file   Food Insecurity: Not on file   Transportation Needs: Not on file   Physical Activity: Not on file   Stress: Not on file   Social Connections: Not on file   Intimate Partner Violence: Not on file   Housing Stability: Not on file         Current Outpatient Medications:     acetaminophen (TYLENOL) 325 mg tablet, Take 325 mg by mouth every 6 (six) hours as needed for mild pain  , Disp: , Rfl:     Cetirizine HCl (ZYRTEC ALLERGY) 10 MG CAPS, Take by mouth daily  , Disp: , Rfl:     dicyclomine (BENTYL) 10 mg capsule, TAKE 1 CAPSULE BY MOUTH 3 TIMES DAILY  , Disp: 90 capsule, Rfl: 8    DULoxetine (CYMBALTA) 60 mg delayed release capsule, TAKE 2 CAPSULES BY MOUTH DAILY (Patient taking differently: 120 mg  ), Disp: 60 capsule, Rfl: 0    ergocalciferol (VITAMIN D2) 50,000 units,  , Disp: , Rfl:     montelukast (SINGULAIR) 10 mg tablet, TAKE 1 TABLET BY MOUTH DAILY AT BEDTIME, Disp: 30 tablet, Rfl: 3    olopatadine HCl (PATADAY) 0 2 % opth drops, Apply 0 2 % to eye as needed  , Disp: , Rfl:     pantoprazole (PROTONIX) 40 mg tablet, Take 1 tablet (40 mg total) by mouth daily, Disp: 90 tablet, Rfl: 3    Vitamin D, Cholecalciferol, 50 MCG (2000 UT) CAPS, Take by mouth, Disp: , Rfl:     gabapentin (NEURONTIN) 300 mg capsule, Take 1 capsule (300 mg total) by mouth 3 (three) times a day for 7 days, THEN 2 capsules (600 mg total) 3 (three) times a day for 23 days  , Disp: 159 capsule, Rfl: 0    glyBURIDE (DIABETA) 2 5 mg tablet, Take 1 tablet (2 5 mg total) by mouth daily with breakfast, Disp: 30 tablet, Rfl: 1    meloxicam (MOBIC) 15 mg tablet, Take 15 mg by mouth daily   (Patient not taking: Reported on 3/18/2022 ), Disp: , Rfl:     valACYclovir (VALTREX) 1,000 mg tablet, Take 1 tablet (1,000 mg total) by mouth 3 (three) times a day for 7 days (Patient not taking: Reported on 6/28/2021), Disp: 21 tablet, Rfl: 0    Allergies   Allergen Reactions    Nuts - Food Allergy Facial Swelling and Swelling     Other reaction(s): swollen tongue    Other Hives, Itching and Swelling     Sneezing, itchy eyes  Other reaction(s): swollen tongue  Pt is allergic to dogs  Beer    Desogestrel-Ethinyl Estradiol Hives    Pineapple - Food Allergy Tongue Swelling    Dog Epithelium Allergy Skin Test Itching     Other reaction(s): sneezing, itchy eyes    Dust Mite Extract Itching    Ortho Tri-Cyclen (28) [Norgestimate-Eth Estradiol] Other (See Comments)     Burning       Objective:  /88 (BP Location: Left arm, Patient Position: Sitting, Cuff Size: Adult)   Pulse (!) 106   Temp (!) 97 3 °F (36 3 °C)   Resp 16   Ht 5' 5 5" (1 664 m)   Wt 115 kg (253 lb)   SpO2 95%   BMI 41 46 kg/m²    Physical Exam  Constitutional:       General: She is not in acute distress  Appearance: Normal appearance  She is not ill-appearing  HENT:      Head: Atraumatic  Eyes:      Extraocular Movements: Extraocular movements intact  Conjunctiva/sclera: Conjunctivae normal    Cardiovascular:      Rate and Rhythm: Normal rate and regular rhythm  Pulses: Normal pulses  Heart sounds: Normal heart sounds  Pulmonary:      Effort: Pulmonary effort is normal       Breath sounds: Normal breath sounds  Abdominal:      General: Bowel sounds are normal       Palpations: Abdomen is soft  Tenderness: There is no abdominal tenderness  Musculoskeletal:      Right lower leg: No edema  Left lower leg: No edema  Lymphadenopathy:      Cervical: No cervical adenopathy  Skin:     General: Skin is warm and dry  Capillary Refill: Capillary refill takes less than 2 seconds     Neurological:      General: No focal deficit present  Mental Status: She is alert and oriented to person, place, and time  Mental status is at baseline  Motor: No weakness  Gait: Gait normal    Psychiatric:         Mood and Affect: Mood normal          Behavior: Behavior normal          Thought Content: Thought content normal          Judgment: Judgment normal        Result Review  Labs:  Appointment on 03/16/2022   Component Date Value Ref Range Status    Scan Result 03/16/2022 SEE WRITTEN REPORT   Final   Appointment on 03/07/2022   Component Date Value Ref Range Status    Scan Result 03/07/2022 SEE WRITTEN REPORT   Final    AChR Blocking Abs, Serum 03/07/2022 21  0 - 25 % Final                                   Negative:      0 - 25                                 Borderline:   26 - 30                                 Positive:         >30    AChR Binding Ab, Serum 03/07/2022 <0 03  0 00 - 0 24 nmol/L Final                                   Negative:   0 00 - 0 24                                 Borderline: 0 25 - 0 40                                 Positive:         >0 40    MUSK ANTIBODY 03/07/2022 <1 0  U/mL Final    Comment:   Reference Range:    Negative: <1 0    Positive: 1 0 or higher      A positive result, in the context of congruent clinical    findings, confirms the diagnosis of autoimmune MuSK    myasthenia gravis  COMMENTS:    - Myasthenia gravis (MG) is caused by auto-antibodies      against proteins of the neuromuscular junction  Most      cases (about 90%) of generalized MG are anti-      acetylcholine receptor (AChR) antibody-positive  (1)      - Of generalized MG patients who lack anti-AChR antibodies      (AChR-seronegative), about 40% are positive for Muscle-      Specific Kinase (MuSK) antibody (1,2)      - Though a positive MuSK result is specific for the      diagnosis of MuSK MG, a negative MuSK result does not      rule out a MG diagnosis        - MuSK antibody levels have been shown to correlate with disease severity  (3) Serial measurements may be useful      to follow treatment  References:  1  Micky S et al  J Autoimmunity 2014;52:   2  Juana MG et al  [place]                            2013;110(99);[id number]  3  Winston E et al  Neurology 2006;67:505-507  This test was developed and its performance characteristics  determined by [facility]  It has not been cleared or approved  by the Food and Drug Administration  Appointment on 02/25/2022   Component Date Value Ref Range Status    Segmented Neutrophils Manual 02/25/2022 77  45 - 77 % Final    Bands Manual 02/25/2022 7  Thousand/uL Final    Lymphocytes Manual 02/25/2022 10* 14 - 44 % Final    Monocytes Manual 02/25/2022 2* 4 - 12 % Final    Eosinophils Manual 02/25/2022 2  0 - 6 % Final    Metamyelocytes 02/25/2022 1  0 - 1 % Final    MYELOCYTE -MAN DIFF 02/25/2022 1  0 - 1 % Final    Total Counted 02/25/2022 100   Final    Polychromasia 02/25/2022 Present   Final    Anisocytosis 02/25/2022 Present   Final    Platelet Estimate 19/81/0025 Increased* Adequate Final    ROUTING (BETA-THALASSEMIA) 02/25/2022 Comment   Final    The test has been completed and the results have been faxed to you   Hemoglobin A1C 02/25/2022 6 7* Normal 3 8-5 6%; PreDiabetic 5 7-6 4%; Diabetic >=6 5%; Glycemic control for adults with diabetes <7 0% % Final    EAG 02/25/2022 146  mg/dl Final    Iron Saturation 02/25/2022 13* 15 - 50 % Final    TIBC 02/25/2022 393  250 - 450 ug/dL Final    Iron 02/25/2022 52  50 - 170 ug/dL Final    Patients treated with metal-binding drugs (ie  Deferoxamine) may have depressed iron values      Ferritin 02/25/2022 21  8 - 388 ng/mL Final    Hgb F Quant 02/25/2022 0 0  0 0 - 2 0 % Final    Hgb A 02/25/2022 97 4  96 4 - 98 8 % Final    Hgb S Quant 02/25/2022 0 0  0 0 % Final    Hgb A2 Quant 02/25/2022 2 6  1 8 - 3 2 % Final    Hgb Interp  02/25/2022 Comment   Final    Normal hemoglobin present; no hemoglobin variant or thalassemia  observed   Path Review 2022 Leukocytosis with neutrophilia  Mild polychromasia of red blood cells with extremely rare dacrocyte identified  Suggest clinical correlation with ongoing hematology-oncology work-up and results  Final    Miscellaneous Lab Test Result 2022 SEE WRITTEN REPORT   Final   Consult on 2022   Component Date Value Ref Range Status    WBC 2022 12 08* 4 31 - 10 16 Thousand/uL Final    RBC 2022 4 99  3 81 - 5 12 Million/uL Final    Hemoglobin 2022 13 1  11 5 - 15 4 g/dL Final    Hematocrit 2022 40 9  34 8 - 46 1 % Final    MCV 2022 82  82 - 98 fL Final    MCH 2022 26 3* 26 8 - 34 3 pg Final    MCHC 2022 32 0  31 4 - 37 4 g/dL Final    RDW 2022 14 8  11 6 - 15 1 % Final    MPV 2022 9 8  8 9 - 12 7 fL Final    Platelets 10/36/7154 434* 149 - 390 Thousands/uL Final    nRBC 2022 0  /100 WBCs Final       Imagin22: US left upper quadrant  SPLEEN:    11 1 x 11 7 x 4 2 cm  Volume:289 3  Normal in size and contour  No mass  No perisplenic collections    Please note: This report has been generated by a voice recognition software system  Therefore there may be syntax, spelling, and/or grammatical errors  Please call if you have any questions

## 2022-04-04 DIAGNOSIS — M79.7 FIBROMYALGIA: ICD-10-CM

## 2022-04-04 RX ORDER — GABAPENTIN 300 MG/1
CAPSULE ORAL
Qty: 159 CAPSULE | Refills: 0 | Status: SHIPPED | OUTPATIENT
Start: 2022-04-04 | End: 2022-05-05

## 2022-04-05 LAB — METHYLMALONATE SERPL-SCNC: 185 NMOL/L (ref 0–378)

## 2022-04-07 ENCOUNTER — TELEPHONE (OUTPATIENT)
Dept: INTERNAL MEDICINE CLINIC | Facility: CLINIC | Age: 47
End: 2022-04-07

## 2022-04-07 NOTE — TELEPHONE ENCOUNTER
CJ Felix :    Patient will be coming in tomorrow, she will discuss with you FMLA, will need a letter for work

## 2022-04-08 ENCOUNTER — OFFICE VISIT (OUTPATIENT)
Dept: INTERNAL MEDICINE CLINIC | Facility: CLINIC | Age: 47
End: 2022-04-08
Payer: COMMERCIAL

## 2022-04-08 VITALS
HEIGHT: 65 IN | HEART RATE: 95 BPM | DIASTOLIC BLOOD PRESSURE: 82 MMHG | WEIGHT: 251 LBS | OXYGEN SATURATION: 99 % | SYSTOLIC BLOOD PRESSURE: 134 MMHG | RESPIRATION RATE: 18 BRPM | BODY MASS INDEX: 41.82 KG/M2 | TEMPERATURE: 99.9 F

## 2022-04-08 DIAGNOSIS — M79.7 FIBROMYALGIA: Primary | ICD-10-CM

## 2022-04-08 DIAGNOSIS — F33.9 DEPRESSION, RECURRENT (HCC): ICD-10-CM

## 2022-04-08 DIAGNOSIS — F41.9 ANXIETY: ICD-10-CM

## 2022-04-08 DIAGNOSIS — F33.1 MODERATE EPISODE OF RECURRENT MAJOR DEPRESSIVE DISORDER (HCC): ICD-10-CM

## 2022-04-08 DIAGNOSIS — F41.1 GAD (GENERALIZED ANXIETY DISORDER): ICD-10-CM

## 2022-04-08 DIAGNOSIS — G47.00 INSOMNIA, UNSPECIFIED TYPE: ICD-10-CM

## 2022-04-08 DIAGNOSIS — G47.62 NOCTURNAL LEG CRAMPS: ICD-10-CM

## 2022-04-08 DIAGNOSIS — M72.0 DUPUYTREN'S DISEASE OF BOTH PALM AND FINGER: ICD-10-CM

## 2022-04-08 PROCEDURE — 3725F SCREEN DEPRESSION PERFORMED: CPT

## 2022-04-08 PROCEDURE — 3008F BODY MASS INDEX DOCD: CPT

## 2022-04-08 PROCEDURE — 99214 OFFICE O/P EST MOD 30 MIN: CPT

## 2022-04-08 RX ORDER — DULOXETIN HYDROCHLORIDE 60 MG/1
120 CAPSULE, DELAYED RELEASE ORAL DAILY
Qty: 60 CAPSULE | Refills: 5 | Status: SHIPPED | OUTPATIENT
Start: 2022-04-08

## 2022-04-08 RX ORDER — DOXEPIN HYDROCHLORIDE 6 MG/1
6 TABLET ORAL
Qty: 20 TABLET | Refills: 3 | Status: SHIPPED | OUTPATIENT
Start: 2022-04-08 | End: 2022-05-20

## 2022-04-08 NOTE — PATIENT INSTRUCTIONS
Raynaud Disease   WHAT YOU NEED TO KNOW:   What is Raynaud disease? Raynaud disease is a disorder that affects blood circulation, usually in the hands and feet  The arteries (blood vessels) that carry blood to your fingers, toes, ears, or nose tighten  This is often triggered by cold or emotional stress  The decrease in blood flow causes a lack of oxygen and changes in skin color  Over time, ulcers or gangrene (tissue death) may develop if frequent or severe attacks are not prevented  What causes Raynaud disease? · Primary Raynaud: The cause of primary Raynaud disease is unknown  This form usually affects both hands and feet  It is more common and is often milder than secondary Raynaud  It often affects women and first appears before the age of 27  · Secondary Raynaud: This form is also known as Raynaud phenomenon  Nicotine, alcohol, caffeine, and exposure to certain chemicals, such as vinyl chloride, can increase your risk for secondary Raynaud  The following are some common causes:     ? Inflammatory and autoimmune diseases:  Secondary Raynaud may be caused by certain diseases, such as scleroderma, lupus, Sjogren's syndrome, rheumatoid arthritis, and carpal tunnel syndrome  ? Medicines or illegal drugs:  Medicines used to treat high blood pressure, headaches, cancer, or colds may cause Raynaud disease  Use of illegal street drugs, such as amphetamines or cocaine, and some herbs may also cause Raynaud  ? Trauma or injuries:  Long-term use of vibrating tools, such as chain saws, grinders, or drills, may hurt nerves or blood vessels  Injuries to the hands or feet, such as a wrist fracture, surgery, or frostbite may also cause damage  What are the signs and symptoms of Raynaud disease? Your fingers or toes may first turn pale when you are exposed to cold or stressful situations  Due to the decrease in blood supply, your fingers or toes may then turn blue and may feel cold and numb   As blood supply returns to your fingers or toes, they become bright red  You may feel tingling, throbbing, or pain in your fingers or toes  Additional signs and symptoms may include the following:  · Primary Raynaud: The color changes usually affect both hands or feet in the same way and at the same time  You may develop thick or tight skin and brittle nails over time  Signs and symptoms are usually mild  · Secondary Raynaud: The color changes usually do not affect both hands or feet in the same way or at the same time  You may develop thick or tight skin and brittle nails over time  You may also develop skin ulcers  Your skin may develop gangrene if your fingers or toes do not get enough blood for a long period of time  Signs and symptoms are generally more severe  How is Raynaud disease diagnosed? · Nail fold capillary test:  Your healthcare provider may put a drop of oil on your nail folds (skin at the base of the fingernail)  The capillaries (tiny blood vessels) will then be checked under a microscope  · Blood tests: You may need blood taken to give healthcare providers information about how your body is working  The blood may be taken from your hand, arm, or IV  · Angiography: This test looks for problems with your arteries in your hands, arms, feet, and legs  Before the x-ray, a dye is put into a thin tube through a small cut in your groin  The dye helps the arteries show up better on these x-ray pictures  Tell the healthcare provider if you have ever had an allergic reaction to contrast dye  · Arterial doppler: An arterial doppler test is done to check blood flow through an artery  A small metal disc with gel on it is placed on your skin over the artery  You can hear a "whooshing" sound when the blood is flowing through the artery   An "X" may be marked on your skin where healthcare providers feel or hear the blood flowing best  Healthcare providers may need to check blood flow more than once     · X-rays:  Pictures of the bones, soft tissues, and other parts of your body may be taken  X-rays can show changes that will help healthcare providers learn if you have other diseases that may be causing Raynaud disease  How is Raynaud disease treated? Healthcare providers may tell you to avoid things or situations that could trigger an attack  If your daily activities are affected and symptoms are hard to control, you may need any of the following:  · Medicines:      ? Alpha blockers: These medicines work by stopping a hormone that tightens blood vessels  ? Antithrombotics: These are medicines that break apart clots and restore blood flow  ? Calcium channel blockers: These medicines relax and open up small blood vessels in your hands and feet  They may also help heal skin ulcers on your fingers or toes  ? Vasodilators: These medicines relax and widen the walls of the arteries  This may also help heal skin ulcers  · Surgery:  A surgery called sympathectomy may be done to cut sympathetic nerves  Sympathetic nerves in your hands and feet control the opening and narrowing of blood vessels in your skin  Surgery may also need to be done if affected parts have developed gangrene  What can I do to care for my skin if I have Raynaud disease? · Avoid putting too much pressure on your fingertips, such as typing or playing the piano  This kind of pressure may cause your blood vessels to narrow and trigger an attack  · Check your feet and hands daily for numb areas, thinning or thickening skin, black spots, cracks, brittle nails, or ulcers  · Keep your skin clean and dry to prevent an infection  Use lotion that contains lanolin on your hands and feet to keep the skin from drying or cracking  What can I do to prevent a Raynaud disease attack?    · Avoid cold temperatures when possible:  Wear gloves, scarves, or other winter garments during the winter months or before you go into cold rooms     · Limit alcohol and caffeine:  Men should limit alcohol to 2 drinks a day  Women should limit alcohol to 1 drink a day  A drink is 12 ounces of beer, 5 ounces of wine, or 1½ ounces of liquor  Try drinking decaffeinated coffee, tea, or soda  Ask your healthcare provider for more information about alcohol and caffeine  · Use caution with medicines:  Talk to your healthcare provider before you use medicines that may trigger an attack  These include certain medicines used for treating high blood pressure, headaches, cancer, or colds  · Exercise regularly: This prevents narrowing of the blood vessels and increases blood flow in your body  · Learn to manage stress:  Stress may trigger an attack  Try new ways to relax, such as deep breathing, meditation, or biofeedback  Biofeedback is a way to control how your body reacts to stress or pain  · Stop smoking:  If you smoke, it is never too late to quit  Smoking causes your blood vessels to narrow and may trigger an attack  Ask your healthcare provider for information if you need help quitting  What should I do during a Raynaud disease attack? · Get inside to warm yourself  · Wiggle your fingers or toes, or swing your arms around to increase circulation  Massage the affected parts  · Place your hands under your armpits or run warm water over the affected area  Do not  place the affected part in direct contact with hot water or a hot water bottle  The affected parts may be injured if they are not able to feel that the water is hot  · Get yourself out of stressful situations if possible  Deep breathing, meditation, or biofeedback may help decrease stress  Where can I find more information?    · [employer] and [address] , [address]  Phone: 9- 992 - 492-2159  Phone: 4- 091 - [phone]  Web Address: [employer] [facility]    · [employer] [facility] and [address]  [id number]  [place] [address]  Phone: 4- 906 - [phone]  Web Address: [employer] no    When should I contact my healthcare provider? · You have new symptoms since your last appointment  · Your symptoms prevent you from doing your daily activities  · You need help to quit smoking  · You have questions or concerns about your condition or care  When should I seek immediate care? · You have many attacks even if you prevent cold, stress, or other triggers  · You have pain in your fingers or toes that does not go away or gets worse  · You have sores or ulcers on the tips of your fingers or toes that do not heal     · You have black spots on your fingers or toes  · Your hands or feet remain cold or discolored even after you warm them  CARE AGREEMENT:   You have the right to help plan your care  Learn about your health condition and how it may be treated  Discuss treatment options with your healthcare providers to decide what care you want to receive  You always have the right to refuse treatment  The above information is an  only  It is not intended as medical advice for individual conditions or treatments  Talk to your doctor, nurse or pharmacist before following any medical regimen to see if it is safe and effective for you  © Copyright [employer] 2022 Information is for End User's use only and may not be sold, redistributed or otherwise used for commercial purposes  All illustrations and images included in CareNotes® are the copyrighted property of [employer]  or [address]      Insomnia   WHAT [place]:   Insomnia is a condition that makes it hard to fall or stay asleep  Lack of sleep can lead to attention or memory problems during the day   You may also be tinoco, depressed, clumsy, or have headaches  DISCHARGE INSTRUCTIONS:   Contact your healthcare provider if:   · Your symptoms do not get better, or they get worse  · You begin to use drugs or alcohol to fall asleep  · You have questions or concerns about your condition or care  Medicines:   · Medicines  may help you sleep more regularly or help you feel less anxious  · Take your medicine as directed  Contact your healthcare provider if you think your medicine is not helping or if you have side effects  Tell him or her if you are allergic to any medicine  Keep a list of the medicines, vitamins, and herbs you take  Include the amounts, and when and why you take them  Bring the list or the pill bottles to follow-up visits  Carry your medicine list with you in case of an emergency  What you can do to improve your sleep:   · Create a sleep schedule  Try to go to sleep and wake up at the same times every day  Keep a record of your sleep patterns, and any sleeping problems you have  Bring the record to follow-up visits with healthcare providers  · Do not take naps  Naps could make it hard for you to fall asleep at bedtime  · Keep your bedroom cool, quiet, and dark  Turn on white noise, such as a fan, to help you relax  Do not use your bed for any activity that will keep you awake  Do not read, exercise, eat, or watch TV in your bedroom  · Get up if you do not fall asleep within 20 minutes  Move to another room and do something relaxing until you become sleepy  · Limit caffeine, alcohol, and food to earlier in the day  Only drink caffeine in the morning  Do not drink alcohol within 6 hours of bedtime  Do not eat a heavy meal right before you go to bed  · Exercise regularly  Daily exercise may help you sleep better  Do not exercise within 4 hours of bedtime  Follow up with your healthcare provider as directed: Your healthcare provider may refer you for cognitive behavioral therapy   A behavioral therapist may help you find ways to relax, decrease stress, and improve sleep  Write down your questions so you remember to ask them during your visits  © Copyright [employer] 2022 Information is for End User's use only and may not be sold, redistributed or otherwise used for commercial purposes  All illustrations and images included in CareNotes® are the copyrighted property of [employer]  or [employer]er[employer]   The above information is an  only  It is not intended as medical advice for individual conditions or treatments  Talk to your doctor, nurse or pharmacist before following any medical regimen to see if it is safe and effective for you

## 2022-04-08 NOTE — LETTER
Trae Romero   : 1975    To Whom It May Concern,    Doc Elana is currently under my care  Is my opinion that she would benefit from a Medical sabbatical at this time  She will be undergoing testing for leukocytosis with her hematologist   She is also suffering from severe anxiety which she is starting new treatment for  Please contact me with any questions regarding this matter        Sincerely,        Grace Rice

## 2022-04-11 ENCOUNTER — TELEPHONE (OUTPATIENT)
Dept: INTERNAL MEDICINE CLINIC | Facility: CLINIC | Age: 47
End: 2022-04-11

## 2022-04-11 NOTE — TELEPHONE ENCOUNTER
PA has been initated for the patients Doxepin HCl 6 mg tablets, patients Rueda: Timur AGUILAR Case ID: [id number]  Will check status in a few days

## 2022-04-12 ENCOUNTER — SOCIAL WORK (OUTPATIENT)
Dept: BEHAVIORAL/MENTAL HEALTH CLINIC | Age: 47
End: 2022-04-12
Payer: COMMERCIAL

## 2022-04-12 DIAGNOSIS — F33.9 DEPRESSION, RECURRENT (HCC): ICD-10-CM

## 2022-04-12 DIAGNOSIS — F33.1 MODERATE EPISODE OF RECURRENT MAJOR DEPRESSIVE DISORDER (HCC): Primary | ICD-10-CM

## 2022-04-12 DIAGNOSIS — F41.1 GAD (GENERALIZED ANXIETY DISORDER): ICD-10-CM

## 2022-04-12 PROBLEM — G47.00 INSOMNIA: Status: ACTIVE | Noted: 2022-04-12

## 2022-04-12 PROCEDURE — 90834 PSYTX W PT 45 MINUTES: CPT | Performed by: SOCIAL WORKER

## 2022-04-12 NOTE — PROGRESS NOTES
[facility] - [facility]    NAME: Sarah Mathews  AGE: 40 y o  SEX: female  : 1975     DATE: 2022     Assessment and Plan:     Problem List Items Addressed This Visit        Other    Current moderate episode of major depressive disorder ([address] )     Aamnda Gaviria is feeling overwhelmed by her current medical situation  She is reporting difficulty sleeping  She reports difficulty concentrating  She is tearful  She denies suicidal ideation  She will continue on Cymbalta 120 mg daily  She has an appointment scheduled with behavior health therapist   I placed referral to psychiatry for further evaluation         Relevant Medications    DULoxetine (CYMBALTA) 60 mg delayed release capsule    Doxepin HCl 6 MG TABS    Other Relevant Orders    Ambulatory Referral to Psychiatry    Fibromyalgia - Primary    Relevant Medications    DULoxetine (CYMBALTA) 60 mg delayed release capsule    Depression, recurrent (HCC)    Relevant Medications    DULoxetine (CYMBALTA) 60 mg delayed release capsule    Doxepin HCl 6 MG TABS    Other Relevant Orders    Ambulatory Referral to Psychiatry    MAIDA (generalized anxiety disorder)     Amanda Gaviria is feeling overwhelmed by her current medical situation  She is reporting difficulty sleeping  She reports difficulty concentrating  She is tearful  She denies suicidal ideation  She will continue on Cymbalta 120 mg daily  She has an appointment scheduled with behavior health therapist   I placed referral to psychiatry for further evaluation         Relevant Medications    DULoxetine (CYMBALTA) 60 mg delayed release capsule    Doxepin HCl 6 MG TABS    Other Relevant Orders    Ambulatory Referral to Psychiatry    Insomnia     She is reporting difficulty sleeping and states only getting a couple hours a night  Will try doxepin 6 mg at bedtime  She states that she will only take this when she does not have her children house  Relevant Medications    Doxepin HCl 6 MG TABS    Other Relevant Orders    Ambulatory Referral to Psychiatry      Other Visit Diagnoses     Nocturnal leg cramps        Relevant Medications    magnesium oxide (MAG-OX) 400 mg    Anxiety        Dupuytren's disease of both palm and finger        Relevant Orders    Ambulatory Referral to PT/OT Hand Therapy              Return in about 4 weeks (around 5/6/2022) for Recheck  Chief Complaint:     Chief Complaint   Patient presents with    Follow-up     3 week check up, pain and tingling in hands color changes        History of Present Illness:     Benitez Fajardo presents for 3 week follow-up related to her anxiety and fatigue  She would like to discuss a leave of absence from work today  She has seen Hematology  She states she will be having a bone marrow biopsy  She has an appointment scheduled behavioral health therapy for the the depression anxiety  She reports she is having blood difficulty sleeping  She also has a new concern related to changes of color in her hands  She did show me a picture of this which is consistent with Raynaud's  Did discuss management of symptoms  Review of Systems:     Review of Systems   Constitutional: Positive for fatigue  HENT: Negative  Respiratory: Negative  Cardiovascular: Negative  Gastrointestinal: Negative  Neurological: Positive for numbness (Numbness and tingling in hands)  Psychiatric/Behavioral: Positive for sleep disturbance  Negative for suicidal ideas  The patient is nervous/anxious           Problem List:     Patient Active Problem List   Diagnosis    Current moderate episode of major depressive disorder (HCC)    Irritable bowel syndrome    Fibromyalgia    Morbid obesity with BMI of 40 0-44 9, adult (HCC)    Chronic fatigue    Skin lesion    Fatty liver    Arthralgia of multiple sites    Chronic bilateral low back pain with bilateral sciatica    Type 2 diabetes mellitus without complication ([facility] )    Pure hypercholesterolemia    Depression, recurrent ([facility] [address] )        Objective:     /82 (BP Location: Left arm, Patient Position: Sitting, Cuff Size: Standard)   Pulse 95   Temp 99 9 °F (37 7 °C) (Tympanic)   Resp 18   Ht 5' 5" (1 651 m)   Wt 114 kg (251 lb)   SpO2 99%   BMI 41 77 kg/m²     Physical Exam  Constitutional:       General: She is not in acute distress  Appearance: She is obese  HENT:      Head: Normocephalic and atraumatic  Right Ear: External ear normal       Left Ear: External ear normal       Nose: Nose normal       Mouth/Throat:      Mouth: Mucous membranes are moist       Pharynx: Oropharynx is clear  Eyes:      Conjunctiva/sclera: Conjunctivae normal    Cardiovascular:      Rate and Rhythm: Normal rate and regular rhythm  Pulses: Normal pulses  Heart sounds: Normal heart sounds  No murmur heard  Pulmonary:      Effort: Pulmonary effort is normal       Breath sounds: Normal breath sounds  No wheezing  Musculoskeletal:         General: Normal range of motion  Cervical back: Neck supple  Skin:     General: Skin is warm and dry  Capillary Refill: Capillary refill takes less than 2 seconds  Neurological:      Mental Status: She is alert and oriented to person, place, and time  Psychiatric:         Mood and Affect: Mood is anxious and depressed  Behavior: Behavior normal          Thought Content: Thought content normal          Judgment: Judgment normal          I spent 25 minutes with this patient      [address] [place] [address]  [facility]

## 2022-04-12 NOTE — ASSESSMENT & PLAN NOTE
She is reporting difficulty sleeping and states only getting a couple hours a night  Will try doxepin 6 mg at bedtime  She states that she will only take this when she does not have her children house
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
## 2022-04-12 NOTE — ASSESSMENT & PLAN NOTE
Amanda Gaviria is feeling overwhelmed by her current medical situation  She is reporting difficulty sleeping  She reports difficulty concentrating  She is tearful  She denies suicidal ideation  She will continue on Cymbalta 120 mg daily    She has an appointment scheduled with behavior health therapist   I placed referral to psychiatry for further evaluation

## 2022-04-12 NOTE — ASSESSMENT & PLAN NOTE
Mar Tobias is feeling overwhelmed by her current medical situation  She is reporting difficulty sleeping  She reports difficulty concentrating  She is tearful  She denies suicidal ideation  She will continue on Cymbalta 120 mg daily    She has an appointment scheduled with behavior health therapist   I placed referral to psychiatry for further evaluation

## 2022-04-15 ENCOUNTER — SOCIAL WORK (OUTPATIENT)
Dept: BEHAVIORAL/MENTAL HEALTH CLINIC | Facility: CLINIC | Age: 47
End: 2022-04-15
Payer: COMMERCIAL

## 2022-04-15 DIAGNOSIS — F41.1 GAD (GENERALIZED ANXIETY DISORDER): ICD-10-CM

## 2022-04-15 DIAGNOSIS — F33.1 MODERATE EPISODE OF RECURRENT MAJOR DEPRESSIVE DISORDER (HCC): Primary | ICD-10-CM

## 2022-04-15 DIAGNOSIS — F33.9 DEPRESSION, RECURRENT (HCC): ICD-10-CM

## 2022-04-15 PROCEDURE — 90834 PSYTX W PT 45 MINUTES: CPT | Performed by: SOCIAL WORKER
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
## 2022-04-19 ENCOUNTER — SOCIAL WORK (OUTPATIENT)
Dept: BEHAVIORAL/MENTAL HEALTH CLINIC | Facility: CLINIC | Age: 47
End: 2022-04-19
Payer: COMMERCIAL

## 2022-04-19 DIAGNOSIS — F41.1 GAD (GENERALIZED ANXIETY DISORDER): ICD-10-CM

## 2022-04-19 DIAGNOSIS — F33.1 MODERATE EPISODE OF RECURRENT MAJOR DEPRESSIVE DISORDER (HCC): Primary | ICD-10-CM

## 2022-04-19 DIAGNOSIS — F33.9 DEPRESSION, RECURRENT (HCC): ICD-10-CM

## 2022-04-19 PROCEDURE — 90834 PSYTX W PT 45 MINUTES: CPT | Performed by: SOCIAL WORKER
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
## 2022-04-20 DIAGNOSIS — T78.40XA ALLERGY, INITIAL ENCOUNTER: ICD-10-CM

## 2022-04-20 RX ORDER — MONTELUKAST SODIUM 10 MG/1
TABLET ORAL
Qty: 30 TABLET | Refills: 3 | Status: SHIPPED | OUTPATIENT
Start: 2022-04-20

## 2022-04-26 ENCOUNTER — HOSPITAL ENCOUNTER (OUTPATIENT)
Dept: CT IMAGING | Facility: HOSPITAL | Age: 47
Discharge: HOME | End: 2022-04-26
Attending: INTERNAL MEDICINE
Payer: COMMERCIAL

## 2022-04-26 VITALS
RESPIRATION RATE: 16 BRPM | HEART RATE: 86 BPM | TEMPERATURE: 97 F | OXYGEN SATURATION: 93 % | DIASTOLIC BLOOD PRESSURE: 64 MMHG | SYSTOLIC BLOOD PRESSURE: 117 MMHG

## 2022-04-26 DIAGNOSIS — R53.82 CHRONIC FATIGUE: ICD-10-CM

## 2022-04-26 DIAGNOSIS — D72.829 LEUKOCYTOSIS, UNSPECIFIED TYPE: ICD-10-CM

## 2022-04-26 LAB — GLUCOSE SERPL-MCNC: 141 MG/DL (ref 65–140)

## 2022-04-26 PROCEDURE — 88185 FLOWCYTOMETRY/TC ADD-ON: CPT

## 2022-04-26 PROCEDURE — 77012 CT SCAN FOR NEEDLE BIOPSY: CPT | Performed by: RADIOLOGY

## 2022-04-26 PROCEDURE — 88184 FLOWCYTOMETRY/ TC 1 MARKER: CPT | Performed by: INTERNAL MEDICINE

## 2022-04-26 PROCEDURE — 82948 REAGENT STRIP/BLOOD GLUCOSE: CPT

## 2022-04-26 PROCEDURE — 85097 BONE MARROW INTERPRETATION: CPT | Performed by: PATHOLOGY

## 2022-04-26 PROCEDURE — 88305 TISSUE EXAM BY PATHOLOGIST: CPT | Performed by: PATHOLOGY

## 2022-04-26 PROCEDURE — 38222 DX BONE MARROW BX & ASPIR: CPT

## 2022-04-26 PROCEDURE — 88342 IMHCHEM/IMCYTCHM 1ST ANTB: CPT | Performed by: PATHOLOGY

## 2022-04-26 PROCEDURE — 88313 SPECIAL STAINS GROUP 2: CPT | Performed by: PATHOLOGY

## 2022-04-26 PROCEDURE — 88341 IMHCHEM/IMCYTCHM EA ADD ANTB: CPT | Performed by: PATHOLOGY

## 2022-04-26 PROCEDURE — 99152 MOD SED SAME PHYS/QHP 5/>YRS: CPT | Performed by: RADIOLOGY

## 2022-04-26 PROCEDURE — 88311 DECALCIFY TISSUE: CPT | Performed by: PATHOLOGY

## 2022-04-26 PROCEDURE — 99152 MOD SED SAME PHYS/QHP 5/>YRS: CPT

## 2022-04-26 PROCEDURE — 77012 CT SCAN FOR NEEDLE BIOPSY: CPT

## 2022-04-26 PROCEDURE — 38222 DX BONE MARROW BX & ASPIR: CPT | Performed by: RADIOLOGY

## 2022-04-26 RX ORDER — FENTANYL CITRATE 50 UG/ML
INJECTION, SOLUTION INTRAMUSCULAR; INTRAVENOUS CODE/TRAUMA/SEDATION MEDICATION
Status: COMPLETED | OUTPATIENT
Start: 2022-04-26 | End: 2022-04-26

## 2022-04-26 RX ORDER — MIDAZOLAM HYDROCHLORIDE 2 MG/2ML
INJECTION, SOLUTION INTRAMUSCULAR; INTRAVENOUS CODE/TRAUMA/SEDATION MEDICATION
Status: COMPLETED | OUTPATIENT
Start: 2022-04-26 | End: 2022-04-26

## 2022-04-26 RX ADMIN — FENTANYL CITRATE 50 MCG: 50 INJECTION, SOLUTION INTRAMUSCULAR; INTRAVENOUS at 08:49

## 2022-04-26 RX ADMIN — MIDAZOLAM HYDROCHLORIDE 1 MG: 1 INJECTION, SOLUTION INTRAMUSCULAR; INTRAVENOUS at 09:05

## 2022-04-26 RX ADMIN — FENTANYL CITRATE 50 MCG: 50 INJECTION, SOLUTION INTRAMUSCULAR; INTRAVENOUS at 09:00

## 2022-04-26 RX ADMIN — MIDAZOLAM HYDROCHLORIDE 1 MG: 1 INJECTION, SOLUTION INTRAMUSCULAR; INTRAVENOUS at 09:00

## 2022-04-26 RX ADMIN — MIDAZOLAM HYDROCHLORIDE 1 MG: 1 INJECTION, SOLUTION INTRAMUSCULAR; INTRAVENOUS at 08:49

## 2022-04-26 NOTE — LETTER
[address] [phone]    You are scheduled for a Bone Marrow Biopsy  On Tuesday 4/26/2022  Your arrival time is 7:30  Our Interventional Radiology nurse will notify you a few days before your procedure with the exact arrival time and location to arrive  To prepare for your procedure:  1  Please arrange for someone to drive you home after the procedure and stay with you until the next morning if you are instructed to do so  This is typically for patients receiving some type of sedative or anesthetic for the procedure  2  DO NOT EAT OR DRINK ANYTHING after midnight on the evening before your procedure including candy & gum   3  ONLY SIPS OF WATER with your medications are allowed on the morning of your procedure  4  TAKE ALL OF YOUR REGULAR MEDICATIONS THE MORNING OF YOUR PROCEDURE with sips of water! We may call you to stop some of your blood sugar, blood pressure and blood thinning medications depending on the procedure  Please take all of these medications unless we instruct you to stop them  5  If you have an allergy to x-ray dye, please contact Interventional Radiology for an x-ray dye preparation which usually consists of an oral steroid and Benadryl  The day of your procedure:  1  Bring a list of the medications you take at home  2  Bring medications you take for breathing problems (such as inhalers), medications for chest pain, or both  3  Bring your insurance card and a form of photo ID   4  Bring a case for your glasses or contacts  5  Please leave all valuables such as credit cards and jewelry at home  6  Report to the registration desk in the main lobby at the [facility]  Ask to be directed to the After Procedure Unit on the 1st floor  7  While your procedure is being performed your family may wait in the Waiting Room on the 1st floor  8  Be prepared to stay overnight just in case   Sometimes procedures will indicate the need for further observation or treatment  9  If you are scheduled for a follow-up visit with the Interventional Radiologist after your procedure, you will be called with a date and time      Special Instructions (Medications to alter or stop taking before your procedure etc ):    Approximate discharge time of 11:00am

## 2022-04-26 NOTE — PERIOPERATIVE NURSING NOTE
Dressing over Bone biopsy is clean dry and intact         Addendum: 5454 Dressing over puncture wound is clean dry and intact

## 2022-04-26 NOTE — DISCHARGE INSTRUCTIONS
Bone Marrow Biopsy     WHAT YOU NEED TO KNOW:   A bone marrow biopsy is a procedure to remove a small amount of bone marrow from your bone  Bone marrow is the soft tissue inside your bone that helps to make blood cells  The sample is tested for disease or infection  DISCHARGE INSTRUCTIONS:     1  Limit your activities day of biopsy as directed by your doctor  2  Use medication as ordered  3  Return to your normal diet  Small sips of flat soda will help with nausea  4  Remove band-aid or dressing 24 hours after procedure  Contact Interventional Radiology at [phone] [place] PATIENTS: Contact Interventional Radiology at [phone]) Seda Flank PATIENTS: Contact Interventional Radiology at [phone]) if:    1  Difficulty breathing, nausea or vomiting  2  Chills or fever above 101 F     3  Pain at biopsy site not relieved by medication  4  Develop any redness, swelling, heat, unusual drainage, heavy bruising or bleeding from biopsy site

## 2022-04-26 NOTE — BRIEF OP NOTE (RAD/CATH)
INTERVENTIONAL RADIOLOGY PROCEDURE NOTE    Date: 4/26/2022    Procedure: IR BIOPSY BONE MARROW    Preoperative diagnosis:   1  Chronic fatigue    2  Leukocytosis, unspecified type         Postoperative diagnosis: Same  Surgeon: Otf Shaw MD     Assistant: None  No qualified resident was available  Blood loss:  Minimal    Specimens:  Bone marrow    Findings:  Successful bone marrow biopsy    Complications: None immediate      Anesthesia: conscious sedation

## 2022-04-27 ENCOUNTER — TELEPHONE (OUTPATIENT)
Dept: PSYCHIATRY | Facility: CLINIC | Age: 47
End: 2022-04-27

## 2022-04-27 NOTE — TELEPHONE ENCOUNTER
Behavorial Health Outpatient Intake Questions    Referred by: PCP    Please advised interviewee that they need to answer all questions truthfully to allow for best care and any misrepresentations of information may affect their ability to be seen at this clinic   => Was this discussed? Yes     [facility]     Presenting Problem (in patient's words):   Depression, anxiety and panic attacks   Are there any developmental disabilities? ? If yes, can they speak to you on the phone? If they are too limited to speak to you on phone, refer out No    Are you taking any psychiatric medications? Yes    => If yes, who prescribes? If yes, are they injectable medications? Cymbalta      Does the patient have a language barrier or hearing impairment? No    Have you been treated at [facility] by a therapist or a doctor in the past? If yes, who? No    Has the patient been hospitalized for mental health? No   If yes, how long ago was last hospitalization and where was it? Do you actively use alcohol or marijuana or illegal substances? If yes, what and how much - refer out to Drug and alcohol treatment if use is excessive or daily use of illegal substances No concerns of substance abuse are reported  Do you have a community treatment team or ? No    Legal History-     Does the patient have any history of arrests, prison/longterm time, or DUIs? No  If Yes-  1) What types of charges? 2) When were they last incarcerated? 3) Are they currently on parole or probation? Minor Child-    Who has custody of the child? Is there a custody agreement? If there is a custody agreement remind parent that they must bring a copy to the first appt or they will not be seen       Intake Team, please check with provider before scheduling if flags come up such as:  - complex case  - legal history (other than DUI)  - communication barrier concerns are present  - if, in your judgment, this needs further review    ACCEPTED as a patient Yes  => Appointment Date: May 2, 2022 at 10am with Ann Zaldivar     Referred Elsewhere? No    Name of Insurance Co: [address] ID# [id number]  [id number] Phone #  If ins is primary or secondary  If patient is a minor, parents information such as Name, D  O B of guarantor

## 2022-04-28 LAB — SCAN RESULT: NORMAL

## 2022-05-02 ENCOUNTER — OFFICE VISIT (OUTPATIENT)
Dept: PSYCHIATRY | Facility: CLINIC | Age: 47
End: 2022-05-02
Payer: COMMERCIAL

## 2022-05-02 DIAGNOSIS — F41.1 GAD (GENERALIZED ANXIETY DISORDER): ICD-10-CM

## 2022-05-02 DIAGNOSIS — F33.1 MDD (MAJOR DEPRESSIVE DISORDER), RECURRENT EPISODE, MODERATE (HCC): Primary | ICD-10-CM

## 2022-05-02 DIAGNOSIS — M72.0 DUPUYTREN'S DISEASE OF BOTH PALM AND FINGER: ICD-10-CM

## 2022-05-02 DIAGNOSIS — F51.01 PRIMARY INSOMNIA: ICD-10-CM

## 2022-05-02 PROCEDURE — 90792 PSYCH DIAG EVAL W/MED SRVCS: CPT | Performed by: PHYSICIAN ASSISTANT

## 2022-05-02 RX ORDER — RAMELTEON 8 MG/1
8 TABLET ORAL
Qty: 30 TABLET | Refills: 1 | Status: SHIPPED | OUTPATIENT
Start: 2022-05-02 | End: 2022-06-27

## 2022-05-02 RX ORDER — BUPROPION HYDROCHLORIDE 150 MG/1
150 TABLET ORAL EVERY MORNING
Qty: 30 TABLET | Refills: 1 | Status: SHIPPED | OUTPATIENT
Start: 2022-05-02 | End: 2022-06-27 | Stop reason: SDUPTHER
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
## 2022-05-03 ENCOUNTER — TELEPHONE (OUTPATIENT)
Dept: PSYCHIATRY | Facility: CLINIC | Age: 47
End: 2022-05-03

## 2022-05-03 ENCOUNTER — TELEPHONE (OUTPATIENT)
Dept: HEMATOLOGY ONCOLOGY | Facility: CLINIC | Age: 47
End: 2022-05-03

## 2022-05-03 PROBLEM — D50.9 IRON DEFICIENCY ANEMIA, UNSPECIFIED: Status: ACTIVE | Noted: 2022-05-03

## 2022-05-03 NOTE — TELEPHONE ENCOUNTER
Called and spoke with patient  Discussed bone marrow biopsy results  She is aware results show low iron stores  IV iron was recommended by Jacqueline Craven  Two types of IV iron were discussed: Venofer and Feraheme  Patient understands dosing and schedule differences between the two medications  If Lia Machuca is not covered by insurance, we will automatically supplement with Venofer  We discussed potential side effects which include but are not limited to IV site reactions, tattooing, hypotension, arthralgias, headache, nausea, and anaphylaxis  If patient experiences any side effects they are to call the office to discuss premedications are necessary for subsequent dosing  Patient scheduled for first Feraheme infusion on May 20th at 3pm at the [facility] center  Second Feraheme scheduled for May 27th at 3pm  All questions answered and patient verbalized understanding       Follow up scheduled with Jacqueline Craven on September 6th at 10:30 am

## 2022-05-03 NOTE — TELEPHONE ENCOUNTER
Fax from pharmacy requesting completion of Prior Auth for Ramelteon 8 MG tablets  Called [employer] at [phone] and initiated Prior Auth  Was provided with [id number]  Decision pending

## 2022-05-05 DIAGNOSIS — M79.7 FIBROMYALGIA: ICD-10-CM

## 2022-05-05 RX ORDER — GABAPENTIN 300 MG/1
CAPSULE ORAL
Qty: 159 CAPSULE | Refills: 0 | Status: SHIPPED | OUTPATIENT
Start: 2022-05-05

## 2022-05-06 ENCOUNTER — OFFICE VISIT (OUTPATIENT)
Dept: INTERNAL MEDICINE CLINIC | Facility: CLINIC | Age: 47
End: 2022-05-06
Payer: COMMERCIAL

## 2022-05-06 VITALS
SYSTOLIC BLOOD PRESSURE: 106 MMHG | WEIGHT: 250 LBS | RESPIRATION RATE: 20 BRPM | DIASTOLIC BLOOD PRESSURE: 78 MMHG | OXYGEN SATURATION: 96 % | TEMPERATURE: 97.8 F | HEIGHT: 65 IN | BODY MASS INDEX: 41.65 KG/M2 | HEART RATE: 106 BPM

## 2022-05-06 DIAGNOSIS — F33.9 DEPRESSION, RECURRENT (HCC): ICD-10-CM

## 2022-05-06 DIAGNOSIS — J06.9 UPPER RESPIRATORY TRACT INFECTION, UNSPECIFIED TYPE: Primary | ICD-10-CM

## 2022-05-06 PROCEDURE — 99213 OFFICE O/P EST LOW 20 MIN: CPT

## 2022-05-06 PROCEDURE — 3008F BODY MASS INDEX DOCD: CPT

## 2022-05-06 PROCEDURE — 87636 SARSCOV2 & INF A&B AMP PRB: CPT

## 2022-05-06 RX ORDER — MAGNESIUM OXIDE 400 MG/1
1 TABLET ORAL DAILY
COMMUNITY
Start: 2022-04-08 | End: 2022-06-06 | Stop reason: ALTCHOICE

## 2022-05-06 NOTE — PROGRESS NOTES
[facility] - [facility]    NAME: Farzaneh Maldonado  AGE: 40 y o  SEX: female  : 1975     DATE: 2022     Assessment and Plan:     Problem List Items Addressed This Visit        Other    Depression, recurrent ([facility] [address] )     Patient is now seeing Psychiatry and Behavioral therapy  They have headed Wellbutrin to her medications  She will be switching from doxepin for sleep to ramelteon           Other Visit Diagnoses     Upper respiratory tract infection, unspecified type    -  Primary    COVID and flu swab collected in office  Will follow-up with patient with results  Counseled on symptom management  Relevant Orders    Covid/Flu- Office Collect (Completed)              No follow-ups on file  Chief Complaint:     Chief Complaint   Patient presents with    Sinus Problem     patient reports congestion, trouble sleeping,        History of Present Illness:     Sandro Robles presents for a follow-up appointment related to fatigue, depression anxiety  She states a couple days ago she developed congestion, postnasal drip sinus pressure, sneezing, sore throat, cough  She denies fever or chills, chest pain or shortness of breath, nausea or vomiting  She states she has seen Psychiatry who has made some medication adjustments  She is seeing a behavioral health therapist She also recently had a bone marrow biopsy and will be following up for results on that with hematology  She offers no new complaints or concerns today other than the URI symptoms mentioned above     Review of Systems:     Review of Systems   Constitutional: Positive for fatigue  Negative for chills and fever  HENT: Positive for congestion, postnasal drip, rhinorrhea, sinus pressure, sinus pain, sneezing and sore throat (scratchy)  Respiratory: Positive for cough  Negative for shortness of breath  Cardiovascular: Negative for chest pain  Gastrointestinal: Positive for diarrhea  Problem List:     Patient Active Problem List   Diagnosis    Current moderate episode of major depressive disorder (HCC)    Irritable bowel syndrome    Fibromyalgia    Morbid obesity with BMI of 40 0-44 9, adult (HCC)    Chronic fatigue    Skin lesion    Fatty liver    Arthralgia of multiple sites    Chronic bilateral low back pain with bilateral sciatica    Type 2 diabetes mellitus without complication (HCC)    Pure hypercholesterolemia    Depression, recurrent ([address] )    MAIDA (generalized anxiety disorder)    Insomnia    Dupuytren's disease of both palm and finger    Iron deficiency anemia, unspecified        Objective:     /78 (BP Location: Left arm, Patient Position: Sitting, Cuff Size: Standard)   Pulse (!) 106   Temp 97 8 °F (36 6 °C) (Tympanic)   Resp 20   Ht 5' 5" (1 651 m)   Wt 113 kg (250 lb)   SpO2 96%   BMI 41 60 kg/m²     Physical Exam  Vitals and nursing note reviewed  Constitutional:       General: She is not in acute distress  Appearance: She is well-developed  She is obese  HENT:      Head: Normocephalic and atraumatic  Right Ear: Tympanic membrane normal       Left Ear: Tympanic membrane normal       Nose: Congestion present  Mouth/Throat:      Mouth: Mucous membranes are moist       Pharynx: Oropharynx is clear  No posterior oropharyngeal erythema  Comments: Cobblestoning of posterior pharynx  Eyes:      Conjunctiva/sclera: Conjunctivae normal    Neck:      Thyroid: No thyromegaly  Cardiovascular:      Rate and Rhythm: Normal rate and regular rhythm  Pulses: Normal pulses  Heart sounds: Normal heart sounds  No murmur heard  Pulmonary:      Effort: Pulmonary effort is normal  No respiratory distress  Breath sounds: Normal breath sounds  Abdominal:      General: Bowel sounds are normal       Palpations: Abdomen is soft  Tenderness: There is no abdominal tenderness     Musculoskeletal:         General: Normal range of motion  Cervical back: Normal range of motion and neck supple  Lymphadenopathy:      Cervical: No cervical adenopathy  Skin:     General: Skin is warm and dry  Capillary Refill: Capillary refill takes less than 2 seconds  Neurological:      General: No focal deficit present  Mental Status: She is alert and oriented to person, place, and time  Psychiatric:         Mood and Affect: Mood normal          Behavior: Behavior normal          Thought Content: Thought content normal          Judgment: Judgment normal          I spent 15 minutes with this patient      [address] [place] [address]  [facility]

## 2022-05-06 NOTE — TELEPHONE ENCOUNTER
Fax from [facility] with approval for Ramelteon 3/5/2022-5/4/2023  LM on pharmacy VM that medication has been approved  LM on Chani's VM that Medication has been approved  Will refer to Eugenio Ward for her information

## 2022-05-06 NOTE — PATIENT INSTRUCTIONS
Flonase 1 spray each nostril at bedtime  Continue Zyrtec  May use pseudoephedrine  Mucinex or Mucinex D for cough    Rhinosinusitis   AMBULATORY CARE:   Rhinosinusitis (RS)  is inflammation or infection of your nasal passages and sinuses  RS is most often caused by a virus but may be caused by bacteria  Acute RS lasts up to 12 weeks  Chronic RS lasts more than 12 weeks  Recurrent RS means you have 4 or more infections in 1 year  Common signs and symptoms:   · Fever    · Pain, pressure, redness, or swelling around the forehead, cheeks, or eyes    · Thick yellow or green discharge from your nose    · Loss of smell or taste    · Dry cough that happens mostly at night or when you lie down    · Headache and face pain that is worse when you lean forward    · Tooth pain, or pain when you chew    Seek care immediately if:   · You have trouble breathing, or wheezing that is getting worse  · You have a stiff neck, a fever, or a bad headache  · You cannot open your eye  · Your eyeball bulges out, or you cannot move your eye  · You are more sleepy than usual, or you notice changes in your ability to think, move, or talk  · You have swelling of your forehead or scalp  Call your doctor if:   · Your symptoms are worse or do not improve after 3 to 5 days of treatment  · You have questions or concerns about your condition or care  Treatment  may not be needed  Your symptoms may go away on their own  Your healthcare provider may recommend watchful waiting for up to 10 days before starting antibiotics  Antibiotics will not help if the infection is caused by a virus  Check with your provider before you take any over-the-counter medicines  You may need any of the following:  · Acetaminophen  decreases pain and fever  It is available without a doctor's order  Ask how much to take and how often to take it  Follow directions   Read the labels of all other medicines you are using to see if they also contain acetaminophen, or ask your doctor or pharmacist  Acetaminophen can cause liver damage if not taken correctly  Do not use more than 4 grams (4,000 milligrams) total of acetaminophen in one day  · NSAIDs , such as ibuprofen, help decrease swelling, pain, and fever  This medicine is available with or without a doctor's order  NSAIDs can cause stomach bleeding or kidney problems in certain people  If you take blood thinner medicine, always ask your healthcare provider if NSAIDs are safe for you  Always read the medicine label and follow directions  · Nasal steroid sprays  help decrease inflammation in your nose and sinuses  · Decongestants  help reduce swelling and drain mucus in the nose and sinuses  They may help you breathe easier  Do not use decongestants for more than 3 days  · Antihistamines  help dry mucus in the nose and relieve sneezing  · Antibiotics  help treat or prevent a bacterial infection  Self-care:   · Rinse your sinuses as directed  Use a sinus rinse device to rinse your nasal passages with a saline (salt water) solution or distilled water  Do not  use tap water  A sinus rinse will help thin the mucus in your nose and rinse away pollen and dirt  It will also help lower swelling so you can breathe normally  · Use a humidifier  to increase air moisture in your home  Moist air may make it easier for you to breathe and help decrease your cough  · Sleep with your head raised  Place an extra pillow under your head before you go to sleep to help your sinuses drain  · Drink liquids as directed  Ask your healthcare provider how much liquid to drink each day and which liquids are best for you  Liquids will thin the mucus in your nose and help it drain  Do not have drinks that contain alcohol or caffeine  · Do not smoke, and avoid secondhand smoke  Nicotine and other chemicals in cigarettes and cigars can make your symptoms worse   Ask your healthcare provider for information if you currently smoke and need help to quit  E-cigarettes or smokeless tobacco still contain nicotine  Talk to your healthcare provider before you use these products  Prevent the spread of germs:   · Wash your hands often with soap and water  Wash your hands after you use the bathroom, change a child's diaper, or sneeze  Wash your hands before you prepare or eat food  · Stay away from people who are sick  Some germs spread easily and quickly through contact  Follow up with your doctor as directed: You may be referred to an ear, nose, and throat specialist  Write down your questions so you remember to ask them during your visits  © Copyright [employer] 2022 Information is for End User's use only and may not be sold, redistributed or otherwise used for commercial purposes  All illustrations and images included in CareNotes® are the copyrighted property of [employer]  or [employer]er[employer] William  The above information is an  only  It is not intended as medical advice for individual conditions or treatments  Talk to your doctor, nurse or pharmacist before following any medical regimen to see if it is safe and effective for you

## 2022-05-07 LAB
FLUAV RNA RESP QL NAA+PROBE: NEGATIVE
FLUBV RNA RESP QL NAA+PROBE: NEGATIVE
SARS-COV-2 RNA RESP QL NAA+PROBE: NEGATIVE

## 2022-05-09 NOTE — ASSESSMENT & PLAN NOTE
Patient is now seeing Psychiatry and Behavioral therapy  They have headed Wellbutrin to her medications    She will be switching from doxepin for sleep to ramelteon

## 2022-05-17 ENCOUNTER — SOCIAL WORK (OUTPATIENT)
Dept: BEHAVIORAL/MENTAL HEALTH CLINIC | Facility: CLINIC | Age: 47
End: 2022-05-17
Payer: COMMERCIAL

## 2022-05-17 DIAGNOSIS — F33.1 MODERATE EPISODE OF RECURRENT MAJOR DEPRESSIVE DISORDER (HCC): Primary | ICD-10-CM

## 2022-05-17 DIAGNOSIS — F33.9 DEPRESSION, RECURRENT (HCC): ICD-10-CM

## 2022-05-17 DIAGNOSIS — F41.1 GAD (GENERALIZED ANXIETY DISORDER): ICD-10-CM

## 2022-05-17 PROCEDURE — 90834 PSYTX W PT 45 MINUTES: CPT | Performed by: SOCIAL WORKER

## 2022-05-17 NOTE — PSYCH
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  She is going to be on residential/disability through her job  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  She has heard from the [employer] president that the district accepted the terms of her resignation  She has one teacher who keeps in touch with her as well as another   She is appropriately dressed in a casual manner, well groomed and overweight  She feels that with her medical issues 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 and this will take her sometime  She is working on her [employer] everyday  She has a social media group  She states she is struggling putting herself on a schedule  She has insomnia and she feels like she has always been a  Night owl  She has two iron infusions scheduled  She states that her insurance ends 08/31/2022   She has to have the conversation with her children's father about him covering the children and we discussed the possibility of CHIP for her children  She has been managing her fibromyalgia with Lyrica until her insurance would not cover it  She was given resources about generic and patient assistance programs  She does have a friend who comes to visit, he had a work accident and broke a rib, ten they saw each other and he was in the hospital, being severely dehydrated    She has been trying to keep busy  CBT was implemented to challenge her procrastinating behaviors  She is struggling with everything that she was managing  She feels like she lost a part of herself with her job  She was encouraged to allow herself to grieve the loss of her employment  Jesus Manuel Portillo appears to be making progress as evidenced by her insight into what she needs to do  She also is trying to do more and is realizing how alone she is and her being encouraged to grieve her losses  Silvia Carrasco will have the conversation with her ex  about her kids insurance  She will follow up with this writer next week  Behavioral Health Treatment Plan [facility]: Diagnosis and Treatment Plan explained to Arnaldo David relates understanding diagnosis and is agreeable to Treatment Plan   Yes

## 2022-05-19 ENCOUNTER — TELEPHONE (OUTPATIENT)
Dept: INFUSION CENTER | Facility: CLINIC | Age: 47
End: 2022-05-19

## 2022-05-19 NOTE — TELEPHONE ENCOUNTER
I reached out to go over new patient information  Confirmed appointment date, time, location and current policies on visitors and masking  Pt stated she was going to bring her 10year old daughter but will make arrangements for childcare  Pt was also advised on our attendance policy that requires a minimum of 24hrs notice prior to scheduled appnts for any cancellations  Pt expressed understanding

## 2022-05-20 ENCOUNTER — HOSPITAL ENCOUNTER (OUTPATIENT)
Dept: INFUSION CENTER | Facility: CLINIC | Age: 47
Discharge: HOME | End: 2022-05-20
Payer: COMMERCIAL

## 2022-05-20 VITALS
HEART RATE: 93 BPM | RESPIRATION RATE: 18 BRPM | DIASTOLIC BLOOD PRESSURE: 90 MMHG | TEMPERATURE: 97 F | SYSTOLIC BLOOD PRESSURE: 134 MMHG | OXYGEN SATURATION: 97 %

## 2022-05-20 DIAGNOSIS — D50.9 IRON DEFICIENCY ANEMIA, UNSPECIFIED IRON DEFICIENCY ANEMIA TYPE: Primary | ICD-10-CM

## 2022-05-20 PROCEDURE — 96365 THER/PROPH/DIAG IV INF INIT: CPT

## 2022-05-20 RX ORDER — SODIUM CHLORIDE 9 MG/ML
20 INJECTION, SOLUTION INTRAVENOUS ONCE
Status: COMPLETED | OUTPATIENT
Start: 2022-05-20 | End: 2022-05-20

## 2022-05-20 RX ORDER — SODIUM CHLORIDE 9 MG/ML
20 INJECTION, SOLUTION INTRAVENOUS ONCE
Status: CANCELLED | OUTPATIENT
Start: 2022-05-27

## 2022-05-20 RX ADMIN — SODIUM CHLORIDE 20 ML/HR: 0.9 INJECTION, SOLUTION INTRAVENOUS at 15:19

## 2022-05-20 RX ADMIN — FERUMOXYTOL 510 MG: 510 INJECTION INTRAVENOUS at 15:21

## 2022-05-20 NOTE — PROGRESS NOTES
Patient tolerated treatment without incident  PIV removed intact for DC, coban wrap in place  Patient AAOx4 and ambulatory upon DC without gait disturbance noted

## 2022-05-20 NOTE — PROGRESS NOTES
Patient arrives to infusion center for 1st dose Feraheme today  New patient and 1 st dose education provided  VSS, PIV inserted without issue  Inbasket message sent to [facility] ([facility]) in Dr Roderick Perez office to inquire about lab orders prior to patient follow up - patient appreciative  Patient resting on recliner chair, call bell within reach

## 2022-05-21 DIAGNOSIS — E11.9 TYPE 2 DIABETES MELLITUS WITHOUT COMPLICATION, WITHOUT LONG-TERM CURRENT USE OF INSULIN (HCC): ICD-10-CM

## 2022-05-21 RX ORDER — GLYBURIDE 2.5 MG/1
TABLET ORAL
Qty: 30 TABLET | Refills: 1 | Status: SHIPPED | OUTPATIENT
Start: 2022-05-21 | End: 2022-07-22

## 2022-05-23 DIAGNOSIS — D72.829 LEUKOCYTOSIS, UNSPECIFIED TYPE: ICD-10-CM

## 2022-05-23 DIAGNOSIS — D75.839 THROMBOCYTOSIS: ICD-10-CM

## 2022-05-23 DIAGNOSIS — D50.9 IRON DEFICIENCY ANEMIA, UNSPECIFIED IRON DEFICIENCY ANEMIA TYPE: Primary | ICD-10-CM

## 2022-05-26 ENCOUNTER — SOCIAL WORK (OUTPATIENT)
Dept: BEHAVIORAL/MENTAL HEALTH CLINIC | Facility: CLINIC | Age: 47
End: 2022-05-26
Payer: COMMERCIAL

## 2022-05-26 DIAGNOSIS — F33.9 DEPRESSION, RECURRENT (HCC): ICD-10-CM

## 2022-05-26 DIAGNOSIS — F41.1 GAD (GENERALIZED ANXIETY DISORDER): ICD-10-CM

## 2022-05-26 DIAGNOSIS — F33.1 MODERATE EPISODE OF RECURRENT MAJOR DEPRESSIVE DISORDER (HCC): Primary | ICD-10-CM

## 2022-05-26 PROCEDURE — 90834 PSYTX W PT 45 MINUTES: CPT | Performed by: SOCIAL WORKER

## 2022-05-26 NOTE — PSYCH
Start Time 10:05PM-10:50PM  Psychotherapy Provided: Individual Psychotherapy 45 minutes   Length of time in session: 45 minutes, follow up in 3-4 week    No diagnosis found  MDD and MAIDA  Goals addressed in session: Goal 1   Pain:      none  0  Current suicide risk : Low   D  Gomez John is tired and had her iron infusion and has no more energy that she had prior to her infusion  She is friendly and cooperative  Her mood is anxious and her affect is congruent  She is well dressed in a casual manner, being overweight and well groomed  Her thought process is logical and speech is normal rate and normal volume  She had a coworker call her and ask her if she was okay and she started crying  She is currently wondering how she parented and worked at the same time  She is upset as 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 and 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 years of personal effects in her classroom  She is going to pack up her classroom today at 3pm   The principal will let her into the building and a  will assist her as her colleague could not accomodates the principal's request   She feels like she would like to go back on the last day of school 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  She has another iron infusion tomorrow  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  She is getting spurts of wanting to be creative and be working on her studio but she is not always wanting to do this  She feels as though she is procrastinating  She continues to grieve the loss of her marriage  She feels like she has to reinvent themselves  She brittnee not had the insurance conversation with her ex-  She feels like she is good at putting off what she has to do  She cried about her ex   Support was offered as well as review of self care      Beatriz Burton appears to be making some progress as evidenced by her being to process her hurt and her emotions as opposed to "glossing over them "  Area of concern is her feeling alone and her worry about her current situation both physically and financially  Tushar Walters will follow up with this writer when she returns from vacation  She will rest and engage is self care while on vacation  Behavioral Health Treatment Plan [facility]: Diagnosis and Treatment Plan explained to Sushma Barfield relates understanding diagnosis and is agreeable to Treatment Plan   Yes

## 2022-05-26 NOTE — PSYCH
Start Time 10:05PM-10:50PM  Psychotherapy Provided: Individual Psychotherapy 45 minutes     Length of time in session: 45 minutes, follow up in 3-4 week    No diagnosis found  Goals addressed in session: Goal 1     Pain:      none    0    Current suicide risk : Low     See above    Behavioral Health Treatment Plan [facility]: Diagnosis and Treatment Plan explained to Miguel Jane relates understanding diagnosis and is agreeable to Treatment Plan   Yes

## 2022-05-27 ENCOUNTER — HOSPITAL ENCOUNTER (OUTPATIENT)
Dept: INFUSION CENTER | Facility: CLINIC | Age: 47
End: 2022-05-27

## 2022-06-01 ENCOUNTER — OFFICE VISIT (OUTPATIENT)
Dept: OBGYN CLINIC | Facility: CLINIC | Age: 47
End: 2022-06-01
Payer: COMMERCIAL

## 2022-06-01 ENCOUNTER — APPOINTMENT (OUTPATIENT)
Dept: LAB | Facility: CLINIC | Age: 47
End: 2022-06-01
Payer: COMMERCIAL

## 2022-06-01 VITALS
WEIGHT: 255 LBS | DIASTOLIC BLOOD PRESSURE: 84 MMHG | BODY MASS INDEX: 42.49 KG/M2 | HEIGHT: 65 IN | SYSTOLIC BLOOD PRESSURE: 126 MMHG

## 2022-06-01 DIAGNOSIS — Z11.3 SCREENING EXAMINATION FOR STD (SEXUALLY TRANSMITTED DISEASE): ICD-10-CM

## 2022-06-01 DIAGNOSIS — Z20.828 HERPES EXPOSURE: Primary | ICD-10-CM

## 2022-06-01 DIAGNOSIS — D50.9 IRON DEFICIENCY ANEMIA, UNSPECIFIED IRON DEFICIENCY ANEMIA TYPE: Primary | ICD-10-CM

## 2022-06-01 PROCEDURE — 99213 OFFICE O/P EST LOW 20 MIN: CPT | Performed by: NURSE PRACTITIONER

## 2022-06-01 PROCEDURE — 87491 CHLMYD TRACH DNA AMP PROBE: CPT | Performed by: NURSE PRACTITIONER

## 2022-06-01 PROCEDURE — 86695 HERPES SIMPLEX TYPE 1 TEST: CPT | Performed by: NURSE PRACTITIONER

## 2022-06-01 PROCEDURE — 86696 HERPES SIMPLEX TYPE 2 TEST: CPT | Performed by: NURSE PRACTITIONER

## 2022-06-01 PROCEDURE — 87591 N.GONORRHOEAE DNA AMP PROB: CPT | Performed by: NURSE PRACTITIONER

## 2022-06-01 RX ORDER — SODIUM CHLORIDE 9 MG/ML
20 INJECTION, SOLUTION INTRAVENOUS ONCE
Status: CANCELLED | OUTPATIENT
Start: 2022-06-03

## 2022-06-01 NOTE — PROGRESS NOTES
Patient presents for sexually transmitted disease check  Sexual history reviewed with the patient  Her current boyfriend tested positive through blood work for HSV and she desires screening  She denies ever having a herpes outbreak  Previous history of STD:  HPV  Current symptoms include none  She also desires screening for GC/chlamydia  Contraception: tubal ligation  Patient's last menstrual period was 05/18/2022 (exact date)  Physical Exam  Constitutional:       Appearance: Normal appearance  Genitourinary:      Vulva normal       No lesions in the vagina  Right Labia: No rash, tenderness, lesions, skin changes or Bartholin's cyst      Left Labia: No tenderness, lesions, skin changes, Bartholin's cyst or rash  No labial fusion noted  No vaginal discharge, erythema, tenderness, bleeding, ulceration or granulation tissue  No vaginal prolapse present  No vaginal atrophy present  Cervix is parous  No cervical discharge, friability or lesion  Pelvic exam was performed with patient in the lithotomy position  HENT:      Head: Normocephalic and atraumatic  Pulmonary:      Effort: Pulmonary effort is normal    Abdominal:      Palpations: Abdomen is soft  Musculoskeletal:         General: Normal range of motion  Neurological:      Mental Status: She is alert and oriented to person, place, and time  Skin:     General: Skin is warm and dry  Psychiatric:         Mood and Affect: Mood normal          Behavior: Behavior normal          Thought Content: Thought content normal          Judgment: Judgment normal    Vitals and nursing note reviewed  Barron Aaron was seen today for exposure to std      Diagnoses and all orders for this visit:    Herpes exposure  -     Herpes I/II IgG Antibodies    Screening examination for STD (sexually transmitted disease)  -     Chlamydia/GC amplified DNA by PCR    Results will be released to [facility], if abnormal will call to review and discuss treatment plan

## 2022-06-02 LAB
HSV1 IGG SER IA-ACNC: 1.48 INDEX (ref 0–0.9)
HSV2 IGG SER IA-ACNC: <0.91 INDEX (ref 0–0.9)

## 2022-06-03 ENCOUNTER — HOSPITAL ENCOUNTER (OUTPATIENT)
Dept: INFUSION CENTER | Facility: CLINIC | Age: 47
Discharge: HOME | End: 2022-06-03
Payer: COMMERCIAL

## 2022-06-03 ENCOUNTER — TELEPHONE (OUTPATIENT)
Dept: OBGYN CLINIC | Facility: CLINIC | Age: 47
End: 2022-06-03

## 2022-06-03 VITALS
OXYGEN SATURATION: 94 % | TEMPERATURE: 96.7 F | SYSTOLIC BLOOD PRESSURE: 139 MMHG | RESPIRATION RATE: 18 BRPM | HEART RATE: 98 BPM | DIASTOLIC BLOOD PRESSURE: 85 MMHG

## 2022-06-03 DIAGNOSIS — D50.9 IRON DEFICIENCY ANEMIA, UNSPECIFIED IRON DEFICIENCY ANEMIA TYPE: Primary | ICD-10-CM

## 2022-06-03 LAB
C TRACH DNA SPEC QL NAA+PROBE: NEGATIVE
N GONORRHOEA DNA SPEC QL NAA+PROBE: NEGATIVE

## 2022-06-03 PROCEDURE — 96365 THER/PROPH/DIAG IV INF INIT: CPT

## 2022-06-03 RX ORDER — SODIUM CHLORIDE 9 MG/ML
20 INJECTION, SOLUTION INTRAVENOUS ONCE
Status: COMPLETED | OUTPATIENT
Start: 2022-06-03 | End: 2022-06-03

## 2022-06-03 RX ORDER — SODIUM CHLORIDE 9 MG/ML
20 INJECTION, SOLUTION INTRAVENOUS ONCE
Status: CANCELLED | OUTPATIENT
Start: 2022-06-03

## 2022-06-03 RX ADMIN — FERUMOXYTOL 510 MG: 510 INJECTION INTRAVENOUS at 15:34

## 2022-06-03 RX ADMIN — SODIUM CHLORIDE 20 ML/HR: 9 INJECTION, SOLUTION INTRAVENOUS at 15:30

## 2022-06-03 NOTE — PROGRESS NOTES
Patient is here for [facility]  Vitals are stable  Pt tolerated infusion without a problem  AVS provided  Pt aware of no more appointment at infusion center  Pt will follow up with her physician

## 2022-06-03 NOTE — TELEPHONE ENCOUNTER
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

## 2022-06-06 ENCOUNTER — ANNUAL EXAM (OUTPATIENT)
Dept: OBGYN CLINIC | Facility: CLINIC | Age: 47
End: 2022-06-06
Payer: COMMERCIAL

## 2022-06-06 VITALS
WEIGHT: 252.2 LBS | SYSTOLIC BLOOD PRESSURE: 126 MMHG | BODY MASS INDEX: 42.02 KG/M2 | HEIGHT: 65 IN | DIASTOLIC BLOOD PRESSURE: 82 MMHG

## 2022-06-06 DIAGNOSIS — Z12.31 ENCOUNTER FOR SCREENING MAMMOGRAM FOR MALIGNANT NEOPLASM OF BREAST: ICD-10-CM

## 2022-06-06 DIAGNOSIS — Z01.419 WOMEN'S ANNUAL ROUTINE GYNECOLOGICAL EXAMINATION: Primary | ICD-10-CM

## 2022-06-06 DIAGNOSIS — N94.6 DYSMENORRHEA: ICD-10-CM

## 2022-06-06 DIAGNOSIS — N92.0 MENORRHAGIA WITH REGULAR CYCLE: ICD-10-CM

## 2022-06-06 DIAGNOSIS — R10.2 PELVIC PAIN: ICD-10-CM

## 2022-06-06 DIAGNOSIS — R07.81 RIB PAIN ON LEFT SIDE: ICD-10-CM

## 2022-06-06 PROCEDURE — S0612 ANNUAL GYNECOLOGICAL EXAMINA: HCPCS | Performed by: NURSE PRACTITIONER

## 2022-06-06 PROCEDURE — G0476 HPV COMBO ASSAY CA SCREEN: HCPCS | Performed by: NURSE PRACTITIONER

## 2022-06-06 PROCEDURE — G0145 SCR C/V CYTO,THINLAYER,RESCR: HCPCS | Performed by: NURSE PRACTITIONER

## 2022-06-06 NOTE — PROGRESS NOTES
Subjective    HPI:     Ledy Cox is a 40 y o  female  She is a  3 Para 3, with  x 1 and C/S x 2  Her menstrual cycles are irregular periods since 6 months ago  She states she got her period today 22, 9 days early  Her periods last 7 days, very heavy and painful for 3-4 days  Dr Jalen Schafer mentioned endometrial biopsy and hysterectomy instead of ablation  She states this is something she would be interested in discussing  She was given a referral last year for a consult with Frederic that was not scheduled  Her current method of contraception includes tubal ligation  She denies issues with intimacy  She suffers from IBS-D worsens significantly with periods and stress incontinence and Gyn complaints  She complains of left rib pain with deep breathing  Denies injury or excessive coughing  She feels safe at home  She complains of depression/anxiety and is being treated for both  Medical, surgical and family history reviewed  Her dental care is not done, last visit   She does not eat a healthy diet and does not exercise regularly  She is happy with her weight  Gynecologic History    Patient's last menstrual period was 2022 (exact date)  Last Pap: 21  Results were: normal  Last mammogram: Needs scheduled    Obstetric History    OB History    Para Term  AB Living   3 3 3     3   SAB IAB Ectopic Multiple Live Births           3      # Outcome Date GA Lbr Landen/2nd Weight Sex Delivery Anes PTL Lv   3 Term     F CS-LTranv   AISLINN   2 Term     M CS-LTranv   AISLINN   1 Term     M Vag-Spont   AISLINN       The following portions of the patient's history were reviewed and updated as appropriate: allergies, current medications, past family history, past medical history, past social history, past surgical history and problem list     Review of Systems    Pertinent items are noted in HPI  Objective    Physical Exam  Constitutional:       Appearance: Normal appearance   She is well-developed  Genitourinary:      Vulva, bladder and urethral meatus normal       No lesions in the vagina  Right Labia: No rash, tenderness, lesions, skin changes or Bartholin's cyst      Left Labia: No tenderness, lesions, skin changes, Bartholin's cyst or rash  No labial fusion noted  No inguinal adenopathy present in the right or left side  No vaginal discharge, erythema, tenderness, bleeding or granulation tissue  No vaginal prolapse present  No vaginal atrophy present  Right Adnexa: not tender, not full and no mass present  Left Adnexa: not tender, not full and no mass present  Cervix is not parous  No cervical motion tenderness, discharge, friability, lesion, polyp or nabothian cyst       Uterus is not enlarged, tender, irregular or prolapsed  No uterine mass detected  Uterus exam comments: Generalized pain elicited with bimanual exam       Uterus is anteverted  Pelvic exam was performed with patient in the lithotomy position  Breasts: Breasts are symmetrical       Right: No inverted nipple, mass, nipple discharge, skin change, tenderness, axillary adenopathy or supraclavicular adenopathy  Left: No inverted nipple, mass, nipple discharge, skin change, tenderness, axillary adenopathy or supraclavicular adenopathy  HENT:      Head: Normocephalic and atraumatic  Neck:      Thyroid: No thyromegaly  Cardiovascular:      Rate and Rhythm: Normal rate and regular rhythm  Heart sounds: Normal heart sounds, S1 normal and S2 normal    Pulmonary:      Effort: Pulmonary effort is normal       Breath sounds: Normal breath sounds  Chest:      Chest wall: Tenderness (left ribs on palpation) present  Abdominal:      General: Bowel sounds are normal  There is no distension  Palpations: Abdomen is soft  There is no mass  Tenderness: There is no abdominal tenderness  There is no guarding        Hernia: There is no hernia in the left inguinal area or right inguinal area  Musculoskeletal:      Cervical back: Neck supple  Lymphadenopathy:      Cervical: No cervical adenopathy  Upper Body:      Right upper body: No supraclavicular or axillary adenopathy  Left upper body: No supraclavicular or axillary adenopathy  Lower Body: No right inguinal adenopathy  No left inguinal adenopathy  Neurological:      Mental Status: She is alert  Skin:     General: Skin is warm and dry  Findings: No rash  Psychiatric:         Attention and Perception: Attention and perception normal          Mood and Affect: Mood and affect normal          Speech: Speech normal          Behavior: Behavior is cooperative  Thought Content: Thought content normal          Cognition and Memory: Cognition and memory normal          Judgment: Judgment normal    Vitals and nursing note reviewed  Assessment and Plan    Yaa Santos was seen today for gynecologic exam     Diagnoses and all orders for this visit:    Women's annual routine gynecological examination    Encounter for screening mammogram for malignant neoplasm of breast  -     Mammo screening bilateral w 3d & cad; Future    Menorrhagia with regular cycle  -     Ambulatory Referral to Gynecology; Future    Dysmenorrhea  -     Ambulatory Referral to Gynecology; Future    Pelvic pain  -     US pelvis complete w transvaginal; Future    Rib pain on left side  -     XR ribs left w pa chest min 3 views; Future      Patient informed of a Stable GYN exam  A pap smear was performed  I have discussed the importance of exercise and healthy diet as well as adequate intake of calcium and vitamin D  The current ASCCP guidelines were reviewed  The low risk patient will receive pap smear screening every 3 years until the age of 34 and then every 3 to 5 years with HPV co-testing from the ages of 33-67   I emphasized the importance of an annual pelvic and breast exam  A yearly mammogram is recommended for breast cancer screening starting at age 36  Encouraged her to schedule her baseline mammogram      Pelvic US ordered for evaluation of pain on exam  An xray was ordered to evaluate left rib pain  A new referral provided for her to schedule an appt for consult with Dr Albino Curling       [facility], if abnormal will call to review and discuss treatment plan  All questions have been answered to her satisfaction  Contraception: tubal ligation  Mammogram ordered  Follow up in: 1 year

## 2022-06-07 LAB
HPV HR 12 DNA CVX QL NAA+PROBE: NEGATIVE
HPV16 DNA CVX QL NAA+PROBE: NEGATIVE
HPV18 DNA CVX QL NAA+PROBE: NEGATIVE

## 2022-06-08 ENCOUNTER — TELEPHONE (OUTPATIENT)
Dept: OBGYN CLINIC | Facility: CLINIC | Age: 47
End: 2022-06-08

## 2022-06-08 ENCOUNTER — HOSPITAL ENCOUNTER (OUTPATIENT)
Dept: ULTRASOUND IMAGING | Facility: HOSPITAL | Age: 47
Discharge: HOME | End: 2022-06-08
Payer: COMMERCIAL

## 2022-06-08 ENCOUNTER — HOSPITAL ENCOUNTER (OUTPATIENT)
Dept: RADIOLOGY | Facility: HOSPITAL | Age: 47
Discharge: HOME | End: 2022-06-08
Payer: COMMERCIAL

## 2022-06-08 DIAGNOSIS — R10.2 PELVIC PAIN: ICD-10-CM

## 2022-06-08 DIAGNOSIS — R07.81 RIB PAIN ON LEFT SIDE: ICD-10-CM

## 2022-06-08 PROCEDURE — 71101 X-RAY EXAM UNILAT RIBS/CHEST: CPT

## 2022-06-08 PROCEDURE — 76856 US EXAM PELVIC COMPLETE: CPT

## 2022-06-08 PROCEDURE — 76830 TRANSVAGINAL US NON-OB: CPT

## 2022-06-08 NOTE — TELEPHONE ENCOUNTER
----- Message from Marrianne Bernheim, [address] sent at 6/8/2022 10:15 AM EDT -----  Please inform patient of normal chest xray

## 2022-06-10 LAB
LAB AP GYN PRIMARY INTERPRETATION: NORMAL
LAB AP LMP: NORMAL
Lab: NORMAL

## 2022-06-13 ENCOUNTER — TELEPHONE (OUTPATIENT)
Dept: OBGYN CLINIC | Facility: CLINIC | Age: 47
End: 2022-06-13

## 2022-06-14 ENCOUNTER — OFFICE VISIT (OUTPATIENT)
Dept: INTERNAL MEDICINE CLINIC | Facility: CLINIC | Age: 47
End: 2022-06-14
Payer: COMMERCIAL

## 2022-06-14 VITALS
SYSTOLIC BLOOD PRESSURE: 122 MMHG | WEIGHT: 253 LBS | BODY MASS INDEX: 42.15 KG/M2 | HEIGHT: 65 IN | DIASTOLIC BLOOD PRESSURE: 78 MMHG | OXYGEN SATURATION: 98 % | TEMPERATURE: 96.2 F | RESPIRATION RATE: 20 BRPM | HEART RATE: 88 BPM

## 2022-06-14 DIAGNOSIS — E11.9 TYPE 2 DIABETES MELLITUS WITHOUT COMPLICATION, WITHOUT LONG-TERM CURRENT USE OF INSULIN (HCC): ICD-10-CM

## 2022-06-14 DIAGNOSIS — F33.9 DEPRESSION, RECURRENT (HCC): ICD-10-CM

## 2022-06-14 DIAGNOSIS — R11.0 NAUSEA: ICD-10-CM

## 2022-06-14 DIAGNOSIS — R10.32 LEFT LOWER QUADRANT ABDOMINAL PAIN: Primary | ICD-10-CM

## 2022-06-14 PROCEDURE — 99214 OFFICE O/P EST MOD 30 MIN: CPT

## 2022-06-14 RX ORDER — ONDANSETRON 4 MG/1
4 TABLET, ORALLY DISINTEGRATING ORAL EVERY 6 HOURS PRN
Qty: 20 TABLET | Refills: 0 | Status: SHIPPED | OUTPATIENT
Start: 2022-06-14

## 2022-06-14 NOTE — PROGRESS NOTES
[facility]    NAME: Rudy Cormier  AGE: 40 y o  SEX: female  : 1975     DATE: 2022     Assessment and Plan:     1  Left lower quadrant abdominal pain  Patient reports ongoing left lower quadrant pain, diarrhea  She is tender in bilateral lower quadrants  Pelvic ultrasound was unremarkable  Will get labs patient instructed to schedule CT scan with oral contrast rule out diverticulitis  - CT abdomen pelvis w contrast; Future  - Comprehensive metabolic panel; Future  - CBC and differential; Future    2  Type 2 diabetes mellitus without complication, without long-term current use of insulin ([facility])    - Hemoglobin A1C ([facility]); Future    3  Depression, recurrent ([facility] )  Symptoms are fairly stable  Patient is following with Psychiatry and Behavioral therapy  4  Nausea  She reports nausea vomiting with oral contrast   Patient may take Zofran tablet 20-30 minutes prior to CT scan  - ondansetron (Zofran ODT) 4 mg disintegrating tablet; Take 1 tablet (4 mg total) by mouth every 6 (six) hours as needed for nausea or vomiting  Dispense: 20 tablet; Refill: 0        No follow-ups on file  Chief Complaint:     Chief Complaint   Patient presents with    Fatigue     Patient reports feeling drained, hip pains, IBS bowel cramping, shoulder pain, lower back pain, fears it could be menopause        History of Present Illness:     Todd Huitron presents to the office today for a follow-up visit  She is still having fatigue and malaise  She will be getting iron infusions by Hematology and repeating her iron levels in August   She is reporting left-sided lower abdominal pain  She states that she has seen Gynecology who has recommended hysterectomy or ablation for dysmenorrhea and menorrhagia  Pelvic ultrasound was unremarkable  Patient states she is still having the left lower quadrant pain  She does have history of IBS-D    She denies fever, chills, nausea, or vomiting  Review of Systems:     Review of Systems   Constitutional: Positive for fatigue  Negative for fever  HENT: Negative  Respiratory: Negative  Cardiovascular: Negative  Gastrointestinal: Positive for abdominal pain and diarrhea  Genitourinary: Positive for menstrual problem  Musculoskeletal: Positive for arthralgias and myalgias  Neurological: Negative  Psychiatric/Behavioral: Negative  Problem List:     Patient Active Problem List   Diagnosis    Current moderate episode of major depressive disorder (HCC)    Irritable bowel syndrome    Fibromyalgia    Morbid obesity with BMI of 40 0-44 9, adult ([facility])    Chronic fatigue    Skin lesion    Fatty liver    Arthralgia of multiple sites    Chronic bilateral low back pain with bilateral sciatica    Type 2 diabetes mellitus without complication ([facility] [address] )    Pure hypercholesterolemia    [facility], [facility]    MAIDA (generalized anxiety disorder)    Insomnia    Dupuytren's disease of both palm and finger    Iron deficiency anemia, unspecified        Objective:     /78 (BP Location: Left arm, Patient Position: Sitting, Cuff Size: Large)   Pulse 88   Temp (!) 96 2 °F (35 7 °C) (Tympanic)   Resp 20   Ht 5' 5" (1 651 m)   Wt 115 kg (253 lb)   LMP 05/18/2022 (Exact Date)   SpO2 98%   BMI 42 10 kg/m²     Physical Exam  Constitutional:       General: She is not in acute distress  Appearance: She is obese  HENT:      Head: Normocephalic and atraumatic  Right Ear: External ear normal       Left Ear: External ear normal       Nose: Nose normal       Mouth/Throat:      Mouth: Mucous membranes are moist       Pharynx: Oropharynx is clear  Eyes:      Conjunctiva/sclera: Conjunctivae normal    Cardiovascular:      Rate and Rhythm: Normal rate and regular rhythm  Pulses: Normal pulses  Heart sounds: Normal heart sounds  No murmur heard    Pulmonary:      Effort: Pulmonary effort is normal       Breath sounds: Normal breath sounds  No wheezing  Abdominal:      General: Bowel sounds are normal       Palpations: Abdomen is soft  Tenderness: There is abdominal tenderness in the right lower quadrant, suprapubic area and left lower quadrant  Musculoskeletal:         General: Normal range of motion  Cervical back: Neck supple  Skin:     General: Skin is warm and dry  Capillary Refill: Capillary refill takes less than 2 seconds  Neurological:      Mental Status: She is alert and oriented to person, place, and time  Psychiatric:         Mood and Affect: Mood normal          Behavior: Behavior normal          Thought Content: Thought content normal          Judgment: Judgment normal          I spent 15 minutes with this patient      [address] [place] [address]  [facility] [address] [place] [address]

## 2022-06-14 NOTE — PATIENT INSTRUCTIONS
Get labs  Schedule CT scan of abdomen  F/u with Dr Caitlin Rabago    Can take a Zofran tablet 1 20-30minn before CT scan for nausea

## 2022-06-15 ENCOUNTER — APPOINTMENT (OUTPATIENT)
Dept: LAB | Facility: CLINIC | Age: 47
End: 2022-06-15
Payer: COMMERCIAL

## 2022-06-15 DIAGNOSIS — R10.32 LEFT LOWER QUADRANT ABDOMINAL PAIN: ICD-10-CM

## 2022-06-15 DIAGNOSIS — E11.9 TYPE 2 DIABETES MELLITUS WITHOUT COMPLICATION, WITHOUT LONG-TERM CURRENT USE OF INSULIN (HCC): ICD-10-CM

## 2022-06-15 LAB
ALBUMIN SERPL BCP-MCNC: 3.6 G/DL (ref 3.5–5)
ALP SERPL-CCNC: 98 U/L (ref 46–116)
ALT SERPL W P-5'-P-CCNC: 44 U/L (ref 12–78)
ANION GAP SERPL CALCULATED.3IONS-SCNC: 4 MMOL/L (ref 4–13)
AST SERPL W P-5'-P-CCNC: 28 U/L (ref 5–45)
BASOPHILS # BLD AUTO: 0.04 THOUSANDS/ΜL (ref 0–0.1)
BASOPHILS NFR BLD AUTO: 0 % (ref 0–1)
BILIRUB SERPL-MCNC: 0.29 MG/DL (ref 0.2–1)
BUN SERPL-MCNC: 8 MG/DL (ref 5–25)
CALCIUM SERPL-MCNC: 9.4 MG/DL (ref 8.3–10.1)
CHLORIDE SERPL-SCNC: 104 MMOL/L (ref 100–108)
CO2 SERPL-SCNC: 28 MMOL/L (ref 21–32)
CREAT SERPL-MCNC: 0.99 MG/DL (ref 0.6–1.3)
EOSINOPHIL # BLD AUTO: 0.1 THOUSAND/ΜL (ref 0–0.61)
EOSINOPHIL NFR BLD AUTO: 1 % (ref 0–6)
ERYTHROCYTE [DISTWIDTH] IN BLOOD BY AUTOMATED COUNT: 18.6 % (ref 11.6–15.1)
EST. AVERAGE GLUCOSE BLD GHB EST-MCNC: 146 MG/DL
GFR SERPL CREATININE-BSD FRML MDRD: 69 ML/MIN/1.73SQ M
GLUCOSE P FAST SERPL-MCNC: 187 MG/DL (ref 65–99)
HBA1C MFR BLD: 6.7 %
HCT VFR BLD AUTO: 43.4 % (ref 34.8–46.1)
HGB BLD-MCNC: 14.1 G/DL (ref 11.5–15.4)
IMM GRANULOCYTES # BLD AUTO: 0.09 THOUSAND/UL (ref 0–0.2)
IMM GRANULOCYTES NFR BLD AUTO: 1 % (ref 0–2)
LYMPHOCYTES # BLD AUTO: 1.89 THOUSANDS/ΜL (ref 0.6–4.47)
LYMPHOCYTES NFR BLD AUTO: 16 % (ref 14–44)
MCH RBC QN AUTO: 27.3 PG (ref 26.8–34.3)
MCHC RBC AUTO-ENTMCNC: 32.5 G/DL (ref 31.4–37.4)
MCV RBC AUTO: 84 FL (ref 82–98)
MONOCYTES # BLD AUTO: 0.71 THOUSAND/ΜL (ref 0.17–1.22)
MONOCYTES NFR BLD AUTO: 6 % (ref 4–12)
NEUTROPHILS # BLD AUTO: 8.86 THOUSANDS/ΜL (ref 1.85–7.62)
NEUTS SEG NFR BLD AUTO: 76 % (ref 43–75)
NRBC BLD AUTO-RTO: 0 /100 WBCS
PLATELET # BLD AUTO: 372 THOUSANDS/UL (ref 149–390)
PMV BLD AUTO: 9.5 FL (ref 8.9–12.7)
POTASSIUM SERPL-SCNC: 4.1 MMOL/L (ref 3.5–5.3)
PROT SERPL-MCNC: 7.6 G/DL (ref 6.4–8.2)
RBC # BLD AUTO: 5.16 MILLION/UL (ref 3.81–5.12)
SODIUM SERPL-SCNC: 136 MMOL/L (ref 136–145)
WBC # BLD AUTO: 11.69 THOUSAND/UL (ref 4.31–10.16)

## 2022-06-15 PROCEDURE — 83036 HEMOGLOBIN GLYCOSYLATED A1C: CPT

## 2022-06-15 PROCEDURE — 3044F HG A1C LEVEL LT 7.0%: CPT | Performed by: PHYSICIAN ASSISTANT

## 2022-06-15 PROCEDURE — 36415 COLL VENOUS BLD VENIPUNCTURE: CPT

## 2022-06-15 PROCEDURE — 80053 COMPREHEN METABOLIC PANEL: CPT

## 2022-06-15 PROCEDURE — 85025 COMPLETE CBC W/AUTO DIFF WBC: CPT

## 2022-06-16 ENCOUNTER — TELEPHONE (OUTPATIENT)
Dept: INTERNAL MEDICINE CLINIC | Facility: CLINIC | Age: 47
End: 2022-06-16

## 2022-06-16 DIAGNOSIS — K29.60 EROSIVE GASTRITIS: ICD-10-CM

## 2022-06-16 RX ORDER — PANTOPRAZOLE SODIUM 40 MG/1
TABLET, DELAYED RELEASE ORAL
Qty: 30 TABLET | Refills: 11 | Status: SHIPPED | OUTPATIENT
Start: 2022-06-16

## 2022-06-16 NOTE — TELEPHONE ENCOUNTER
----- Message from Glo Lopez [address] sent at 6/16/2022  1:00 PM EDT -----    Please let the patient know that her hemoglobin A1c is 6 7  She should continue on her current medications  Low fat low carb low-cholesterol diet recommended  Weight loss recommended

## 2022-06-27 ENCOUNTER — OFFICE VISIT (OUTPATIENT)
Dept: PSYCHIATRY | Facility: CLINIC | Age: 47
End: 2022-06-27
Payer: COMMERCIAL

## 2022-06-27 DIAGNOSIS — F33.1 MDD (MAJOR DEPRESSIVE DISORDER), RECURRENT EPISODE, MODERATE (HCC): Primary | ICD-10-CM

## 2022-06-27 DIAGNOSIS — F41.1 GAD (GENERALIZED ANXIETY DISORDER): ICD-10-CM

## 2022-06-27 DIAGNOSIS — F51.01 PRIMARY INSOMNIA: ICD-10-CM

## 2022-06-27 PROCEDURE — 99214 OFFICE O/P EST MOD 30 MIN: CPT | Performed by: PHYSICIAN ASSISTANT

## 2022-06-27 RX ORDER — BUPROPION HYDROCHLORIDE 300 MG/1
300 TABLET ORAL EVERY MORNING
Qty: 30 TABLET | Refills: 2 | Status: SHIPPED | OUTPATIENT
Start: 2022-06-27

## 2022-06-27 RX ORDER — RAMELTEON 8 MG/1
8 TABLET ORAL
Qty: 30 TABLET | Refills: 2 | Status: SHIPPED | OUTPATIENT
Start: 2022-06-27

## 2022-06-27 NOTE — PSYCH
This note was not shared with the patient due to reasonable likelihood of causing patient harm    PROGRESS NOTE        Tato Wellington      Name and Date of Birth:  Nahum Chavez 56 y o  1975    Date of Visit: 06/27/22    SUBJECTIVE:  Issac Lagos was seen for follow-up major depression, generalized anxiety, insomnia and for medication management  Feeling "a bit better" with wellbutrin  Reports that depression is improved and she feels slightly less sad  Reports decreased tearfulness although she is tearful at times during exam     Struggles with self-care and worrying more about other people which we discussed  Continues with stressors related to her ex spouse and him not following through with care further children as he is expected  States that her children are all doing well  Some stressors with her daughter who is only 9years old  Her sons are 15 and 12  Sleeping better with rozerem  States that she is now sleeping 6-8 hours consistently at night which is a huge improvement  Appetite is decreased which has not changed  She has not had a change in her weight though  Discussed nutrition and considering seeing a nutritionist due to diabetes which she will consider  Struggles with relationship  with significant other due to long distance  She is taking her medications as prescribed  Denies any adverse effects with Wellbutrin and as noted above has a slight improvement  States she continues to smoke a pack and half a day so this unfortunately has not been decreased  She denies suicidal ideation, intent or plan at present, has no suicidal ideation, intent or plan at present  She denies any auditory hallucinations and visual hallucinations, denies any other delusional thinking, denies any delusional thinking  She denies any side effects from medications      HPI ROS Appetite Changes and Sleep:  Decreased appetite, normal sleep    Review Of Systems:      Constitutional Negative   ENT Negative   Cardiovascular Negative   Respiratory Negative   Gastrointestinal Negative   Genitourinary Negative   Musculoskeletal Negative   Integumentary Negative   Neurological Negative   Endocrine Negative   Other Symptoms Negative and None       Laboratory Results: No results found for this or any previous visit      Substance Abuse History:    Social History     Substance and Sexual Activity   Drug Use No       Family Psychiatric History:     Family History   Problem Relation Age of Onset    Arthritis Mother     Fibromyalgia Mother     Endometriosis Mother     Other Mother         Eye pressure, gallbladder removal    Hypertension Father     Diabetes Father     Diabetes type II Father     Other Father         Gallbladder removal     Pancreatic cancer Maternal Uncle     Esophageal cancer Maternal Grandfather     Heart disease Paternal Grandmother     Hyperthyroidism Paternal Grandmother     COPD Paternal Grandmother     Stroke Neg Hx     Thyroid cancer Neg Hx        The following portions of the patient's history were reviewed and updated as appropriate: past family history, past medical history, past social history, past surgical history and problem list     Social History     Socioeconomic History    Marital status: Legally      Spouse name: Not on file    Number of children: Not on file    Years of education: Not on file    Highest education level: Not on file   Occupational History    Not on file   Tobacco Use    Smoking status: Current Every Day Smoker     Packs/day: 1 50     Types: Cigarettes    Smokeless tobacco: Never Used   Vaping Use    Vaping Use: Never used   Substance and Sexual Activity    Alcohol use: Not Currently     Alcohol/week: 1 0 standard drink     Types: 1 Glasses of wine per week     Comment: does not currently drink ETOH    Drug use: No    Sexual activity: Not Currently     Partners: Male Birth control/protection: Female Sterilization     Comment: has not been sexually active since December   Other Topics Concern    Not on file   Social History Narrative    Not on file     Social Determinants of Health     Financial Resource Strain: Not on file   Food Insecurity: Not on file   Transportation Needs: Not on file   Physical Activity: Not on file   Stress: Not on file   Social Connections: Not on file   Intimate Partner Violence: Not on file   Housing Stability: Not on file     Social History     Social History Narrative    Not on file        Social History     Tobacco History     Smoking Status  Current Every Day Smoker Smoking Frequency  1 5 packs/day Smoking Tobacco Type  Cigarettes    Smokeless Tobacco Use  Never Used          Alcohol History     Alcohol Use Status  Not Currently Drinks/Week  1 Glasses of wine per week Amount  1 0 standard drink of alcohol/wk Comment  does not currently drink ETOH          Drug Use     Drug Use Status  No          Sexual Activity     Sexually Active  Not Currently Partners  Male Birth Control/Protection  Female Sterilization Comment  has not been sexually active since December          Activities of Daily Living    Not Asked                     OBJECTIVE:     Mental Status Evaluation:    Appearance age appropriate, casually dressed   Behavior Calm, cooperative   Speech normal volume, normal pitch   Mood Less depressed   Affect Dysthymic, tearful at times   Thought Processes logical   Associations intact associations   Thought Content normal   Perceptual Disturbances: none   Abnormal Thoughts  Risk Potential Suicidal ideation - None  Homicidal ideation - None  Potential for aggression - No   Orientation oriented to person, place, time/date and situation   Memory recent and remote memory grossly intact   Cosciousness alert and awake   Attention Span attention span and concentration are age appropriate   Intellect Not formally assessed   Insight age appropriate Judgement good    Muscle Strength and  Gait Steady gait   Language no difficulty naming common objects   Fund of Knowledge displays adequate knowledge of current events   Pain none   Pain Scale 0       Assessment/Plan:       Diagnoses and all orders for this visit:    MDD (major depressive disorder), recurrent episode, moderate (HCC)  -     buPROPion (Wellbutrin XL) 300 mg 24 hr tablet; Take 1 tablet (300 mg total) by mouth every morning  -     ramelteon (ROZEREM) 8 mg tablet; Take 1 tablet (8 mg total) by mouth daily at bedtime    MAIDA (generalized anxiety disorder)  -     buPROPion (Wellbutrin XL) 300 mg 24 hr tablet; Take 1 tablet (300 mg total) by mouth every morning  -     ramelteon (ROZEREM) 8 mg tablet; Take 1 tablet (8 mg total) by mouth daily at bedtime    Primary insomnia  -     ramelteon (ROZEREM) 8 mg tablet; Take 1 tablet (8 mg total) by mouth daily at bedtime          Treatment Recommendations/Precautions:  Cymbalta 120 mg total per day prescribed by PCP for fibromyalgia    Wellbutrin  mg q a m , increase to 300 mg q a m  Continue Rozerem 8 mg at bedtime  Patient in agreement with above treatment plan and verbalized understanding  Will follow-up in two months and she will call me sooner if any questions or concerns      Risks/Benefits      Risks, Benefits And Possible Side Effects Of Medications:    Risks, benefits, and possible side effects of medications explained to patient and patient verbalizes understanding and agreement for treatment      Controlled Medication Discussion:     Not applicable    Psychotherapy Provided:     Individual psychotherapy provided: No

## 2022-06-29 ENCOUNTER — CONSULT (OUTPATIENT)
Dept: GYNECOLOGY | Facility: CLINIC | Age: 47
End: 2022-06-29
Payer: COMMERCIAL

## 2022-06-29 VITALS
DIASTOLIC BLOOD PRESSURE: 78 MMHG | BODY MASS INDEX: 42.15 KG/M2 | WEIGHT: 253 LBS | HEIGHT: 65 IN | HEART RATE: 88 BPM | SYSTOLIC BLOOD PRESSURE: 122 MMHG

## 2022-06-29 DIAGNOSIS — N94.6 DYSMENORRHEA: ICD-10-CM

## 2022-06-29 DIAGNOSIS — N92.0 MENORRHAGIA WITH REGULAR CYCLE: ICD-10-CM

## 2022-06-29 PROCEDURE — 99214 OFFICE O/P EST MOD 30 MIN: CPT | Performed by: OBSTETRICS & GYNECOLOGY

## 2022-06-29 PROCEDURE — 3008F BODY MASS INDEX DOCD: CPT | Performed by: PHYSICIAN ASSISTANT

## 2022-06-29 RX ORDER — TRANEXAMIC ACID 650 1/1
650 TABLET ORAL 3 TIMES DAILY
Qty: 15 TABLET | Refills: 3 | Status: SHIPPED | OUTPATIENT
Start: 2022-06-29 | End: 2022-07-04

## 2022-06-29 NOTE — PROGRESS NOTES
Assessment/Plan:  I recommended Quoc Gold return to the office for saline infusion hysterosonography to evaluate the lower uterine segment since she has had 2 prior  sections  Assuming this is normal we did discuss treatment options for menorrhagia including medical management versus surgical management with either an endometrial ablation or hysterectomy  Assuming there was adequate lower segment patient has desired to proceed with NovaSure ablation  Diagnoses and all orders for this visit:    Menorrhagia with regular cycle  -     -     Tranexamic Acid 650 MG TABS; Take 1 tablet (650 mg total) by mouth 3 (three) times a day for 5 days    Dysmenorrhea  -          Subjective:      Patient ID: Luis Daniel Ha is a 56 y o  female  HPI G3 P 3003,  X 1; C section X2, tubal, new patient referred to office by Dr Sara Wei, for evaluation and management of menorrhagia  Patient states that she has been experiencing heavy flows for the past 7 years  The 2nd to 4th day are very heavy with passage of clots  She also is experiencing worsening dysmenorrhea fatigue and pelvic pain  She has had 2 iron infusions recently  Last hemoglobin was 14 1    She did have a ultrasound done in  which revealed endometrial thickness of 5 mm  The following portions of the patient's history were reviewed and updated as appropriate:   She  has a past medical history of Arthritis, Diabetes 1 5, managed as type 2 ([address] ), Diabetes mellitus ([address] ), Fibromyalgia, Hiatal hernia, HPV (human papilloma virus) infection, Irritable bowel syndrome, Lactose intolerance, and Prediabetes    She   Patient Active Problem List    Diagnosis Date Noted    Iron deficiency anemia, unspecified 2022    Dupuytren's disease of both palm and finger 2022    MAIDA (generalized anxiety disorder) 2022    Insomnia 2022    Depression, recurrent ([address] ) 2022    Type 2 diabetes mellitus without complication ([place] [address] )     Pure hypercholesterolemia 2019    Chronic bilateral low back pain with bilateral sciatica 2019    Fatty liver 2018    Arthralgia of multiple sites 2018    Morbid obesity with BMI of 40 0-44 9, adult ([place] [address] ) 2018    Chronic fatigue 2018    Skin lesion 2018    Current moderate episode of major depressive disorder ([place] [address] ) 2018    Fibromyalgia 2018    Irritable bowel syndrome 2017     She  has a past surgical history that includes  section; Colonoscopy; pr esophagogastroduodenoscopy transoral diagnostic (N/A, 2017); Tonsillectomy; Tubal ligation; pr lap,cholecystectomy/graph (N/A, 2018); Adenoidectomy; Cervical biopsy; and IR biopsy bone marrow (2022)  Her family history includes Arthritis in her mother; COPD in her paternal grandmother; Diabetes in her father; Diabetes type II in her father; Endometriosis in her mother; Esophageal cancer in her maternal grandfather; Fibromyalgia in her mother; Heart disease in her paternal grandmother; Hypertension in her father; Hyperthyroidism in her paternal grandmother; Other in her father and mother; Pancreatic cancer in her maternal uncle  She  reports that she has been smoking cigarettes  She has been smoking about 1 50 packs per day  She has never used smokeless tobacco  She reports previous alcohol use of about 1 0 standard drink of alcohol per week  She reports that she does not use drugs    Current Outpatient Medications   Medication Sig Dispense Refill    pantoprazole (PROTONIX) 40 mg tablet TAKE 1 TABLET BY MOUTH EVERY DAY 30 tablet 11    Tranexamic Acid 650 MG TABS Take 1 tablet (650 mg total) by mouth 3 (three) times a day for 5 days 15 tablet 3    acetaminophen (TYLENOL) 325 mg tablet Take 325 mg by mouth every 6 (six) hours as needed for mild pain        buPROPion (Wellbutrin XL) 300 mg 24 hr tablet Take 1 tablet (300 mg total) by mouth every morning 30 tablet 2    Cetirizine HCl 10 MG CAPS Take by mouth daily        dicyclomine (BENTYL) 10 mg capsule TAKE 1 CAPSULE BY MOUTH 3 TIMES DAILY  90 capsule 8    DULoxetine (CYMBALTA) 60 mg delayed release capsule Take 2 capsules (120 mg total) by mouth daily 60 capsule 5    gabapentin (NEURONTIN) 300 mg capsule TAKE 1 CAPSULE 3 TIMES A DAY FOR 7 DAYS, THEN 2 CAPSULES 3 TIMES A DAY FOR 23 DAYS  159 capsule 0    glyBURIDE (DIABETA) 2 5 mg tablet TAKE 1 TABLET BY MOUTH DAILY WITH BREAKFAST  30 tablet 1    magnesium oxide (MAG-OX) 400 mg Take 1 tablet (400 mg total) by mouth daily 30 tablet 0    montelukast (SINGULAIR) 10 mg tablet TAKE 1 TABLET BY MOUTH EVERYDAY AT BEDTIME 30 tablet 3    olopatadine HCl (PATADAY) 0 2 % opth drops Apply 0 2 % to eye as needed        ondansetron (Zofran ODT) 4 mg disintegrating tablet Take 1 tablet (4 mg total) by mouth every 6 (six) hours as needed for nausea or vomiting 20 tablet 0    ramelteon (ROZEREM) 8 mg tablet Take 1 tablet (8 mg total) by mouth daily at bedtime 30 tablet 2     No current facility-administered medications for this visit  Current Outpatient Medications on File Prior to Visit   Medication Sig    pantoprazole (PROTONIX) 40 mg tablet TAKE 1 TABLET BY MOUTH EVERY DAY    acetaminophen (TYLENOL) 325 mg tablet Take 325 mg by mouth every 6 (six) hours as needed for mild pain      buPROPion (Wellbutrin XL) 300 mg 24 hr tablet Take 1 tablet (300 mg total) by mouth every morning    Cetirizine HCl 10 MG CAPS Take by mouth daily      dicyclomine (BENTYL) 10 mg capsule TAKE 1 CAPSULE BY MOUTH 3 TIMES DAILY   DULoxetine (CYMBALTA) 60 mg delayed release capsule Take 2 capsules (120 mg total) by mouth daily    gabapentin (NEURONTIN) 300 mg capsule TAKE 1 CAPSULE 3 TIMES A DAY FOR 7 DAYS, THEN 2 CAPSULES 3 TIMES A DAY FOR 23 DAYS   glyBURIDE (DIABETA) 2 5 mg tablet TAKE 1 TABLET BY MOUTH DAILY WITH BREAKFAST      magnesium oxide (MAG-OX) 400 mg Take 1 tablet (400 mg total) by mouth daily    montelukast (SINGULAIR) 10 mg tablet TAKE 1 TABLET BY MOUTH EVERYDAY AT BEDTIME    olopatadine HCl (PATADAY) 0 2 % opth drops Apply 0 2 % to eye as needed      ondansetron (Zofran ODT) 4 mg disintegrating tablet Take 1 tablet (4 mg total) by mouth every 6 (six) hours as needed for nausea or vomiting    ramelteon (ROZEREM) 8 mg tablet Take 1 tablet (8 mg total) by mouth daily at bedtime     No current facility-administered medications on file prior to visit  She is allergic to nuts - food allergy, other, desogestrel-ethinyl estradiol, pineapple - food allergy, dog epithelium allergy skin test, dust mite extract, and ortho tri-cyclen (28) [norgestimate-eth estradiol]       Review of Systems   Constitutional: Positive for fatigue  Negative for fever  Gastrointestinal: Negative  Genitourinary: Positive for menstrual problem and pelvic pain  Objective:      /78   Pulse 88   Ht 5' 5" (1 651 m)   Wt 115 kg (253 lb)   BMI 42 10 kg/m²          Physical Exam  Vitals reviewed  Neurological:      Mental Status: She is alert         will perform remainder physical exam when patient returns for T VS SIS

## 2022-06-29 NOTE — LETTER
2022     [facility], [address] [place] [address]    Patient: Daphne Steinberg   YOB: 1975   Date of Visit: 2022       Dear Marlena/Darrell,    Thank you for referring Andres Mora to me for evaluation  Below are my notes for this consultation  If you have questions, please do not hesitate to call me  I look forward to following your patient along with you  Sincerely,        Sherine Salas DO        CC: MD Sherine Gama DO  2022 11:34 AM  Incomplete  Assessment/Plan:  I recommended Gonzalez Shelton return to the office for saline infusion hysterosonography to evaluate the lower uterine segment since she has had 2 prior  sections  Assuming this is normal we did discuss treatment options for menorrhagia including medical management versus surgical management with either an endometrial ablation or hysterectomy  Assuming there was adequate lower segment patient has desired to proceed with NovaSure ablation  Diagnoses and all orders for this visit:    Menorrhagia with regular cycle  -     -     Tranexamic Acid 650 MG TABS; Take 1 tablet (650 mg total) by mouth 3 (three) times a day for 5 days    Dysmenorrhea  -          Subjective:      Patient ID: Daphne Steinberg is a 56 y o  female  HPI G3 P 3003,  X 1; C section X2, tubal, new patient referred to office by Dr Lidia Summers, for evaluation and management of menorrhagia  Patient states that she has been experiencing heavy flows for the past 7 years  The 2nd to 4th day are very heavy with passage of clots  She also is experiencing worsening dysmenorrhea fatigue and pelvic pain  She has had 2 iron infusions recently  Last hemoglobin was 14 1    She did have a ultrasound done in  which revealed endometrial thickness of 5 mm      The following portions of the patient's history were reviewed and updated as appropriate:   She  has a past medical history of Arthritis, Diabetes 1 5, managed as type 2 ([place] [address] ), Diabetes mellitus ([place] [address] ), Fibromyalgia, Hiatal hernia, HPV (human papilloma virus) infection, Irritable bowel syndrome, Lactose intolerance, and Prediabetes  She   Patient Active Problem List    Diagnosis Date Noted    Iron deficiency anemia, unspecified 2022    Dupuytren's disease of both palm and finger 2022    MAIDA (generalized anxiety disorder) 2022    Insomnia 2022    Depression, recurrent ([place] [address] ) 2022    Type 2 diabetes mellitus without complication ([place] [address] )     Pure hypercholesterolemia 2019    Chronic bilateral low back pain with bilateral sciatica 2019    Fatty liver 2018    Arthralgia of multiple sites 2018    Morbid obesity with BMI of 40 0-44 9, adult ([place] [address] ) 2018    Chronic fatigue 2018    Skin lesion 2018    Current moderate episode of major depressive disorder ([place] [address] ) 2018    Fibromyalgia 2018    Irritable bowel syndrome 2017     She  has a past surgical history that includes  section; Colonoscopy; pr esophagogastroduodenoscopy transoral diagnostic (N/A, 2017); Tonsillectomy; Tubal ligation; pr lap,cholecystectomy/graph (N/A, 2018); Adenoidectomy; Cervical biopsy; and IR biopsy bone marrow (2022)  Her family history includes Arthritis in her mother; COPD in her paternal grandmother; Diabetes in her father; Diabetes type II in her father; Endometriosis in her mother; Esophageal cancer in her maternal grandfather; Fibromyalgia in her mother; Heart disease in her paternal grandmother; Hypertension in her father; Hyperthyroidism in her paternal grandmother; Other in her father and mother; Pancreatic cancer in her maternal uncle  She  reports that she has been smoking cigarettes  She has been smoking about 1 50 packs per day   She has never used smokeless tobacco  She reports previous alcohol use of about 1 0 standard drink of alcohol per week  She reports that she does not use drugs  Current Outpatient Medications   Medication Sig Dispense Refill    pantoprazole (PROTONIX) 40 mg tablet TAKE 1 TABLET BY MOUTH EVERY DAY 30 tablet 11    Tranexamic Acid 650 MG TABS Take 1 tablet (650 mg total) by mouth 3 (three) times a day for 5 days 15 tablet 3    acetaminophen (TYLENOL) 325 mg tablet Take 325 mg by mouth every 6 (six) hours as needed for mild pain        buPROPion (Wellbutrin XL) 300 mg 24 hr tablet Take 1 tablet (300 mg total) by mouth every morning 30 tablet 2    Cetirizine HCl 10 MG CAPS Take by mouth daily        dicyclomine (BENTYL) 10 mg capsule TAKE 1 CAPSULE BY MOUTH 3 TIMES DAILY  90 capsule 8    DULoxetine (CYMBALTA) 60 mg delayed release capsule Take 2 capsules (120 mg total) by mouth daily 60 capsule 5    gabapentin (NEURONTIN) 300 mg capsule TAKE 1 CAPSULE 3 TIMES A DAY FOR 7 DAYS, THEN 2 CAPSULES 3 TIMES A DAY FOR 23 DAYS  159 capsule 0    glyBURIDE (DIABETA) 2 5 mg tablet TAKE 1 TABLET BY MOUTH DAILY WITH BREAKFAST  30 tablet 1    magnesium oxide (MAG-OX) 400 mg Take 1 tablet (400 mg total) by mouth daily 30 tablet 0    montelukast (SINGULAIR) 10 mg tablet TAKE 1 TABLET BY MOUTH EVERYDAY AT BEDTIME 30 tablet 3    olopatadine HCl (PATADAY) 0 2 % opth drops Apply 0 2 % to eye as needed        ondansetron (Zofran ODT) 4 mg disintegrating tablet Take 1 tablet (4 mg total) by mouth every 6 (six) hours as needed for nausea or vomiting 20 tablet 0    ramelteon (ROZEREM) 8 mg tablet Take 1 tablet (8 mg total) by mouth daily at bedtime 30 tablet 2     No current facility-administered medications for this visit       Current Outpatient Medications on File Prior to Visit   Medication Sig    pantoprazole (PROTONIX) 40 mg tablet TAKE 1 TABLET BY MOUTH EVERY DAY    acetaminophen (TYLENOL) 325 mg tablet Take 325 mg by mouth every 6 (six) hours as needed for mild pain  buPROPion (Wellbutrin XL) 300 mg 24 hr tablet Take 1 tablet (300 mg total) by mouth every morning    Cetirizine HCl 10 MG CAPS Take by mouth daily      dicyclomine (BENTYL) 10 mg capsule TAKE 1 CAPSULE BY MOUTH 3 TIMES DAILY   DULoxetine (CYMBALTA) 60 mg delayed release capsule Take 2 capsules (120 mg total) by mouth daily    gabapentin (NEURONTIN) 300 mg capsule TAKE 1 CAPSULE 3 TIMES A DAY FOR 7 DAYS, THEN 2 CAPSULES 3 TIMES A DAY FOR 23 DAYS   glyBURIDE (DIABETA) 2 5 mg tablet TAKE 1 TABLET BY MOUTH DAILY WITH BREAKFAST   magnesium oxide (MAG-OX) 400 mg Take 1 tablet (400 mg total) by mouth daily    montelukast (SINGULAIR) 10 mg tablet TAKE 1 TABLET BY MOUTH EVERYDAY AT BEDTIME    olopatadine HCl (PATADAY) 0 2 % opth drops Apply 0 2 % to eye as needed      ondansetron (Zofran ODT) 4 mg disintegrating tablet Take 1 tablet (4 mg total) by mouth every 6 (six) hours as needed for nausea or vomiting    ramelteon (ROZEREM) 8 mg tablet Take 1 tablet (8 mg total) by mouth daily at bedtime     No current facility-administered medications on file prior to visit  She is allergic to nuts - food allergy, other, desogestrel-ethinyl estradiol, pineapple - food allergy, dog epithelium allergy skin test, dust mite extract, and ortho tri-cyclen (28) [norgestimate-eth estradiol]       Review of Systems   Constitutional: Positive for fatigue  Negative for fever  Gastrointestinal: Negative  Genitourinary: Positive for menstrual problem and pelvic pain  Objective:      /78   Pulse 88   Ht 5' 5" (1 651 m)   Wt 115 kg (253 lb)   BMI 42 10 kg/m²          Physical Exam  Vitals reviewed  Neurological:      Mental Status: She is alert         will perform remainder physical exam when patient returns for T VS SIS

## 2022-06-30 ENCOUNTER — SOCIAL WORK (OUTPATIENT)
Dept: BEHAVIORAL/MENTAL HEALTH CLINIC | Facility: CLINIC | Age: 47
End: 2022-06-30
Payer: COMMERCIAL

## 2022-06-30 DIAGNOSIS — F33.1 MODERATE EPISODE OF RECURRENT MAJOR DEPRESSIVE DISORDER (HCC): Primary | ICD-10-CM

## 2022-06-30 DIAGNOSIS — F41.1 GAD (GENERALIZED ANXIETY DISORDER): ICD-10-CM

## 2022-06-30 DIAGNOSIS — F33.9 DEPRESSION, RECURRENT (HCC): ICD-10-CM

## 2022-06-30 PROCEDURE — 90834 PSYTX W PT 45 MINUTES: CPT | Performed by: SOCIAL WORKER

## 2022-06-30 NOTE — PSYCH
Start Time 9:55AM-10:40AM  Psychotherapy Provided: Individual Psychotherapy 45 minutes   Length of time in session: 45 minutes, follow up in 3-4week    No diagnosis found  Goals addressed in session: Goal 1   Pain:  None    none  0  Current suicide risk : Low   D  Dennis Delgado returned from vacation and the kids were supposed to be on vacation with their father and that did not happen and she has the kids most of the days this week  Her ex signed her son up for soccer camp  She states that her ex told her he needs her help with the camp  She is appropriately dressed in a summery manner, being overweight but well groomed  Her mood is anxious and her affect is congruent  Her thought process is logical and speech is normal rate and normal volume  Her disability/shelter has not kicked in yet  Her children have not met her male friend as of yet  She would love to say they her and this man have a future but she is seeing a selfish side of him since he had a work accident and has been sick  Her emotions are raw since her own divorce  She does not know whether this relationship ill work in the long run  He was supposed to move to [place] this summer as he lives in [place]  Her dad is on dialysis and her mother needs to be there for him  She does not expect her parents to be there for her during her health issues  Support was offered as well as boundary setting to protect her own emotional well being  Koby Sarah appears to be making progress as evidenced by her not becoming teary eyed during the session despite multiple issues  Area of concern is her being a support but not feeling supported in her personal life  Koby Sraah will follow up with medical testing and follow up with this writer in 3 weeks  Behavioral Health Treatment Plan [facility]: Diagnosis and Treatment Plan explained to Jearldine Barthel relates understanding diagnosis and is agreeable to Treatment Plan   Yes

## 2022-06-30 NOTE — BH TREATMENT PLAN
William Tricia  1975       Date of Initial Treatment Plan: 06/30/2022  Date of Current Treatment Plan: 06/30/22    Treatment Plan Number  1    Strengths/Personal Resources for Self Care: Maryann Araiza is a good mother, has a strong work ethic, is a teacher and is a genuinely positive person  Diagnosis:   1  Moderate episode of recurrent major depressive disorder ([facility] [address] )     2  [facility]     3  MAIDA (generalized anxiety disorder)         Area of Needs: Maryann Araiza lost her job, her marriage broke up due to her 's infidelity as well as being financially going into hard times  Long Term Goal 1: A Reduce the frequency, intensity and duration of symptoms of anxiety  Target Date: 11/30/2022  Completion Date: N/A         Short Term Objectives for Goal 1: A Maryann Araiza will implement Mindfulness techniques daily for the next month, documenting results  Long Term Goal 2: N/A    Target Date: N/A  Completion Date: N/A    Short Term Objectives for Goal 2: N/A         Long Term Goal # 3: N/A     Target Date: N/A  Completion Date: N/A    Short Term Objectives for Goal 3: N/A    GOAL 1: Modality: Individual 1-2x per month   Completion Date N/A and The person(s) responsible for carrying out the plan is  Maryann Araiza and the deepali    GOAL 2: Modality: N/A    GOAL 3: Modality: N/A      Behavioral Health Treatment Plan  Luke: Diagnosis and Treatment Plan explained to Joshcharmaine David relates understanding diagnosis and is agreeable to Treatment Plan         Client Comments : Please share your thoughts, feelings, need and/or experiences regarding your treatment plan: " I need to relax with everything going on with me health wise "

## 2022-07-20 ENCOUNTER — OFFICE VISIT (OUTPATIENT)
Dept: GYNECOLOGY | Facility: CLINIC | Age: 47
End: 2022-07-20
Payer: COMMERCIAL

## 2022-07-20 ENCOUNTER — ULTRASOUND (OUTPATIENT)
Dept: GYNECOLOGY | Facility: CLINIC | Age: 47
End: 2022-07-20
Payer: COMMERCIAL

## 2022-07-20 DIAGNOSIS — N92.0 MENORRHAGIA WITH REGULAR CYCLE: Primary | ICD-10-CM

## 2022-07-20 DIAGNOSIS — N94.6 DYSMENORRHEA: ICD-10-CM

## 2022-07-20 PROCEDURE — 58100 BIOPSY OF UTERUS LINING: CPT | Performed by: OBSTETRICS & GYNECOLOGY

## 2022-07-20 PROCEDURE — 76831 ECHO EXAM UTERUS: CPT | Performed by: OBSTETRICS & GYNECOLOGY

## 2022-07-20 PROCEDURE — 58340 CATHETER FOR HYSTEROGRAPHY: CPT | Performed by: OBSTETRICS & GYNECOLOGY

## 2022-07-20 PROCEDURE — 99213 OFFICE O/P EST LOW 20 MIN: CPT | Performed by: OBSTETRICS & GYNECOLOGY

## 2022-07-20 PROCEDURE — 76830 TRANSVAGINAL US NON-OB: CPT | Performed by: OBSTETRICS & GYNECOLOGY

## 2022-07-20 PROCEDURE — 88305 TISSUE EXAM BY PATHOLOGIST: CPT | Performed by: PATHOLOGY

## 2022-07-20 NOTE — PROGRESS NOTES
Assessment/Plan:         Diagnoses and all orders for this visit:    Menorrhagia with regular cycle:  Reviewed ultrasound findings with patient  We had hoped that she would be a candidate for endometrial ablation however with lower uterine segment less than 5 mm an endometrial ablation is contraindicated  We discussed other options including TXA versus hormonal treatment versus a hysterectomy  Patient has opted to try TXA  Dysmenorrhea        Subjective:      Patient ID: Adria Hernadez is a 56 y o  female  HPI  G3 P 3003,  X 1; C section X2, tubal, new patient referred to office by Dr Ileana Deluna, for evaluation and management of menorrhagia  Patient states that she has been experiencing heavy flows for the past 7 years  The 2nd to 4th day are very heavy with passage of clots  She also is experiencing worsening dysmenorrhea fatigue and pelvic pain  She has had 2 iron infusions recently    Last hemoglobin was 14 1    Advised to return to the office for transvaginal scan possible biopsy possible saline infusion hysterosonography;      AMB US Pelvic Non OB     Date/Time: 2022 8:34 AM  Performed by: Catrina Hernandez  Authorized by: Merle Miles DO   Universal Protocol:  Patient identity confirmed: verbally with patient        Procedure details:     Technique:  Transvaginal US, Non-OB    Position: lithotomy exam    Uterine findings:     Length (cm): 10 23    Height (cm):  5 62    Width (cm):  6 23    Endometrial stripe: identified      Endometrium thickness (mm):  17 03  Left ovary findings:     Left ovary:  Visualized    Length (cm): 3 13    Height (cm): 1 97    Width (cm): 2 44  Right ovary findings:     Right ovary:  Visualized    Length (cm): 3 62    Height (cm): 1 65    Width (cm): 2 4  Other findings:     Free pelvic fluid: not identified      Free peritoneal fluid: not identified    Post-Procedure Details:     Impression:  Anteverted uterus is inhomogeneous throughout without fibroids  The bilateral ovaries appear within normal limits  No free fluid  The  scar appears to be 4 7mm from the endometrium  Tolerance:   Tolerated well, no immediate complications    Complications: no complications    Additional Procedure Comments:      Compared with ultrasound performed 2022     GE Vol[employer]on P8 transvaginal transducer RIC5-RA with Serial Number [id number] was used during procedure and subsequently cleaned with high level disinfection utilizing the [employer]       Ultrasound performed at:   [place] [address] for [facility] [address]  [address] [facility]  [address] [facility], [address] [facility]  Phone: [phone]  Fax:  [phone]     Sonohysterogram     Date/Time: 2022 8:43 AM  Performed by: Shelia Rice DO  Authorized by: Shelia Rice DO   Universal Protocol:  Patient identity confirmed: verbally with patient        Pre-procedure:     Prepped with: Betadine    Procedure:     Cervix cleaned and prepped: yes      Uterus sounded: yes      Catheter inserted: yes      Uterine cavity distended with saline: yes    Post-procedure:     Patient observed: yes      Post procedure instructions given to patient: yes      Patient tolerated procedure well with no complications: yes    Comments:      Sonohysterogram demonstrates  no submucosal polyps or fibroids  Endometrial biopsy     Date/Time: 2022 8:43 AM  Performed by: Shelia Rice DO  Authorized by: Shelia Rice DO   Universal Protocol:  Patient understanding: patient states understanding of the procedure being performed  Patient identity confirmed: verbally with patient        Procedure:     Procedure: endometrial biopsy with Pipelle      A bivalve speculum was placed in the vagina: yes      Cervix cleaned and prepped: yes      Specimen collected: specimen collected and sent to pathology      Patient tolerated procedure well with no complications: yes        The following portions of the patient's history were reviewed and updated as appropriate:   She  has a past medical history of Arthritis, Diabetes 1 5, managed as type 2 ([place] [address] ), Diabetes mellitus ([place] [address] ), Fibromyalgia, Hiatal hernia, HPV (human papilloma virus) infection, Irritable bowel syndrome, Lactose intolerance, and Prediabetes  She   Patient Active Problem List    Diagnosis Date Noted    Iron deficiency anemia, unspecified 2022    Dupuytren's disease of both palm and finger 2022    MAIDA (generalized anxiety disorder) 2022    Insomnia 2022    Depression, recurrent ([place] [address] ) 2022    Type 2 diabetes mellitus without complication ([place] [address] )     Pure hypercholesterolemia 2019    Chronic bilateral low back pain with bilateral sciatica 2019    Fatty liver 2018    Arthralgia of multiple sites 2018    Morbid obesity with BMI of 40 0-44 9, adult ([place] [address] ) 2018    Chronic fatigue 2018    Skin lesion 2018    Current moderate episode of major depressive disorder ([place] [address] ) 2018    Fibromyalgia 2018    Irritable bowel syndrome 2017     She  has a past surgical history that includes  section; Colonoscopy; pr esophagogastroduodenoscopy transoral diagnostic (N/A, 2017); Tonsillectomy; Tubal ligation; pr lap,cholecystectomy/graph (N/A, 2018); Adenoidectomy; Cervical biopsy; and IR biopsy bone marrow (2022)  Her family history includes Arthritis in her mother; COPD in her paternal grandmother; Diabetes in her father; Diabetes type II in her father; Endometriosis in her mother; Esophageal cancer in her maternal grandfather; Fibromyalgia in her mother; Heart disease in her paternal grandmother; Hypertension in her father; Hyperthyroidism in her paternal grandmother; Other in her father and mother; Pancreatic cancer in her maternal uncle  She  reports that she has been smoking cigarettes   She has been smoking about 1 50 packs per day  She has never used smokeless tobacco  She reports previous alcohol use of about 1 0 standard drink of alcohol per week  She reports that she does not use drugs  Current Outpatient Medications   Medication Sig Dispense Refill    pantoprazole (PROTONIX) 40 mg tablet TAKE 1 TABLET BY MOUTH EVERY DAY 30 tablet 11    acetaminophen (TYLENOL) 325 mg tablet Take 325 mg by mouth every 6 (six) hours as needed for mild pain        buPROPion (Wellbutrin XL) 300 mg 24 hr tablet Take 1 tablet (300 mg total) by mouth every morning 30 tablet 2    Cetirizine HCl 10 MG CAPS Take by mouth daily        dicyclomine (BENTYL) 10 mg capsule TAKE 1 CAPSULE BY MOUTH 3 TIMES DAILY  90 capsule 8    DULoxetine (CYMBALTA) 60 mg delayed release capsule Take 2 capsules (120 mg total) by mouth daily 60 capsule 5    gabapentin (NEURONTIN) 300 mg capsule TAKE 1 CAPSULE 3 TIMES A DAY FOR 7 DAYS, THEN 2 CAPSULES 3 TIMES A DAY FOR 23 DAYS  159 capsule 0    glyBURIDE (DIABETA) 2 5 mg tablet TAKE 1 TABLET BY MOUTH DAILY WITH BREAKFAST  30 tablet 1    magnesium oxide (MAG-OX) 400 mg Take 1 tablet (400 mg total) by mouth daily 30 tablet 0    montelukast (SINGULAIR) 10 mg tablet TAKE 1 TABLET BY MOUTH EVERYDAY AT BEDTIME 30 tablet 3    olopatadine HCl (PATADAY) 0 2 % opth drops Apply 0 2 % to eye as needed        ondansetron (Zofran ODT) 4 mg disintegrating tablet Take 1 tablet (4 mg total) by mouth every 6 (six) hours as needed for nausea or vomiting 20 tablet 0    ramelteon (ROZEREM) 8 mg tablet Take 1 tablet (8 mg total) by mouth daily at bedtime 30 tablet 2     No current facility-administered medications for this visit       Current Outpatient Medications on File Prior to Visit   Medication Sig    pantoprazole (PROTONIX) 40 mg tablet TAKE 1 TABLET BY MOUTH EVERY DAY    acetaminophen (TYLENOL) 325 mg tablet Take 325 mg by mouth every 6 (six) hours as needed for mild pain      buPROPion (Wellbutrin XL) 300 mg 24 hr tablet Take 1 tablet (300 mg total) by mouth every morning    Cetirizine HCl 10 MG CAPS Take by mouth daily      dicyclomine (BENTYL) 10 mg capsule TAKE 1 CAPSULE BY MOUTH 3 TIMES DAILY   DULoxetine (CYMBALTA) 60 mg delayed release capsule Take 2 capsules (120 mg total) by mouth daily    gabapentin (NEURONTIN) 300 mg capsule TAKE 1 CAPSULE 3 TIMES A DAY FOR 7 DAYS, THEN 2 CAPSULES 3 TIMES A DAY FOR 23 DAYS   glyBURIDE (DIABETA) 2 5 mg tablet TAKE 1 TABLET BY MOUTH DAILY WITH BREAKFAST   magnesium oxide (MAG-OX) 400 mg Take 1 tablet (400 mg total) by mouth daily    montelukast (SINGULAIR) 10 mg tablet TAKE 1 TABLET BY MOUTH EVERYDAY AT BEDTIME    olopatadine HCl (PATADAY) 0 2 % opth drops Apply 0 2 % to eye as needed      ondansetron (Zofran ODT) 4 mg disintegrating tablet Take 1 tablet (4 mg total) by mouth every 6 (six) hours as needed for nausea or vomiting    ramelteon (ROZEREM) 8 mg tablet Take 1 tablet (8 mg total) by mouth daily at bedtime     No current facility-administered medications on file prior to visit  She is allergic to nuts - food allergy, other, desogestrel-ethinyl estradiol, pineapple - food allergy, dog epithelium allergy skin test, dust mite extract, and ortho tri-cyclen (28) [norgestimate-eth estradiol]       Review of Systems      Objective: There were no vitals taken for this visit  Physical Exam  Abdominal:      Hernia: There is no hernia in the left inguinal area or right inguinal area  Genitourinary:     General: Normal vulva  Labia:         Right: No rash, tenderness or lesion  Left: No rash, tenderness or lesion  Vagina: Normal       Cervix: Normal       Uterus: Normal        Adnexa:         Right: No mass, tenderness or fullness  Left: No mass, tenderness or fullness  Lymphadenopathy:      Lower Body: No right inguinal adenopathy  No left inguinal adenopathy

## 2022-07-20 NOTE — PROGRESS NOTES
AMB US Pelvic Non OB    Date/Time: 2022 8:34 AM  Performed by: Maria Guadalupe Donald  Authorized by: Qi Rodriguez DO   Universal Protocol:  Patient identity confirmed: verbally with patient      Procedure details:     Technique:  Transvaginal US, Non-OB    Position: lithotomy exam    Uterine findings:     Length (cm): 10 23    Height (cm):  5 62    Width (cm):  6 23    Endometrial stripe: identified      Endometrium thickness (mm):  17 03  Left ovary findings:     Left ovary:  Visualized    Length (cm): 3 13    Height (cm): 1 97    Width (cm): 2 44  Right ovary findings:     Right ovary:  Visualized    Length (cm): 3 62    Height (cm): 1 65    Width (cm): 2 4  Other findings:     Free pelvic fluid: not identified      Free peritoneal fluid: not identified    Post-Procedure Details:     Impression:  Anteverted uterus is inhomogeneous throughout without fibroids  The bilateral ovaries appear within normal limits  No free fluid  The  scar appears to be 4 7mm from the endometrium  Tolerance: Tolerated well, no immediate complications    Complications: no complications    Additional Procedure Comments:      Compared with ultrasound performed 2022    [employer] P8 transvaginal transducer RIC5-RA with Serial Number [id number] was used during procedure and subsequently cleaned with high level disinfection utilizing the [employer]       Ultrasound performed at:     [facility] [address]  [address] [facility]  [address], [address]   Phone: [phone]  Fax:  [phone]    Sonohysterogram    Date/Time: 2022 8:43 AM  Performed by: Qi Rodriguez DO  Authorized by: Qi Rodriguez DO   Universal Protocol:  Patient identity confirmed: verbally with patient      Pre-procedure:     Prepped with: Betadine    Procedure:     Cervix cleaned and prepped: yes      Uterus sounded: yes      Catheter inserted: yes      Uterine cavity distended with saline: yes    Post-procedure:     Patient observed: yes      Post procedure instructions given to patient: yes      Patient tolerated procedure well with no complications: yes    Comments:      Sonohysterogram demonstrates a smooth appearing endometrium     Endometrial biopsy    Date/Time: 7/20/2022 8:43 AM  Performed by: Marisela Mcduffie DO  Authorized by: Marisela Mcduffie DO   Universal Protocol:  Patient understanding: patient states understanding of the procedure being performed  Patient identity confirmed: verbally with patient      Procedure:     Procedure: endometrial biopsy with Pipelle      A bivalve speculum was placed in the vagina: yes      Cervix cleaned and prepped: yes      Specimen collected: specimen collected and sent to pathology      Patient tolerated procedure well with no complications: yes

## 2022-07-20 NOTE — LETTER
2022     Lisbet Kelsey MD  [address]  [address] [place] [address]    Patient: Dm Rojas   YOB: 1975   Date of Visit: 2022       Dear Mary Escamillae you for referring Bulmaro Garza to me for evaluation  Below are my notes for this consultation  If you have questions, please do not hesitate to call me  I look forward to following your patient along with you  Sincerely,        Alexys Mcmullen DO        CC: No Recipients  Alexys Mcmullen DO  2022  3:08 PM  Incomplete  Assessment/Plan:         Diagnoses and all orders for this visit:    Menorrhagia with regular cycle:  Reviewed ultrasound findings with patient  We had hoped that she would be a candidate for endometrial ablation however with lower uterine segment less than 5 mm an endometrial ablation is contraindicated  We discussed other options including TXA versus hormonal treatment versus a hysterectomy  Patient has opted to try TXA  Dysmenorrhea        Subjective:      Patient ID: Dm Rojas is a 56 y o  female  HPI  G3 P 3003,  X 1; C section X2, tubal, new patient referred to office by Dr Alli Briceno, for evaluation and management of menorrhagia  Patient states that she has been experiencing heavy flows for the past 7 years  The 2nd to 4th day are very heavy with passage of clots  She also is experiencing worsening dysmenorrhea fatigue and pelvic pain  She has had 2 iron infusions recently    Last hemoglobin was 14 1    Advised to return to the office for transvaginal scan possible biopsy possible saline infusion hysterosonography;      AMB US Pelvic Non OB     Date/Time: 2022 8:34 AM  Performed by: Cathy Moura  Authorized by: Alexys Mcmullen DO   Universal Protocol:  Patient identity confirmed: verbally with patient        Procedure details:     Technique:  Transvaginal US, Non-OB    Position: lithotomy exam Uterine findings:     Length (cm): 10 23    Height (cm):  5 62    Width (cm):  6 23    Endometrial stripe: identified      Endometrium thickness (mm):  17 03  Left ovary findings:     Left ovary:  Visualized    Length (cm): 3 13    Height (cm): 1 97    Width (cm): 2 44  Right ovary findings:     Right ovary:  Visualized    Length (cm): 3 62    Height (cm): 1 65    Width (cm): 2 4  Other findings:     Free pelvic fluid: not identified      Free peritoneal fluid: not identified    Post-Procedure Details:     Impression:  Anteverted uterus is inhomogeneous throughout without fibroids  The bilateral ovaries appear within normal limits  No free fluid  The  scar appears to be 4 7mm from the endometrium  Tolerance:   Tolerated well, no immediate complications    Complications: no complications    Additional Procedure Comments:      Compared with ultrasound performed 2022     [employer]on P8 transvaginal transducer RIC5-RA with Serial Number [id number] was used during procedure and subsequently cleaned with high level disinfection utilizing the [employer]       Ultrasound performed at:   [address]  [address] [facility]  [address] [facility], [address] [facility]  Phone: [phone]  Fax:  [phone]     Sonohysterogram     Date/Time: 2022 8:43 AM  Performed by: Catarina Loving DO  Authorized by: Catarina Loving DO   Universal Protocol:  Patient identity confirmed: verbally with patient        Pre-procedure:     Prepped with: Betadine    Procedure:     Cervix cleaned and prepped: yes      Uterus sounded: yes      Catheter inserted: yes      Uterine cavity distended with saline: yes    Post-procedure:     Patient observed: yes      Post procedure instructions given to patient: yes      Patient tolerated procedure well with no complications: yes    Comments:      Sonohysterogram demonstrates  no submucosal polyps or fibroids  Endometrial biopsy     Date/Time: 2022 8:43 AM  Performed by: Estella Salguero DO  Authorized by: Estella Salguero DO   Universal Protocol:  Patient understanding: patient states understanding of the procedure being performed  Patient identity confirmed: verbally with patient        Procedure:     Procedure: endometrial biopsy with Pipelle      A bivalve speculum was placed in the vagina: yes      Cervix cleaned and prepped: yes      Specimen collected: specimen collected and sent to pathology      Patient tolerated procedure well with no complications: yes        The following portions of the patient's history were reviewed and updated as appropriate:   She  has a past medical history of Arthritis, Diabetes 1 5, managed as type 2 ([place] [address] ), Diabetes mellitus ([place] [address] ), Fibromyalgia, Hiatal hernia, HPV (human papilloma virus) infection, Irritable bowel syndrome, Lactose intolerance, and Prediabetes  She   Patient Active Problem List    Diagnosis Date Noted    Iron deficiency anemia, unspecified 2022    Dupuytren's disease of both palm and finger 2022    MAIDA (generalized anxiety disorder) 2022    Insomnia 2022    Depression, recurrent ([place] [address] ) 2022    Type 2 diabetes mellitus without complication ([place] [address] )     Pure hypercholesterolemia 2019    Chronic bilateral low back pain with bilateral sciatica 2019    Fatty liver 2018    Arthralgia of multiple sites 2018    Morbid obesity with BMI of 40 0-44 9, adult ([place] [address] ) 2018    Chronic fatigue 2018    Skin lesion 2018    Current moderate episode of major depressive disorder ([place] [address] ) 2018    Fibromyalgia 2018    Irritable bowel syndrome 2017     She  has a past surgical history that includes  section; Colonoscopy; pr esophagogastroduodenoscopy transoral diagnostic (N/A, 2017); Tonsillectomy; Tubal ligation; pr lap,cholecystectomy/graph (N/A, 2018);  Adenoidectomy; Cervical biopsy; and IR biopsy bone marrow (4/26/2022)  Her family history includes Arthritis in her mother; COPD in her paternal grandmother; Diabetes in her father; Diabetes type II in her father; Endometriosis in her mother; Esophageal cancer in her maternal grandfather; Fibromyalgia in her mother; Heart disease in her paternal grandmother; Hypertension in her father; Hyperthyroidism in her paternal grandmother; Other in her father and mother; Pancreatic cancer in her maternal uncle  She  reports that she has been smoking cigarettes  She has been smoking about 1 50 packs per day  She has never used smokeless tobacco  She reports previous alcohol use of about 1 0 standard drink of alcohol per week  She reports that she does not use drugs  Current Outpatient Medications   Medication Sig Dispense Refill    pantoprazole (PROTONIX) 40 mg tablet TAKE 1 TABLET BY MOUTH EVERY DAY 30 tablet 11    acetaminophen (TYLENOL) 325 mg tablet Take 325 mg by mouth every 6 (six) hours as needed for mild pain        buPROPion (Wellbutrin XL) 300 mg 24 hr tablet Take 1 tablet (300 mg total) by mouth every morning 30 tablet 2    Cetirizine HCl 10 MG CAPS Take by mouth daily        dicyclomine (BENTYL) 10 mg capsule TAKE 1 CAPSULE BY MOUTH 3 TIMES DAILY  90 capsule 8    DULoxetine (CYMBALTA) 60 mg delayed release capsule Take 2 capsules (120 mg total) by mouth daily 60 capsule 5    gabapentin (NEURONTIN) 300 mg capsule TAKE 1 CAPSULE 3 TIMES A DAY FOR 7 DAYS, THEN 2 CAPSULES 3 TIMES A DAY FOR 23 DAYS  159 capsule 0    glyBURIDE (DIABETA) 2 5 mg tablet TAKE 1 TABLET BY MOUTH DAILY WITH BREAKFAST   30 tablet 1    magnesium oxide (MAG-OX) 400 mg Take 1 tablet (400 mg total) by mouth daily 30 tablet 0    montelukast (SINGULAIR) 10 mg tablet TAKE 1 TABLET BY MOUTH EVERYDAY AT BEDTIME 30 tablet 3    olopatadine HCl (PATADAY) 0 2 % opth drops Apply 0 2 % to eye as needed        ondansetron (Zofran ODT) 4 mg disintegrating tablet Take 1 tablet (4 mg total) by mouth every 6 (six) hours as needed for nausea or vomiting 20 tablet 0    ramelteon (ROZEREM) 8 mg tablet Take 1 tablet (8 mg total) by mouth daily at bedtime 30 tablet 2     No current facility-administered medications for this visit  Current Outpatient Medications on File Prior to Visit   Medication Sig    pantoprazole (PROTONIX) 40 mg tablet TAKE 1 TABLET BY MOUTH EVERY DAY    acetaminophen (TYLENOL) 325 mg tablet Take 325 mg by mouth every 6 (six) hours as needed for mild pain      buPROPion (Wellbutrin XL) 300 mg 24 hr tablet Take 1 tablet (300 mg total) by mouth every morning    Cetirizine HCl 10 MG CAPS Take by mouth daily      dicyclomine (BENTYL) 10 mg capsule TAKE 1 CAPSULE BY MOUTH 3 TIMES DAILY   DULoxetine (CYMBALTA) 60 mg delayed release capsule Take 2 capsules (120 mg total) by mouth daily    gabapentin (NEURONTIN) 300 mg capsule TAKE 1 CAPSULE 3 TIMES A DAY FOR 7 DAYS, THEN 2 CAPSULES 3 TIMES A DAY FOR 23 DAYS   glyBURIDE (DIABETA) 2 5 mg tablet TAKE 1 TABLET BY MOUTH DAILY WITH BREAKFAST   magnesium oxide (MAG-OX) 400 mg Take 1 tablet (400 mg total) by mouth daily    montelukast (SINGULAIR) 10 mg tablet TAKE 1 TABLET BY MOUTH EVERYDAY AT BEDTIME    olopatadine HCl (PATADAY) 0 2 % opth drops Apply 0 2 % to eye as needed      ondansetron (Zofran ODT) 4 mg disintegrating tablet Take 1 tablet (4 mg total) by mouth every 6 (six) hours as needed for nausea or vomiting    ramelteon (ROZEREM) 8 mg tablet Take 1 tablet (8 mg total) by mouth daily at bedtime     No current facility-administered medications on file prior to visit  She is allergic to nuts - food allergy, other, desogestrel-ethinyl estradiol, pineapple - food allergy, dog epithelium allergy skin test, dust mite extract, and ortho tri-cyclen (28) [norgestimate-eth estradiol]       Review of Systems      Objective: There were no vitals taken for this visit           Physical Exam

## 2022-07-21 ENCOUNTER — SOCIAL WORK (OUTPATIENT)
Dept: BEHAVIORAL/MENTAL HEALTH CLINIC | Facility: CLINIC | Age: 47
End: 2022-07-21
Payer: COMMERCIAL

## 2022-07-21 DIAGNOSIS — F41.1 GAD (GENERALIZED ANXIETY DISORDER): ICD-10-CM

## 2022-07-21 DIAGNOSIS — F33.1 MODERATE EPISODE OF RECURRENT MAJOR DEPRESSIVE DISORDER (HCC): Primary | ICD-10-CM

## 2022-07-21 DIAGNOSIS — F33.9 DEPRESSION, RECURRENT (HCC): ICD-10-CM

## 2022-07-21 PROCEDURE — 90834 PSYTX W PT 45 MINUTES: CPT | Performed by: SOCIAL WORKER

## 2022-07-21 NOTE — PSYCH
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  She had a saline ultra sound and a biopsy and her test results will be in hopefully by the end of the week  She has been for iron infusions and has blood work and sees the hematologist in September  She feels as though she is playing a waiting game  She still has not seen her "boyfriend" since the end of April  Every time they have plans he cancels  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  Her son wants to get his GED and get on with his life  He is social with his girlfriend and he does have friends  She did get a puppy and she thinks she is allergic to it  With her  having to put their children on her insurance and his giving her a hard time, so she was encouraged to check into [facility]  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  She is appropriately dressed and well groomed and overweight  Her mood is anxious and her affect is congruent  Her thought process is logical and speech is normal rate and normal volume  She is very aware of what she needs in a relationship and she is able to communicate it  She is lonely and even though she can be a strong woman she is lonely  She thought she had a good marriage and did not  Alma Lennox appears to be making some progress as evidenced by her being able to talk about her boyfriend and her health without crying  Her area of concern is her loneliness which is making her possibly settle  Alma Lennox will make a pros and cons list of her current relationship and follow up with this writer in 3 weeks      Behavioral Health Treatment Plan [facility]: Diagnosis and Treatment Plan explained to Ric Peña relates understanding diagnosis and is agreeable to Treatment Plan   Yes

## 2022-07-22 DIAGNOSIS — E11.9 TYPE 2 DIABETES MELLITUS WITHOUT COMPLICATION, WITHOUT LONG-TERM CURRENT USE OF INSULIN (HCC): ICD-10-CM

## 2022-07-22 RX ORDER — GLYBURIDE 2.5 MG/1
TABLET ORAL
Qty: 30 TABLET | Refills: 1 | Status: SHIPPED | OUTPATIENT
Start: 2022-07-22 | End: 2022-09-24

## 2022-08-04 ENCOUNTER — OFFICE VISIT (OUTPATIENT)
Dept: PSYCHIATRY | Facility: CLINIC | Age: 47
End: 2022-08-04
Payer: COMMERCIAL

## 2022-08-04 DIAGNOSIS — F33.1 MDD (MAJOR DEPRESSIVE DISORDER), RECURRENT EPISODE, MODERATE (HCC): Primary | ICD-10-CM

## 2022-08-04 DIAGNOSIS — F41.1 GAD (GENERALIZED ANXIETY DISORDER): ICD-10-CM

## 2022-08-04 DIAGNOSIS — F51.01 PRIMARY INSOMNIA: ICD-10-CM

## 2022-08-04 PROCEDURE — 99214 OFFICE O/P EST MOD 30 MIN: CPT | Performed by: PHYSICIAN ASSISTANT

## 2022-08-04 NOTE — PSYCH
PROGRESS NOTE        [facility]      Name and Date of Birth:  Mariya Perez 56 y o  1975    Date of Visit: 08/04/22    SUBJECTIVE:  Suzette Cao was seen for follow-up of major depression, generalized anxiety and for medication management  She denies suicidal ideation, intent or plan at present, has no suicidal ideation, intent or plan at present  She denies any auditory hallucinations and visual hallucinations, denies any other delusional thinking, denies any delusional thinking  She denies any side effects from medications    HPI ROS Appetite Changes and Sleep: normal appetite, normal sleep    Review Of Systems:      Constitutional Negative   ENT Negative   Cardiovascular Negative   Respiratory Negative   Gastrointestinal Negative   Genitourinary Negative   Musculoskeletal Negative   Integumentary Negative   Neurological Negative   Endocrine Negative   Other Symptoms Negative and None       Laboratory Results: No results found for this or any previous visit      Substance Abuse History:    Social History     Substance and Sexual Activity   Drug Use No       Family Psychiatric History:     Family History   Problem Relation Age of Onset    Arthritis Mother     Fibromyalgia Mother     Endometriosis Mother     Other Mother         Eye pressure, gallbladder removal    Hypertension Father     Diabetes Father     Diabetes type II Father     Other Father         Gallbladder removal     Pancreatic cancer Maternal Uncle     Esophageal cancer Maternal Grandfather     Heart disease Paternal Grandmother     Hyperthyroidism Paternal Grandmother     COPD Paternal Grandmother     Stroke Neg Hx     Thyroid cancer Neg Hx        The following portions of the patient's history were reviewed and updated as appropriate: past family history, past medical history, past social history, past surgical history and problem list     Social History Socioeconomic History    Marital status: Legally      Spouse name: Not on file    Number of children: Not on file    Years of education: Not on file    Highest education level: Not on file   Occupational History    Not on file   Tobacco Use    Smoking status: Current Every Day Smoker     Packs/day: 1 50     Types: Cigarettes    Smokeless tobacco: Never Used   Vaping Use    Vaping Use: Never used   Substance and Sexual Activity    Alcohol use: Not Currently     Alcohol/week: 1 0 standard drink     Types: 1 Glasses of wine per week     Comment: does not currently drink ETOH    Drug use: No    Sexual activity: Not Currently     Partners: Male     Birth control/protection: Female Sterilization     Comment: has not been sexually active since December   Other Topics Concern    Not on file   Social History Narrative    Not on file     Social Determinants of Health     Financial Resource Strain: Not on file   Food Insecurity: Not on file   Transportation Needs: Not on file   Physical Activity: Not on file   Stress: Not on file   Social Connections: Not on file   Intimate Partner Violence: Not on file   Housing Stability: Not on file     Social History     Social History Narrative    Not on file        Social History     Tobacco History     Smoking Status  Current Every Day Smoker Smoking Frequency  1 5 packs/day Smoking Tobacco Type  Cigarettes    Smokeless Tobacco Use  Never Used          Alcohol History     Alcohol Use Status  Not Currently Drinks/Week  1 Glasses of wine per week Amount  1 0 standard drink of alcohol/wk Comment  does not currently drink ETOH          Drug Use     Drug Use Status  No          Sexual Activity     Sexually Active  Not Currently Partners  Male Birth Control/Protection  Female Sterilization Comment  has not been sexually active since December          Activities of Daily Living    Not Asked                     OBJECTIVE:     Mental Status Evaluation:    Appearance age appropriate, casually dressed   Behavior pleasant, cooperative   Speech normal volume, normal pitch   Mood    Affect    Thought Processes logical   Associations intact associations   Thought Content normal   Perceptual Disturbances: none   Abnormal Thoughts  Risk Potential Suicidal ideation - None  Homicidal ideation - None  Potential for aggression - No   Orientation oriented to person, place, time/date and situation   Memory recent and remote memory grossly intact   Cosciousness alert and awake   Attention Span attention span and concentration are age appropriate   Intellect Appears to be of Average Intelligence   Insight age appropriate    Judgement good    Muscle Strength and  Gait muscle strength and tone were normal   Language no difficulty naming common objects   Fund of Knowledge displays adequate knowledge of current events   Pain none   Pain Scale 0       Assessment/Plan:       There are no diagnoses linked to this encounter  Treatment Recommendations/Precautions:  Cymbalta 120 mg daily prescribed by PCP for fibromyalgia     Wellbutrin  mg q a m  Rozerem 8 mg at bedtime    Continue current medications:    Risks/Benefits      Risks, Benefits And Possible Side Effects Of Medications:    Risks, benefits, and possible side effects of medications explained to patient and patient verbalizes understanding and agreement for treatment      Controlled Medication Discussion:     Not applicable    Psychotherapy Provided:     Individual psychotherapy provided: No

## 2022-08-04 NOTE — PSYCH
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 follow-up of major depression, anxiety and for medication management  Continues with multiple external stressors that have been causing her increased anxiety and depression  States that she had an episode last week where she cried for 12 hours  She then stated that she forgot to take her Cymbalta night before though  Overall notes that she has been noting an improvement with Wellbutrin  States that she is more motivated and accomplishing more things  Depression is slightly improving  States that she is getting things done and has been sleeping much better  Typically getting about 6 hours of sleep or more which is a big improvement  Has not been eating resume every night  Also states that she has been working more on her diet and nutrition  She has been quite busy with her children  One of her son's is involved with soccer camps  uncertain with finances, health insurance will be ending at end of month  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 which has been difficult  Frustrations and stressors related to her ex and health insurance for her and children will be ending at the end of this month due to her no longer working  States that her ex has been giving her hard time" regarding the insurance coverage   she continues to see Norleen Moritz on a regular basis for therapy  She denies suicidal ideation, intent or plan at present, has no suicidal ideation, intent or plan at present  She denies any auditory hallucinations and visual hallucinations, denies any other delusional thinking, denies any delusional thinking  She denies any side effects from medications      HPI ROS Appetite Changes and Sleep: normal appetite, normal sleep    Review Of Systems:      Constitutional Negative   ENT Negative   Cardiovascular Negative   Respiratory Negative   Gastrointestinal Negative   Genitourinary Negative   Musculoskeletal Negative   Integumentary Negative   Neurological Negative   Endocrine Negative   Other Symptoms Negative and None       Laboratory Results: No results found for this or any previous visit      Substance Abuse History:    Social History     Substance and Sexual Activity   Drug Use No       Family Psychiatric History:     Family History   Problem Relation Age of Onset    Arthritis Mother     Fibromyalgia Mother     Endometriosis Mother     Other Mother         Eye pressure, gallbladder removal    Hypertension Father     Diabetes Father     Diabetes type II Father     Other Father         Gallbladder removal     Pancreatic cancer Maternal Uncle     Esophageal cancer Maternal Grandfather     Heart disease Paternal Grandmother     Hyperthyroidism Paternal Grandmother     COPD Paternal Grandmother     Stroke Neg Hx     Thyroid cancer Neg Hx        The following portions of the patient's history were reviewed and updated as appropriate: past family history, past medical history, past social history, past surgical history and problem list     Social History     Socioeconomic History    Marital status: Legally      Spouse name: Not on file    Number of children: Not on file    Years of education: Not on file    Highest education level: Not on file   Occupational History    Not on file   Tobacco Use    Smoking status: Current Every Day Smoker     Packs/day: 1 50     Types: Cigarettes    Smokeless tobacco: Never Used   Vaping Use    Vaping Use: Never used   Substance and Sexual Activity    Alcohol use: Not Currently     Alcohol/week: 1 0 standard drink     Types: 1 Glasses of wine per week     Comment: does not currently drink ETOH    Drug use: No    Sexual activity: Not Currently     Partners: Male     Birth control/protection: Female Sterilization     Comment: has not been sexually active since December   Other Topics Concern    Not on file   Social History Narrative    Not on file     Social Determinants of Health     Financial Resource Strain: Not on file   Food Insecurity: Not on file   Transportation Needs: Not on file   Physical Activity: Not on file   Stress: Not on file   Social Connections: Not on file   Intimate Partner Violence: Not on file   Housing Stability: Not on file     Social History     Social History Narrative    Not on file        Social History     Tobacco History     Smoking Status  Current Every Day Smoker Smoking Frequency  1 5 packs/day Smoking Tobacco Type  Cigarettes    Smokeless Tobacco Use  Never Used          Alcohol History     Alcohol Use Status  Not Currently Drinks/Week  1 Glasses of wine per week Amount  1 0 standard drink of alcohol/wk Comment  does not currently drink ETOH          Drug Use     Drug Use Status  No          Sexual Activity     Sexually Active  Not Currently Partners  Male Birth Control/Protection  Female Sterilization Comment  has not been sexually active since December          Activities of Daily Living    Not Asked                     OBJECTIVE:     Mental Status Evaluation:    Appearance age appropriate, casually dressed   Behavior pleasant, cooperative   Speech normal volume, normal pitch   Mood Anxious, depressed   Affect Congruent, tearful   Thought Processes logical   Associations intact associations   Thought Content normal   Perceptual Disturbances: none   Abnormal Thoughts  Risk Potential Suicidal ideation - None  Homicidal ideation - None  Potential for aggression - No   Orientation oriented to person, place, time/date and situation   Memory recent and remote memory grossly intact   Cosciousness alert and awake   Attention Span attention span and concentration are age appropriate   Intellect Appears to be of Average Intelligence   Insight age appropriate    Judgement good    Muscle Strength and  Gait muscle strength and tone were normal   Language no difficulty naming common objects   Fund of Knowledge displays adequate knowledge of current events   Pain none   Pain Scale 0       Assessment/Plan:       Diagnoses and all orders for this visit:    MDD (major depressive disorder), recurrent episode, moderate (HCC)    MAIDA (generalized anxiety disorder)    Primary insomnia          Treatment Recommendations/Precautions: Wellbutrin  mg qam  Cymbalta 120 mg qd  Rozerem 8 mg po qhs prn    Continue current medications and follow-up in three months   She will call sooner if any questions or concerns    Risks/Benefits      Risks, Benefits And Possible Side Effects Of Medications:    Risks, benefits, and possible side effects of medications explained to patient and patient verbalizes understanding and agreement for treatment      Controlled Medication Discussion:     Not applicable    Psychotherapy Provided:     Individual psychotherapy provided: No

## 2022-08-11 ENCOUNTER — SOCIAL WORK (OUTPATIENT)
Dept: BEHAVIORAL/MENTAL HEALTH CLINIC | Facility: CLINIC | Age: 47
End: 2022-08-11
Payer: COMMERCIAL

## 2022-08-11 DIAGNOSIS — F33.9 DEPRESSION, RECURRENT (HCC): ICD-10-CM

## 2022-08-11 DIAGNOSIS — F33.1 MODERATE EPISODE OF RECURRENT MAJOR DEPRESSIVE DISORDER (HCC): Primary | ICD-10-CM

## 2022-08-11 DIAGNOSIS — F41.1 GAD (GENERALIZED ANXIETY DISORDER): ICD-10-CM

## 2022-08-11 PROCEDURE — 90834 PSYTX W PT 45 MINUTES: CPT | Performed by: SOCIAL WORKER

## 2022-08-11 NOTE — PSYCH
Start Time 12:00PM-12:45PM  Psychotherapy Provided: Individual Psychotherapy 45 minutes   Length of time in session: 45 minutes, follow up in 3-4 week    No diagnosis found  Goals addressed in session: Goal 1   Pain:  None    none  0  Current suicide risk : Low   D: Nafisa Toney states that she is faced with either having to totally quit smoking so she can have the hormone therapy  She is not wanting to have a hysterotomy  She had seen the psychiatrist who feels like she may have PMDD  She feels like her mom has been super judgemental and we discussed her melt down that she reports at length  She feels like she does not have her unconditional help  She did miss her medications for 24 hours which did not help her mood and neither did getting her menses  She still has not seen her boyfriend since April  This is also a stressor for her  He I snot  yet and she is not sure if he took a vacation with his wife and children  She was asked when she was going to get out of the relationship  She states her ex has not done anything about their insurance  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 he will complete 180 days  Her son was in [employer] and did well, and then with Covid he did not do well  When he went back he slept through school  Her ex is not thrilled with this whole prospect  She thought about going back to work for [employer] but with her faiza she cannot make that type of a commitment  She is appropriately dressed and well groomed, being overweight  Her thought process is logical and speech is normal rate and normal volume  She is upset with the politics in school and she cannot be her authentic self in the classroom  We discussed a lot of her options and how her health plays into her options    She feels with her relationship if his divorce is not finalized and he is not making steps to move then she is ending this  She feels she is very open with her feelings and what she needs  Tunde Matthew appears to be making some progress as evidenced by her not waiting for ever for this man and relationship  She has put time frames in  Her mentla health remains an area where support is needed as well as her emotional health,    Tunde Matthew will set some deadlines with her "boyfriend" and follow up with this writer next month  Behavioral Health Treatment Plan [facility]: Diagnosis and Treatment Plan explained to Gunner Gonzalez relates understanding diagnosis and is agreeable to Treatment Plan   Yes

## 2022-08-13 DIAGNOSIS — T78.40XA ALLERGY, INITIAL ENCOUNTER: ICD-10-CM

## 2022-08-13 RX ORDER — MONTELUKAST SODIUM 10 MG/1
TABLET ORAL
Qty: 30 TABLET | Refills: 3 | Status: SHIPPED | OUTPATIENT
Start: 2022-08-13

## 2022-09-01 ENCOUNTER — TELEPHONE (OUTPATIENT)
Dept: HEMATOLOGY ONCOLOGY | Facility: CLINIC | Age: 47
End: 2022-09-01

## 2022-09-08 ENCOUNTER — RA CDI HCC (OUTPATIENT)
Dept: OTHER | Facility: HOSPITAL | Age: 47
End: 2022-09-08

## 2022-09-08 NOTE — PROGRESS NOTES
[facility]  coding opportunities          Chart Reviewed number of suggestions sent to Provider: 1   [id number]    Patients Insurance        Commercial Insurance: [employer]

## 2022-09-14 ENCOUNTER — OFFICE VISIT (OUTPATIENT)
Dept: INTERNAL MEDICINE CLINIC | Facility: CLINIC | Age: 47
End: 2022-09-14
Payer: COMMERCIAL

## 2022-09-14 VITALS
SYSTOLIC BLOOD PRESSURE: 122 MMHG | TEMPERATURE: 97.5 F | OXYGEN SATURATION: 94 % | RESPIRATION RATE: 18 BRPM | HEIGHT: 65 IN | BODY MASS INDEX: 40.72 KG/M2 | WEIGHT: 244.4 LBS | HEART RATE: 94 BPM | DIASTOLIC BLOOD PRESSURE: 76 MMHG

## 2022-09-14 DIAGNOSIS — F17.200 TOBACCO USE DISORDER: ICD-10-CM

## 2022-09-14 DIAGNOSIS — D50.9 IRON DEFICIENCY ANEMIA, UNSPECIFIED IRON DEFICIENCY ANEMIA TYPE: ICD-10-CM

## 2022-09-14 DIAGNOSIS — E11.9 TYPE 2 DIABETES MELLITUS WITHOUT COMPLICATION, WITHOUT LONG-TERM CURRENT USE OF INSULIN (HCC): Primary | ICD-10-CM

## 2022-09-14 DIAGNOSIS — K29.60 EROSIVE GASTRITIS: ICD-10-CM

## 2022-09-14 PROCEDURE — 2025F 7 FLD RTA PHOTO W/O RTNOPTHY: CPT | Performed by: FAMILY MEDICINE

## 2022-09-14 PROCEDURE — 3725F SCREEN DEPRESSION PERFORMED: CPT | Performed by: FAMILY MEDICINE

## 2022-09-14 PROCEDURE — 99214 OFFICE O/P EST MOD 30 MIN: CPT | Performed by: FAMILY MEDICINE

## 2022-09-14 PROCEDURE — 92250 FUNDUS PHOTOGRAPHY W/I&R: CPT | Performed by: FAMILY MEDICINE

## 2022-09-14 RX ORDER — SODIUM FLUORIDE 6 MG/ML
PASTE, DENTIFRICE DENTAL
COMMUNITY
Start: 2022-08-16

## 2022-09-14 NOTE — PROGRESS NOTES
FOLLOW-UP OFFICE VISIT  [facility]    NAME: Hallie De Los Santos  AGE: 56 y o  SEX: female  : 1975     DATE: 2022     Assessment and Plan:     Problem List Items Addressed This Visit        Endocrine    Type 2 diabetes mellitus without complication ([facility] [address] ) - Primary    Relevant Orders    IRIS Diabetic eye exam       Other    Iron deficiency anemia, unspecified      Other Visit Diagnoses     Tobacco use disorder        Erosive gastritis          Patient on 40 mg of Protonix daily and still has nighttime reflux symptoms  Encourage PP shin to use Tums nightly and Protonix daily  Encouraged patient to follow-up with GI for possible endoscopy  Deficiency anemia status post iron infusions  Patient to follow with Hematology-Oncology in a few weeks  Patient to continue Wellbutrin up with smoking cessation  Patient continue Cymbalta and behavioral health therapy to help with mood disorder  Diabetes well controlled with an A1c of 6 7  Patient to continue glyburide therapy  Return in about 3 months (around 2022) for Annual physical      Chief Complaint:     Chief Complaint   Patient presents with    Follow-up     Pt states that she is here for a f/u        History of Present Illness:     Still fatigued  S/p iron infuction  Didn't help  Doesn't know what her iron level is at this time  If going to f/u with hematology/onc at end of month  S/p gyn eval  Would like to to hormone therapy for her menorrhagia, but has to quit smoking  Taking wellbutrin  Has noticed a decrease in use since starting  Has left teaching  Is applying for disability  Mammogram scheduled for    continues to follow with behavioral health      Review of Systems:     Review of Systems   Constitutional: Negative for fever  HENT: Positive for congestion  Respiratory: Negative for shortness of breath      Cardiovascular: Negative for chest pain and leg swelling  Genitourinary: Positive for menstrual problem  Psychiatric/Behavioral: Negative for sleep disturbance  Problem List:     Patient Active Problem List   Diagnosis    Current moderate episode of major depressive disorder (HCC)    Irritable bowel syndrome    Fibromyalgia    Morbid obesity with BMI of 40 0-44 9, adult (HCC)    Chronic fatigue    Skin lesion    Fatty liver    Arthralgia of multiple sites    Chronic bilateral low back pain with bilateral sciatica    Type 2 diabetes mellitus without complication (HCC)    Pure hypercholesterolemia    Depression, recurrent (HCC)    MAIDA (generalized anxiety disorder)    Insomnia    Dupuytren's disease of both palm and finger    Iron deficiency anemia, unspecified        Objective:     /76 (BP Location: Left arm, Patient Position: Sitting, Cuff Size: Large)   Pulse 94   Temp 97 5 °F (36 4 °C) (Temporal)   Resp 18   Ht 5' 5" (1 651 m)   Wt 111 kg (244 lb 6 4 oz)   SpO2 94%   BMI 40 67 kg/m²     Physical Exam  HENT:      Head: Normocephalic and atraumatic  Eyes:      Conjunctiva/sclera: Conjunctivae normal       Pupils: Pupils are equal, round, and reactive to light  Cardiovascular:      Rate and Rhythm: Normal rate and regular rhythm  Heart sounds: No murmur heard  Pulmonary:      Effort: Pulmonary effort is normal       Breath sounds: Normal breath sounds  Abdominal:      General: Bowel sounds are normal       Palpations: Abdomen is soft  Neurological:      Mental Status: She is alert and oriented to person, place, and time  Psychiatric:         Attention and Perception: Attention normal          Mood and Affect: Affect normal  Mood is depressed  Thought Content:  Thought content normal              Joselito [facility]AMB [facility]  9/14/2022 1:56 PM

## 2022-09-14 NOTE — PATIENT INSTRUCTIONS
Two tums in the evening  Protonix in the morning  Elevated the head  Start taking probiotics and increasing fermented food intake

## 2022-09-18 DIAGNOSIS — F41.1 GAD (GENERALIZED ANXIETY DISORDER): ICD-10-CM

## 2022-09-18 DIAGNOSIS — F33.1 MDD (MAJOR DEPRESSIVE DISORDER), RECURRENT EPISODE, MODERATE (HCC): ICD-10-CM

## 2022-09-19 RX ORDER — BUPROPION HYDROCHLORIDE 300 MG/1
300 TABLET ORAL EVERY MORNING
Qty: 30 TABLET | Refills: 2 | Status: SHIPPED | OUTPATIENT
Start: 2022-09-19

## 2022-09-23 ENCOUNTER — TELEPHONE (OUTPATIENT)
Dept: HEMATOLOGY ONCOLOGY | Facility: CLINIC | Age: 47
End: 2022-09-23

## 2022-09-24 DIAGNOSIS — E11.9 TYPE 2 DIABETES MELLITUS WITHOUT COMPLICATION, WITHOUT LONG-TERM CURRENT USE OF INSULIN (HCC): ICD-10-CM

## 2022-09-24 RX ORDER — GLYBURIDE 2.5 MG/1
TABLET ORAL
Qty: 30 TABLET | Refills: 1 | Status: SHIPPED | OUTPATIENT
Start: 2022-09-24

## 2022-09-26 ENCOUNTER — TELEPHONE (OUTPATIENT)
Dept: HEMATOLOGY ONCOLOGY | Facility: CLINIC | Age: 47
End: 2022-09-26

## 2022-09-26 NOTE — TELEPHONE ENCOUNTER
lvm x3 for pt to have labs drawn with [facility] number to reschedule if unable to get labs done prior to appt

## 2022-09-27 ENCOUNTER — TELEPHONE (OUTPATIENT)
Dept: HEMATOLOGY ONCOLOGY | Facility: CLINIC | Age: 47
End: 2022-09-27

## 2022-09-27 NOTE — TELEPHONE ENCOUNTER
09/27/22    Spoke with pt reminding labs  Pt stated she doesn't have insurance currently and is waiting for her new card to come in  It will be in the middle of Oct     Pt still c/o chronic fatigue and doesn't feel getting much better s/p IV iron  She denied any other concerning sx, e g CP, SOB or palpitation  Pt was advised to have labs done 1 wk before her appt  Labs needed:  CBC;  Iron study

## 2022-10-06 ENCOUNTER — TELEPHONE (OUTPATIENT)
Dept: HEMATOLOGY ONCOLOGY | Facility: CLINIC | Age: 47
End: 2022-10-06

## 2022-10-06 NOTE — TELEPHONE ENCOUNTER
Patients are being rescheduled due to recent changes to the provider rounding schedules  LVM advising patient that her appointment on 10/25/2022 has been rescheduled to Monday 10/24/2022 @ 11:30 AM with labs 1 week prior  Instructed patient to return the call to [phone] to confirm receipt of message

## 2022-10-18 ENCOUNTER — VBI (OUTPATIENT)
Dept: ADMINISTRATIVE | Facility: OTHER | Age: 47
End: 2022-10-18

## 2022-10-21 NOTE — TELEPHONE ENCOUNTER
Called to follow up previous message, no answer  Mailbox full, unable to leave a message  Will send Mychart message

## 2022-10-25 ENCOUNTER — TELEPHONE (OUTPATIENT)
Dept: SURGICAL ONCOLOGY | Facility: CLINIC | Age: 47
End: 2022-10-25

## 2022-10-28 ENCOUNTER — TELEPHONE (OUTPATIENT)
Dept: GASTROENTEROLOGY | Facility: CLINIC | Age: 47
End: 2022-10-28

## 2022-11-07 ENCOUNTER — TELEPHONE (OUTPATIENT)
Dept: SURGICAL ONCOLOGY | Facility: CLINIC | Age: 47
End: 2022-11-07

## 2022-11-07 NOTE — TELEPHONE ENCOUNTER
Called patient to reschedule follow up appt that was missed  Patient is scheduled for 12/5 for follow up with Ryan Santiago

## 2022-11-11 ENCOUNTER — APPOINTMENT (OUTPATIENT)
Dept: LAB | Facility: CLINIC | Age: 47
End: 2022-11-11

## 2022-11-11 ENCOUNTER — OFFICE VISIT (OUTPATIENT)
Dept: PSYCHIATRY | Facility: CLINIC | Age: 47
End: 2022-11-11

## 2022-11-11 DIAGNOSIS — F41.1 GAD (GENERALIZED ANXIETY DISORDER): ICD-10-CM

## 2022-11-11 DIAGNOSIS — F33.1 MDD (MAJOR DEPRESSIVE DISORDER), RECURRENT EPISODE, MODERATE (HCC): ICD-10-CM

## 2022-11-11 DIAGNOSIS — D75.839 THROMBOCYTOSIS: ICD-10-CM

## 2022-11-11 DIAGNOSIS — F51.01 PRIMARY INSOMNIA: ICD-10-CM

## 2022-11-11 DIAGNOSIS — D50.9 IRON DEFICIENCY ANEMIA, UNSPECIFIED IRON DEFICIENCY ANEMIA TYPE: ICD-10-CM

## 2022-11-11 DIAGNOSIS — D72.829 LEUKOCYTOSIS, UNSPECIFIED TYPE: ICD-10-CM

## 2022-11-11 LAB
BASOPHILS # BLD AUTO: 0.06 THOUSANDS/ÂΜL (ref 0–0.1)
BASOPHILS NFR BLD AUTO: 1 % (ref 0–1)
EOSINOPHIL # BLD AUTO: 0.21 THOUSAND/ÂΜL (ref 0–0.61)
EOSINOPHIL NFR BLD AUTO: 2 % (ref 0–6)
ERYTHROCYTE [DISTWIDTH] IN BLOOD BY AUTOMATED COUNT: 13.5 % (ref 11.6–15.1)
FERRITIN SERPL-MCNC: 91 NG/ML (ref 8–388)
HCT VFR BLD AUTO: 44 % (ref 34.8–46.1)
HGB BLD-MCNC: 14.2 G/DL (ref 11.5–15.4)
IMM GRANULOCYTES # BLD AUTO: 0.13 THOUSAND/UL (ref 0–0.2)
IMM GRANULOCYTES NFR BLD AUTO: 1 % (ref 0–2)
IRON SATN MFR SERPL: 17 % (ref 15–50)
IRON SERPL-MCNC: 53 UG/DL (ref 50–170)
LYMPHOCYTES # BLD AUTO: 2.49 THOUSANDS/ÂΜL (ref 0.6–4.47)
LYMPHOCYTES NFR BLD AUTO: 19 % (ref 14–44)
MCH RBC QN AUTO: 29.5 PG (ref 26.8–34.3)
MCHC RBC AUTO-ENTMCNC: 32.3 G/DL (ref 31.4–37.4)
MCV RBC AUTO: 91 FL (ref 82–98)
MONOCYTES # BLD AUTO: 0.73 THOUSAND/ÂΜL (ref 0.17–1.22)
MONOCYTES NFR BLD AUTO: 6 % (ref 4–12)
NEUTROPHILS # BLD AUTO: 9.32 THOUSANDS/ÂΜL (ref 1.85–7.62)
NEUTS SEG NFR BLD AUTO: 71 % (ref 43–75)
NRBC BLD AUTO-RTO: 0 /100 WBCS
PLATELET # BLD AUTO: 347 THOUSANDS/UL (ref 149–390)
PMV BLD AUTO: 9.7 FL (ref 8.9–12.7)
RBC # BLD AUTO: 4.82 MILLION/UL (ref 3.81–5.12)
TIBC SERPL-MCNC: 321 UG/DL (ref 250–450)
WBC # BLD AUTO: 12.94 THOUSAND/UL (ref 4.31–10.16)

## 2022-11-11 RX ORDER — BUPROPION HYDROCHLORIDE 300 MG/1
300 TABLET ORAL EVERY MORNING
Qty: 30 TABLET | Refills: 3 | Status: SHIPPED | OUTPATIENT
Start: 2022-11-11

## 2022-11-11 RX ORDER — RAMELTEON 8 MG/1
8 TABLET ORAL
Qty: 30 TABLET | Refills: 3 | Status: SHIPPED | OUTPATIENT
Start: 2022-11-11
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
## 2022-11-28 DIAGNOSIS — E11.9 TYPE 2 DIABETES MELLITUS WITHOUT COMPLICATION, WITHOUT LONG-TERM CURRENT USE OF INSULIN (HCC): ICD-10-CM

## 2022-11-28 RX ORDER — GLYBURIDE 2.5 MG/1
TABLET ORAL
Qty: 30 TABLET | Refills: 1 | Status: SHIPPED | OUTPATIENT
Start: 2022-11-28

## 2022-12-02 ENCOUNTER — TELEPHONE (OUTPATIENT)
Dept: HEMATOLOGY ONCOLOGY | Facility: CLINIC | Age: 47
End: 2022-12-02

## 2022-12-02 DIAGNOSIS — R53.82 CHRONIC FATIGUE: ICD-10-CM

## 2022-12-02 DIAGNOSIS — D75.839 THROMBOCYTOSIS: ICD-10-CM

## 2022-12-02 DIAGNOSIS — D50.9 IRON DEFICIENCY ANEMIA, UNSPECIFIED IRON DEFICIENCY ANEMIA TYPE: Primary | ICD-10-CM

## 2022-12-02 DIAGNOSIS — N92.0 MENORRHAGIA WITH REGULAR CYCLE: ICD-10-CM

## 2022-12-02 DIAGNOSIS — D72.829 LEUKOCYTOSIS, UNSPECIFIED TYPE: ICD-10-CM

## 2022-12-02 DIAGNOSIS — R71.8 ABNORMALITY OF RED BLOOD CELLS: ICD-10-CM

## 2022-12-02 DIAGNOSIS — R79.0 LOW FERRITIN LEVEL: ICD-10-CM

## 2022-12-02 DIAGNOSIS — R16.1 SPLENOMEGALY: ICD-10-CM

## 2022-12-02 NOTE — TELEPHONE ENCOUNTER
LVM re: updated labs that need to be completed prior to office visit on 12/5/2022  Iron Panel and CBC re-ordered  [facility] number provided for possible call back

## 2022-12-05 ENCOUNTER — TELEPHONE (OUTPATIENT)
Dept: HEMATOLOGY ONCOLOGY | Facility: CLINIC | Age: 47
End: 2022-12-05

## 2022-12-05 NOTE — TELEPHONE ENCOUNTER
Advised patient that due to missing labs her follow up appointment has been rescheduled to Friday 12/9/2022 @ 1:30 PM     Advised patient that she must complete her labs at a [place] [id number] lab prior to this appointment date  Patient verbalized understanding and agreement

## 2022-12-07 ENCOUNTER — APPOINTMENT (OUTPATIENT)
Dept: LAB | Facility: CLINIC | Age: 47
End: 2022-12-07

## 2022-12-07 ENCOUNTER — RA CDI HCC (OUTPATIENT)
Dept: OTHER | Facility: HOSPITAL | Age: 47
End: 2022-12-07

## 2022-12-07 DIAGNOSIS — R53.82 CHRONIC FATIGUE: ICD-10-CM

## 2022-12-07 DIAGNOSIS — R16.1 SPLENOMEGALY: ICD-10-CM

## 2022-12-07 DIAGNOSIS — D75.839 THROMBOCYTOSIS: ICD-10-CM

## 2022-12-07 DIAGNOSIS — R79.0 LOW FERRITIN LEVEL: ICD-10-CM

## 2022-12-07 DIAGNOSIS — D72.829 LEUKOCYTOSIS, UNSPECIFIED TYPE: ICD-10-CM

## 2022-12-07 DIAGNOSIS — N92.0 MENORRHAGIA WITH REGULAR CYCLE: ICD-10-CM

## 2022-12-07 DIAGNOSIS — R71.8 ABNORMALITY OF RED BLOOD CELLS: ICD-10-CM

## 2022-12-07 DIAGNOSIS — D50.9 IRON DEFICIENCY ANEMIA, UNSPECIFIED IRON DEFICIENCY ANEMIA TYPE: ICD-10-CM

## 2022-12-07 LAB
BASOPHILS # BLD AUTO: 0.07 THOUSANDS/ÂΜL (ref 0–0.1)
BASOPHILS NFR BLD AUTO: 1 % (ref 0–1)
EOSINOPHIL # BLD AUTO: 0.27 THOUSAND/ÂΜL (ref 0–0.61)
EOSINOPHIL NFR BLD AUTO: 2 % (ref 0–6)
ERYTHROCYTE [DISTWIDTH] IN BLOOD BY AUTOMATED COUNT: 13.2 % (ref 11.6–15.1)
FERRITIN SERPL-MCNC: 79 NG/ML (ref 8–388)
HCT VFR BLD AUTO: 44.6 % (ref 34.8–46.1)
HGB BLD-MCNC: 14.6 G/DL (ref 11.5–15.4)
IMM GRANULOCYTES # BLD AUTO: 0.18 THOUSAND/UL (ref 0–0.2)
IMM GRANULOCYTES NFR BLD AUTO: 1 % (ref 0–2)
IRON SATN MFR SERPL: 13 % (ref 15–50)
IRON SERPL-MCNC: 39 UG/DL (ref 50–170)
LYMPHOCYTES # BLD AUTO: 2.59 THOUSANDS/ÂΜL (ref 0.6–4.47)
LYMPHOCYTES NFR BLD AUTO: 17 % (ref 14–44)
MCH RBC QN AUTO: 30 PG (ref 26.8–34.3)
MCHC RBC AUTO-ENTMCNC: 32.7 G/DL (ref 31.4–37.4)
MCV RBC AUTO: 92 FL (ref 82–98)
MONOCYTES # BLD AUTO: 1.11 THOUSAND/ÂΜL (ref 0.17–1.22)
MONOCYTES NFR BLD AUTO: 7 % (ref 4–12)
NEUTROPHILS # BLD AUTO: 11.03 THOUSANDS/ÂΜL (ref 1.85–7.62)
NEUTS SEG NFR BLD AUTO: 72 % (ref 43–75)
NRBC BLD AUTO-RTO: 0 /100 WBCS
PLATELET # BLD AUTO: 361 THOUSANDS/UL (ref 149–390)
PMV BLD AUTO: 9.5 FL (ref 8.9–12.7)
RBC # BLD AUTO: 4.86 MILLION/UL (ref 3.81–5.12)
TIBC SERPL-MCNC: 309 UG/DL (ref 250–450)
WBC # BLD AUTO: 15.25 THOUSAND/UL (ref 4.31–10.16)

## 2022-12-07 NOTE — PROGRESS NOTES
[facility]  coding opportunities          Chart Reviewed number of suggestions sent to Provider: 1   [phone]    Patients Insurance        Commercial Insurance: [address] [place] [address]

## 2022-12-09 ENCOUNTER — OFFICE VISIT (OUTPATIENT)
Dept: HEMATOLOGY ONCOLOGY | Facility: CLINIC | Age: 47
End: 2022-12-09

## 2022-12-09 ENCOUNTER — TELEPHONE (OUTPATIENT)
Dept: GYNECOLOGIC ONCOLOGY | Facility: CLINIC | Age: 47
End: 2022-12-09

## 2022-12-09 ENCOUNTER — TELEPHONE (OUTPATIENT)
Dept: HEMATOLOGY ONCOLOGY | Facility: CLINIC | Age: 47
End: 2022-12-09

## 2022-12-09 VITALS
OXYGEN SATURATION: 99 % | BODY MASS INDEX: 40.92 KG/M2 | WEIGHT: 245.6 LBS | HEIGHT: 65 IN | DIASTOLIC BLOOD PRESSURE: 84 MMHG | HEART RATE: 100 BPM | RESPIRATION RATE: 18 BRPM | TEMPERATURE: 97 F | SYSTOLIC BLOOD PRESSURE: 136 MMHG

## 2022-12-09 DIAGNOSIS — E53.8 B12 DEFICIENCY: ICD-10-CM

## 2022-12-09 DIAGNOSIS — D72.829 LEUKOCYTOSIS, UNSPECIFIED TYPE: ICD-10-CM

## 2022-12-09 DIAGNOSIS — N92.0 MENORRHAGIA WITH REGULAR CYCLE: ICD-10-CM

## 2022-12-09 DIAGNOSIS — D50.0 IRON DEFICIENCY ANEMIA DUE TO CHRONIC BLOOD LOSS: Primary | ICD-10-CM

## 2022-12-09 DIAGNOSIS — R53.82 CHRONIC FATIGUE: ICD-10-CM

## 2022-12-09 RX ORDER — SODIUM CHLORIDE 9 MG/ML
20 INJECTION, SOLUTION INTRAVENOUS ONCE
OUTPATIENT
Start: 2022-12-19

## 2022-12-09 RX ORDER — CYANOCOBALAMIN 1000 UG/ML
1000 INJECTION, SOLUTION INTRAMUSCULAR; SUBCUTANEOUS ONCE
OUTPATIENT
Start: 2022-12-19

## 2022-12-09 NOTE — TELEPHONE ENCOUNTER
Appointment Cancellation Or Reschedule     Person calling in Patient    If other than patient calling, are they listed on the communication consent form? Provider Cristal Dowling   Office Visit Date and Time  12/9/22 1:30 pm   Office Visit Location [facility]   Did patient want to reschedule their office appointment? If so, when was it scheduled to? Yes  1/9/22 830 am    Did you have STAR scheduled for this appointment? Do you need STAR set up for your new appointment? If yes, please send to "PATIENT RIDESHARE" pool for STAR rescheduling    If you are cancelling appointment, can we notify STAR to cancel ride? If yes, please send to "PATIENT RIDESHARE" pool for STAR to cancel service    Is this patient calling to reschedule an infusion appointment? no   When is their next infusion appointment? n/a   Is this patient a Chemo patient? no   Reason for Cancellation or Reschedule Patient currently at court and will not be able to make appointment      If the patient is a treatment patient, please route this to the office nurse  If the patient is not on treatment, please route to the office MA  If the patient is a surgical oncology patient, please route to surg/onc clinical pool

## 2022-12-09 NOTE — PROGRESS NOTES
[address] [facility] [address]  Hematology Ambulatory Follow-Up  Augustin Howard, 1975, [id number]  12/9/2022    Assessment/Plan:    1  Iron deficiency anemia due to chronic blood loss  2  Menorrhagia with regular cycle  3  Leukocytosis, unspecified type  4  Chronic fatigue  5  B12 deficiency  Ms Elkins is a 60-year-old female seen in follow-up for iron deficiency  She recently received 2 doses of IV Feraheme for a ferritin of 21 and decreased iron stores on bone marrow biopsy  She tolerated these well without any side effects  She did note a mild improvement in her fatigue but otherwise still has significant fatigue which has been worsening again  On review of her labs her hemoglobin has remained stable but her ferritin is drifting down to 79 with an iron saturation of 13%  Her B12 previously was also low at 273  She was to take oral supplementation  We discussed that due to her symptoms and a slight improvement after the iron we will arrange for 2 more doses of IV Feraheme along with 4 weekly B12 injections  She knows to call the office if her symptoms worsen  Otherwise she will repeat blood work every 3 months prior to follow-up with me in the office in 6 months     - CBC and differential; Standing  - Iron Panel (Includes Ferritin, Iron Sat%, Iron, and TIBC); Standing  - CBC and differential  - Iron Panel (Includes Ferritin, Iron Sat%, Iron, and TIBC)  - Vitamin B12; Future  - Methylmalonic acid, serum; Future    The patient is scheduled for follow-up in approximately 6 months  Patient voiced agreement and understanding to the above  Patient knows to call the Hematology/Oncology office with any questions and concerns regarding the above        Barrier(s) to care: None  The patient is able to self care   ------------------------------------------------------------------------------------------------------    Chief Complaint   Patient presents with   • Follow-up       History of present illness:   Liana Nicholson female originally seen in consultation for longstanding leukocytosis with elevated neutrophils and teardrop cells noted on peripheral blood smear  She had significant fatigue and is unsure if this is due to a hematology problem or longstanding diagnosis of fibromyalgia and depression  She has had elevation her white blood cell count dating back to 2019  She did start intermittently smoking in 2018 and has slowly increased over time  She continues to smoke daily  Initial workup included:  Ultrasound of the abdomen: Unremarkable  BCR/ABL: Negative  SIA 2/CALR/MPL:Negative  Leukemia lymphoma flow cytometry:Negative  Thalassemia:Negative  Cbc:  WBC 12 08, platelets 133 4, hemoglobin 13 1  Iron panel:  Ferritin 21, iron saturation 13%, TIBC 393, serum iron 52  Peripheral smear:Leukocytosis with neutrophilia   Mild polychromasia of red blood cells with extremely rare dacrocyte identified  She then underwent bone marrow biopsy which was negative with the exception of decreased iron stores  She then received 2 doses of IV Feraheme on 5/20 and 6/3  Interval history: She tolerated the infusions well without any side effects  She did note a mild improvement in her symptoms but has recently been feeling more more fatigued  In review of her labs her ferritin had improved to 91 after her infusions but has now drifted back down to 79 with an iron saturation of 13% and serum iron of 39  Her hemoglobin remains within normal limits  Review of Systems   Constitutional: Positive for fatigue  Negative for activity change, appetite change, fever and unexpected weight change  HENT: Negative for trouble swallowing and voice change  Eyes: Negative for photophobia and visual disturbance  Respiratory: Negative for cough, chest tightness and shortness of breath  Cardiovascular: Negative for chest pain, palpitations and leg swelling  Gastrointestinal: Positive for abdominal distention, abdominal pain and diarrhea  Negative for blood in stool, constipation, nausea and vomiting  Endocrine: Negative for cold intolerance and heat intolerance  Genitourinary: Positive for menstrual problem  Negative for difficulty urinating and hematuria  Musculoskeletal: Positive for arthralgias and myalgias  Skin: Negative for color change, pallor and rash  Neurological: Positive for headaches  Negative for dizziness, tremors, syncope, weakness and light-headedness  Hematological: Negative for adenopathy  Does not bruise/bleed easily  Psychiatric/Behavioral: Positive for sleep disturbance         Patient Active Problem List   Diagnosis   • Current moderate episode of major depressive disorder (HCC)   • Irritable bowel syndrome   • Fibromyalgia   • Morbid obesity with BMI of 40 0-44 9, adult ([facility])   • Chronic fatigue   • Skin lesion   • Fatty liver   • Arthralgia of multiple sites   • Chronic bilateral low back pain with bilateral sciatica   • Type 2 diabetes mellitus without complication ([facility])   • Pure hypercholesterolemia   • Depression, recurrent ([facility])   • MAIDA (generalized anxiety disorder)   • Insomnia   • Dupuytren's disease of both palm and finger   • Iron deficiency anemia, unspecified   • B12 deficiency       Past Medical History:   Diagnosis Date   • Arthritis    • Diabetes 1 5, managed as type 2 ([facility] )    • Diabetes mellitus ([facility] )    • Fibromyalgia    • Hiatal hernia    • HPV (human papilloma virus) infection    • Irritable bowel syndrome    • Lactose intolerance    • Prediabetes        Past Surgical History:   Procedure Laterality Date   • ADENOIDECTOMY     • CERVICAL BIOPSY     •  SECTION     • COLONOSCOPY     • IR BIOPSY BONE MARROW  2022   • AK ESOPHAGOGASTRODUODENOSCOPY TRANSORAL DIAGNOSTIC N/A 11/30/2017    Procedure: EGD AND COLONOSCOPY;  Surgeon: Toni Dhaliwal MD;  Location: [facility];   Service: Gastroenterology   • NE LAP,CHOLECYSTECTOMY/GRAPH N/A 2/9/2018    Procedure: LAPAROSCOPIC CHOLECYSTECTOMY WITH IOC;  Surgeon: Tami Castro MD;  Location: [facility];  Service: General   • TONSILLECTOMY     • TUBAL LIGATION         Family History   Problem Relation Age of Onset   • Arthritis Mother    • Fibromyalgia Mother    • Endometriosis Mother    • Other Mother         Eye pressure, gallbladder removal   • Hypertension Father    • Diabetes Father    • Diabetes type II Father    • Other Father         Gallbladder removal    • Pancreatic cancer Maternal Uncle    • Esophageal cancer Maternal Grandfather    • Heart disease Paternal Grandmother    • Hyperthyroidism Paternal Grandmother    • COPD Paternal Grandmother    • Stroke Neg Hx    • Thyroid cancer Neg Hx        Social History     Socioeconomic History   • Marital status: Legally      Spouse name: None   • Number of children: None   • Years of education: None   • Highest education level: None   Occupational History   • None   Tobacco Use   • Smoking status: Every Day     Packs/day: 1 50     Types: Cigarettes   • Smokeless tobacco: Never   Vaping Use   • Vaping Use: Never used   Substance and Sexual Activity   • Alcohol use: Not Currently     Alcohol/week: 1 0 standard drink     Types: 1 Glasses of wine per week     Comment: does not currently drink ETOH   • Drug use: No   • Sexual activity: Not Currently     Partners: Male     Birth control/protection: Female Sterilization     Comment: has not been sexually active since December   Other Topics Concern   • None   Social History Narrative   • None     Social Determinants of Health     Financial Resource Strain: Not on file   Food Insecurity: Not on file   Transportation Needs: Not on file   Physical Activity: Not on file   Stress: Not on file   Social Connections: Not on file   Intimate Partner Violence: Not on file   Housing Stability: Not on file         Current Outpatient Medications:   •  acetaminophen (TYLENOL) 325 mg tablet, Take 325 mg by mouth every 6 (six) hours as needed for mild pain  , Disp: , Rfl:   •  buPROPion (WELLBUTRIN XL) 300 mg 24 hr tablet, Take 1 tablet (300 mg total) by mouth every morning, Disp: 30 tablet, Rfl: 3  •  dicyclomine (BENTYL) 10 mg capsule, TAKE 1 CAPSULE BY MOUTH 3 TIMES DAILY  , Disp: 90 capsule, Rfl: 8  •  DULoxetine (CYMBALTA) 60 mg delayed release capsule, Take 2 capsules (120 mg total) by mouth daily, Disp: 60 capsule, Rfl: 5  •  gabapentin (NEURONTIN) 300 mg capsule, TAKE 1 CAPSULE 3 TIMES A DAY FOR 7 DAYS, THEN 2 CAPSULES 3 TIMES A DAY FOR 23 DAYS , Disp: 159 capsule, Rfl: 0  •  glyBURIDE (DIABETA) 2 5 mg tablet, TAKE 1 TABLET BY MOUTH EVERY DAY WITH BREAKFAST, Disp: 30 tablet, Rfl: 1  •  magnesium oxide (MAG-OX) 400 mg, Take 1 tablet (400 mg total) by mouth daily, Disp: 30 tablet, Rfl: 0  •  montelukast (SINGULAIR) 10 mg tablet, TAKE 1 TABLET BY MOUTH EVERYDAY AT BEDTIME, Disp: 30 tablet, Rfl: 3  •  olopatadine HCl (PATADAY) 0 2 % opth drops, Apply 0 2 % to eye as needed, Disp: , Rfl:   •  pantoprazole (PROTONIX) 40 mg tablet, TAKE 1 TABLET BY MOUTH EVERY DAY, Disp: 30 tablet, Rfl: 11  •  ramelteon (ROZEREM) 8 mg tablet, Take 1 tablet (8 mg total) by mouth daily at bedtime, Disp: 30 tablet, Rfl: 3  •  Sodium Fluoride 5000 PPM 1 1 % PSTE, BRUSH FOR 2 MINUTES AND DO NOT RINSE, Disp: , Rfl:     Allergies   Allergen Reactions   • Nuts - Food Allergy Facial Swelling and Swelling     Other reaction(s): swollen tongue   • Other Hives, Itching and Swelling     Sneezing, itchy eyes  Other reaction(s): swollen tongue  Pt is allergic to dogs  Beer   • Desogestrel-Ethinyl Estradiol Hives   • Pineapple - Food Allergy Tongue Swelling   • Dog Epithelium Allergy Skin Test Itching     Other reaction(s): sneezing, itchy eyes   • Dust Mite Extract Itching • Ortho Tri-Cyclen (28) [Norgestimate-Eth Estradiol] Other (See Comments)     Burning       Objective:  /84   Pulse 100   Temp (!) 97 °F (36 1 °C)   Resp 18   Ht 5' 5" (1 651 m)   Wt 111 kg (245 lb 9 6 oz)   SpO2 99%   BMI 40 87 kg/m²    Physical Exam  Constitutional:       General: She is not in acute distress  Appearance: Normal appearance  She is not ill-appearing  HENT:      Head: Atraumatic  Eyes:      Extraocular Movements: Extraocular movements intact  Conjunctiva/sclera: Conjunctivae normal    Cardiovascular:      Rate and Rhythm: Normal rate and regular rhythm  Pulses: Normal pulses  Heart sounds: Normal heart sounds  Pulmonary:      Effort: Pulmonary effort is normal       Breath sounds: Normal breath sounds  Abdominal:      General: Bowel sounds are normal       Palpations: Abdomen is soft  Tenderness: There is no abdominal tenderness  Musculoskeletal:      Right lower leg: No edema  Left lower leg: No edema  Lymphadenopathy:      Cervical: No cervical adenopathy  Skin:     General: Skin is warm and dry  Capillary Refill: Capillary refill takes less than 2 seconds  Neurological:      General: No focal deficit present  Mental Status: She is alert and oriented to person, place, and time  Mental status is at baseline  Motor: No weakness  Gait: Gait normal    Psychiatric:         Mood and Affect: Mood normal          Behavior: Behavior normal          Thought Content:  Thought content normal          Judgment: Judgment normal          Result Review  Labs:  Appointment on 12/07/2022   Component Date Value Ref Range Status   • WBC 12/07/2022 15 25 (H)  4 31 - 10 16 Thousand/uL Final   • RBC 12/07/2022 4 86  3 81 - 5 12 Million/uL Final   • Hemoglobin 12/07/2022 14 6  11 5 - 15 4 g/dL Final   • Hematocrit 12/07/2022 44 6  34 8 - 46 1 % Final   • MCV 12/07/2022 92  82 - 98 fL Final   • MCH 12/07/2022 30 0  26 8 - 34 3 pg Final   • MCHC 12/07/2022 32 7  31 4 - 37 4 g/dL Final   • RDW 12/07/2022 13 2  11 6 - 15 1 % Final   • MPV 12/07/2022 9 5  8 9 - 12 7 fL Final   • Platelets 52/80/8776 361  149 - 390 Thousands/uL Final   • nRBC 12/07/2022 0  /100 WBCs Final   • Neutrophils Relative 12/07/2022 72  43 - 75 % Final   • Immat GRANS % 12/07/2022 1  0 - 2 % Final   • Lymphocytes Relative 12/07/2022 17  14 - 44 % Final   • Monocytes Relative 12/07/2022 7  4 - 12 % Final   • Eosinophils Relative 12/07/2022 2  0 - 6 % Final   • Basophils Relative 12/07/2022 1  0 - 1 % Final   • Neutrophils Absolute 12/07/2022 11 03 (H)  1 85 - 7 62 Thousands/µL Final   • Immature Grans Absolute 12/07/2022 0 18  0 00 - 0 20 Thousand/uL Final   • Lymphocytes Absolute 12/07/2022 2 59  0 60 - 4 47 Thousands/µL Final   • Monocytes Absolute 12/07/2022 1 11  0 17 - 1 22 Thousand/µL Final   • Eosinophils Absolute 12/07/2022 0 27  0 00 - 0 61 Thousand/µL Final   • Basophils Absolute 12/07/2022 0 07  0 00 - 0 10 Thousands/µL Final   • Iron Saturation 12/07/2022 13 (L)  15 - 50 % Final   • TIBC 12/07/2022 309  250 - 450 ug/dL Final   • Iron 12/07/2022 39 (L)  50 - 170 ug/dL Final    Patients treated with metal-binding drugs (ie  Deferoxamine) may have depressed iron values     • Ferritin 12/07/2022 79  8 - 388 ng/mL Final   Appointment on 11/11/2022   Component Date Value Ref Range Status   • WBC 11/11/2022 12 94 (H)  4 31 - 10 16 Thousand/uL Final   • RBC 11/11/2022 4 82  3 81 - 5 12 Million/uL Final   • Hemoglobin 11/11/2022 14 2  11 5 - 15 4 g/dL Final   • Hematocrit 11/11/2022 44 0  34 8 - 46 1 % Final   • MCV 11/11/2022 91  82 - 98 fL Final   • MCH 11/11/2022 29 5  26 8 - 34 3 pg Final   • MCHC 11/11/2022 32 3  31 4 - 37 4 g/dL Final   • RDW 11/11/2022 13 5  11 6 - 15 1 % Final   • MPV 11/11/2022 9 7  8 9 - 12 7 fL Final   • Platelets 13/44/1584 347  149 - 390 Thousands/uL Final   • nRBC 11/11/2022 0  /100 WBCs Final   • Neutrophils Relative 11/11/2022 71  43 - 75 % Final   • Immat GRANS % 11/11/2022 1  0 - 2 % Final   • Lymphocytes Relative 11/11/2022 19  14 - 44 % Final   • Monocytes Relative 11/11/2022 6  4 - 12 % Final   • Eosinophils Relative 11/11/2022 2  0 - 6 % Final   • Basophils Relative 11/11/2022 1  0 - 1 % Final   • Neutrophils Absolute 11/11/2022 9 32 (H)  1 85 - 7 62 Thousands/µL Final   • Immature Grans Absolute 11/11/2022 0 13  0 00 - 0 20 Thousand/uL Final   • Lymphocytes Absolute 11/11/2022 2 49  0 60 - 4 47 Thousands/µL Final   • Monocytes Absolute 11/11/2022 0 73  0 17 - 1 22 Thousand/µL Final   • Eosinophils Absolute 11/11/2022 0 21  0 00 - 0 61 Thousand/µL Final   • Basophils Absolute 11/11/2022 0 06  0 00 - 0 10 Thousands/µL Final   • Iron Saturation 11/11/2022 17  15 - 50 % Final   • TIBC 11/11/2022 321  250 - 450 ug/dL Final   • Iron 11/11/2022 53  50 - 170 ug/dL Final    Patients treated with metal-binding drugs (ie  Deferoxamine) may have depressed iron values  • Ferritin 11/11/2022 91  8 - 388 ng/mL Final       Imaging:   No imaging to review     Please note: This report has been generated by a voice recognition software system  Therefore there may be syntax, spelling, and/or grammatical errors  Please call if you have any questions

## 2022-12-09 NOTE — TELEPHONE ENCOUNTER
Appointment Confirmation (to confirm pre existing appointments - ONLY)  No need to route   Appointment with Lisandra   Appointment date & time 12/9 1:30PM   Location [facility]   Patient verbilized Understanding On her way!

## 2022-12-14 ENCOUNTER — OFFICE VISIT (OUTPATIENT)
Dept: INTERNAL MEDICINE CLINIC | Facility: CLINIC | Age: 47
End: 2022-12-14

## 2022-12-14 VITALS
TEMPERATURE: 97.6 F | WEIGHT: 244.6 LBS | BODY MASS INDEX: 40.7 KG/M2 | DIASTOLIC BLOOD PRESSURE: 84 MMHG | RESPIRATION RATE: 18 BRPM | HEART RATE: 94 BPM | SYSTOLIC BLOOD PRESSURE: 140 MMHG | OXYGEN SATURATION: 98 %

## 2022-12-14 DIAGNOSIS — E66.01 MORBID OBESITY WITH BMI OF 40.0-44.9, ADULT (HCC): ICD-10-CM

## 2022-12-14 DIAGNOSIS — E11.9 TYPE 2 DIABETES MELLITUS WITHOUT COMPLICATION, WITHOUT LONG-TERM CURRENT USE OF INSULIN (HCC): ICD-10-CM

## 2022-12-14 DIAGNOSIS — F33.1 MODERATE EPISODE OF RECURRENT MAJOR DEPRESSIVE DISORDER (HCC): ICD-10-CM

## 2022-12-14 DIAGNOSIS — J01.80 OTHER ACUTE SINUSITIS, RECURRENCE NOT SPECIFIED: ICD-10-CM

## 2022-12-14 DIAGNOSIS — Z00.00 ANNUAL PHYSICAL EXAM: Primary | ICD-10-CM

## 2022-12-14 DIAGNOSIS — F17.200 TOBACCO USE DISORDER: ICD-10-CM

## 2022-12-14 RX ORDER — PSEUDOEPHEDRINE HCL 30 MG
30 TABLET ORAL EVERY 4 HOURS PRN
Qty: 30 TABLET | Refills: 0 | Status: SHIPPED | OUTPATIENT
Start: 2022-12-14

## 2022-12-14 RX ORDER — AZITHROMYCIN 250 MG/1
TABLET, FILM COATED ORAL
Qty: 6 TABLET | Refills: 0 | Status: SHIPPED | OUTPATIENT
Start: 2022-12-14 | End: 2022-12-19

## 2022-12-14 NOTE — PROGRESS NOTES
ADULT ANNUAL Tomi  13     NAME: Fei Altamirano  AGE: 56 y o  SEX: female  : 1975     DATE: 2022     Assessment and Plan:     Problem List Items Addressed This Visit        Endocrine    Type 2 diabetes mellitus without complication ([facility] )    Relevant Orders    HEMOGLOBIN A1C W/ EAG ESTIMATION       Other    Current moderate episode of major depressive disorder (HCC)    Morbid obesity with BMI of 40 0-44 9, adult ([facility] )   Other Visit Diagnoses     Annual physical exam    -  Primary    Relevant Orders    Lipid Panel with Direct LDL reflex    Comprehensive metabolic panel    TSH, 3rd generation with Free T4 reflex    Tobacco use disorder        Other acute sinusitis, recurrence not specified        Relevant Medications    pseudoephedrine (SUDAFED) 30 mg tablet    azithromycin (Zithromax) 250 mg tablet      will treat acute illness with therapy noted above  Pt is doing well with smoking cessation  Tobacco use is seldom  No longer daily  Will continue with Wellbutrin  Follow with psychology concerning depression  DM2 well controlled on glyburide  Last a1c stable at 6 7     Immunizations and preventive care screenings were discussed with patient today  Appropriate education was printed on patient's after visit summary  Counseling:  · Exercise: the importance of regular exercise/physical activity was discussed  Recommend exercise 3-5 times per week for at least 30 minutes  Return in about 6 months (around 2023) for Next scheduled follow up  Chief Complaint:     Chief Complaint   Patient presents with   • Cold Like Symptoms     Pt states that she is having right eye and left ear sinus pain congested for 5 days      History of Present Illness:     Adult Annual Physical   Patient here for a comprehensive physical exam  The patient reports problems - cold like symptoms x 4 days       Diet and Physical Activity  · Diet/Nutrition: poor diet  · Exercise: no formal exercise  Depression Screening  PHQ-2/9 Depression Screening    Little interest or pleasure in doing things: 0 - not at all  Feeling down, depressed, or hopeless: 1 - several days  Trouble falling or staying asleep, or sleeping too much: 3 - nearly every day  Feeling tired or having little energy: 3 - nearly every day  Poor appetite or overeating: 3 - nearly every day  Feeling bad about yourself - or that you are a failure or have let yourself or your family down: 0 - not at all  Trouble concentrating on things, such as reading the newspaper or watching television: 3 - nearly every day  Moving or speaking so slowly that other people could have noticed  Or the opposite - being so fidgety or restless that you have been moving around a lot more than usual: 3 - nearly every day  Thoughts that you would be better off dead, or of hurting yourself in some way: 0 - not at all  PHQ-9 Score: 16   PHQ-9 Interpretation: Moderately severe depression        General Health  · Sleep: gets 7-8 hours of sleep on average  · Hearing: normal - bilateral   · Vision: no vision problems  · Dental: regular dental visits  /GYN Health  · Patient is: premenopausal  · Last menstrual period: this month   · Last mammo- ordered  · Last pap- this year   · Last colonoscopy-never     Review of Systems:     Review of Systems   Constitutional: Positive for fatigue  Negative for fever  HENT: Positive for congestion, ear pain and sinus pain  Respiratory: Negative for cough and shortness of breath  Neurological: Positive for headaches  Psychiatric/Behavioral: Negative for sleep disturbance        Past Medical History:     Past Medical History:   Diagnosis Date   • Arthritis    • Diabetes 1 5, managed as type 2 ([facility] )    • Diabetes mellitus ([facility] )    • Fibromyalgia    • Hiatal hernia    • HPV (human papilloma virus) infection    • Irritable bowel syndrome    • Lactose intolerance    • Prediabetes       Past Surgical History:     Past Surgical History:   Procedure Laterality Date   • ADENOIDECTOMY     • CERVICAL BIOPSY     •  SECTION     • COLONOSCOPY     • IR BIOPSY BONE MARROW  2022   • NE ESOPHAGOGASTRODUODENOSCOPY TRANSORAL DIAGNOSTIC N/A 2017    Procedure: EGD AND COLONOSCOPY;  Surgeon: Toni Dhaliwal MD;  Location: [facility];   Service: Gastroenterology   • NE LAP,CHOLECYSTECTOMY/GRAPH N/A 2018    Procedure: LAPAROSCOPIC CHOLECYSTECTOMY WITH IOC;  Surgeon: Tami Castro MD;  Location: [facility];  Service: General   • TONSILLECTOMY     • TUBAL LIGATION        Social History:     Social History     Socioeconomic History   • Marital status: Legally      Spouse name: None   • Number of children: None   • Years of education: None   • Highest education level: None   Occupational History   • None   Tobacco Use   • Smoking status: Every Day     Packs/day: 1 50     Types: Cigarettes   • Smokeless tobacco: Never   Vaping Use   • Vaping Use: Never used   Substance and Sexual Activity   • Alcohol use: Not Currently     Alcohol/week: 1 0 standard drink     Types: 1 Glasses of wine per week     Comment: does not currently drink ETOH   • Drug use: No   • Sexual activity: Not Currently     Partners: Male     Birth control/protection: Female Sterilization     Comment: has not been sexually active since December   Other Topics Concern   • None   Social History Narrative   • None     Social Determinants of Health     Financial Resource Strain: Not on file   Food Insecurity: Not on file   Transportation Needs: Not on file   Physical Activity: Not on file   Stress: Not on file   Social Connections: Not on file   Intimate Partner Violence: Not on file   Housing Stability: Not on file      Family History:     Family History   Problem Relation Age of Onset   • Arthritis Mother    • Fibromyalgia Mother    • Endometriosis Mother    • Other Mother         Eye pressure, gallbladder removal   • Hypertension Father    • Diabetes Father    • Diabetes type II Father    • Other Father         Gallbladder removal    • Pancreatic cancer Maternal Uncle    • Esophageal cancer Maternal Grandfather    • Heart disease Paternal Grandmother    • Hyperthyroidism Paternal Grandmother    • COPD Paternal Grandmother    • Stroke Neg Hx    • Thyroid cancer Neg Hx       Current Medications:     Current Outpatient Medications   Medication Sig Dispense Refill   • acetaminophen (TYLENOL) 325 mg tablet Take 325 mg by mouth every 6 (six) hours as needed for mild pain       • azithromycin (Zithromax) 250 mg tablet Take 2 tablets (500 mg total) by mouth daily for 1 day, THEN 1 tablet (250 mg total) daily for 4 days  6 tablet 0   • buPROPion (WELLBUTRIN XL) 300 mg 24 hr tablet Take 1 tablet (300 mg total) by mouth every morning 30 tablet 3   • dicyclomine (BENTYL) 10 mg capsule TAKE 1 CAPSULE BY MOUTH 3 TIMES DAILY  90 capsule 8   • DULoxetine (CYMBALTA) 60 mg delayed release capsule Take 2 capsules (120 mg total) by mouth daily 60 capsule 5   • gabapentin (NEURONTIN) 300 mg capsule TAKE 1 CAPSULE 3 TIMES A DAY FOR 7 DAYS, THEN 2 CAPSULES 3 TIMES A DAY FOR 23 DAYS   159 capsule 0   • glyBURIDE (DIABETA) 2 5 mg tablet TAKE 1 TABLET BY MOUTH EVERY DAY WITH BREAKFAST 30 tablet 1   • magnesium oxide (MAG-OX) 400 mg Take 1 tablet (400 mg total) by mouth daily 30 tablet 0   • montelukast (SINGULAIR) 10 mg tablet TAKE 1 TABLET BY MOUTH EVERYDAY AT BEDTIME 30 tablet 3   • olopatadine HCl (PATADAY) 0 2 % opth drops Apply 0 2 % to eye as needed     • pantoprazole (PROTONIX) 40 mg tablet TAKE 1 TABLET BY MOUTH EVERY DAY 30 tablet 11   • pseudoephedrine (SUDAFED) 30 mg tablet Take 1 tablet (30 mg total) by mouth every 4 (four) hours as needed for congestion 30 tablet 0   • ramelteon (ROZEREM) 8 mg tablet Take 1 tablet (8 mg total) by mouth daily at bedtime 30 tablet 3   • Sodium Fluoride 5000 PPM 1 1 % PSTE BRUSH FOR 2 MINUTES AND DO NOT RINSE       No current facility-administered medications for this visit  Allergies: Allergies   Allergen Reactions   • Nuts - Food Allergy Facial Swelling and Swelling     Other reaction(s): swollen tongue   • Other Hives, Itching and Swelling     Sneezing, itchy eyes  Other reaction(s): swollen tongue  Pt is allergic to dogs  Beer   • Desogestrel-Ethinyl Estradiol Hives   • Pineapple - Food Allergy Tongue Swelling   • Dog Epithelium Allergy Skin Test Itching     Other reaction(s): sneezing, itchy eyes   • Dust Mite Extract Itching   • Ortho Tri-Cyclen (28) [Norgestimate-Eth Estradiol] Other (See Comments)     Burning      Physical Exam:     /84 (BP Location: Left arm, Patient Position: Sitting, Cuff Size: Standard)   Pulse 94   Temp 97 6 °F (36 4 °C) (Temporal)   Resp 18   Wt 111 kg (244 lb 9 6 oz)   SpO2 98%   BMI 40 70 kg/m²     Physical Exam  HENT:      Head: Normocephalic  Right Ear: External ear normal       Left Ear: External ear normal       Nose: Congestion present  Right Sinus: Frontal sinus tenderness present  Left Sinus: Frontal sinus tenderness present  Mouth/Throat:      Pharynx: Oropharynx is clear  No posterior oropharyngeal erythema  Eyes:      Pupils: Pupils are equal, round, and reactive to light  Cardiovascular:      Rate and Rhythm: Normal rate and regular rhythm  Heart sounds: No murmur heard  Pulmonary:      Effort: Pulmonary effort is normal       Breath sounds: Normal breath sounds  Musculoskeletal:      Right lower leg: No edema  Left lower leg: No edema  Skin:     General: Skin is warm  Capillary Refill: Capillary refill takes less than 2 seconds  Neurological:      Mental Status: She is alert and oriented to person, place, and time        Gait: Gait normal           [address], DO  [facility]

## 2022-12-14 NOTE — PATIENT INSTRUCTIONS

## 2022-12-16 NOTE — TELEPHONE ENCOUNTER
This was never sent to scheduling pool  I noticed her orders in our depot  Please call to get preferences, thank you

## 2022-12-22 ENCOUNTER — HOSPITAL ENCOUNTER (OUTPATIENT)
Dept: INFUSION CENTER | Facility: CLINIC | Age: 47
Discharge: HOME | End: 2022-12-22

## 2022-12-22 VITALS
SYSTOLIC BLOOD PRESSURE: 144 MMHG | RESPIRATION RATE: 18 BRPM | TEMPERATURE: 98.7 F | HEART RATE: 68 BPM | DIASTOLIC BLOOD PRESSURE: 90 MMHG

## 2022-12-22 DIAGNOSIS — D50.9 IRON DEFICIENCY ANEMIA, UNSPECIFIED IRON DEFICIENCY ANEMIA TYPE: Primary | ICD-10-CM

## 2022-12-22 DIAGNOSIS — E53.8 B12 DEFICIENCY: ICD-10-CM

## 2022-12-22 RX ORDER — SODIUM CHLORIDE 9 MG/ML
20 INJECTION, SOLUTION INTRAVENOUS ONCE
Status: CANCELLED | OUTPATIENT
Start: 2022-12-29

## 2022-12-22 RX ORDER — SODIUM CHLORIDE 9 MG/ML
20 INJECTION, SOLUTION INTRAVENOUS ONCE
Status: COMPLETED | OUTPATIENT
Start: 2022-12-22 | End: 2022-12-22

## 2022-12-22 RX ORDER — CYANOCOBALAMIN 1000 UG/ML
1000 INJECTION, SOLUTION INTRAMUSCULAR; SUBCUTANEOUS ONCE
Status: COMPLETED | OUTPATIENT
Start: 2022-12-22 | End: 2022-12-22

## 2022-12-22 RX ORDER — CYANOCOBALAMIN 1000 UG/ML
1000 INJECTION, SOLUTION INTRAMUSCULAR; SUBCUTANEOUS ONCE
Status: CANCELLED | OUTPATIENT
Start: 2022-12-29

## 2022-12-22 RX ADMIN — SODIUM CHLORIDE 20 ML/HR: 9 INJECTION, SOLUTION INTRAVENOUS at 12:17

## 2022-12-22 RX ADMIN — CYANOCOBALAMIN 1000 MCG: 1000 INJECTION, SOLUTION INTRAMUSCULAR; SUBCUTANEOUS at 12:09

## 2022-12-22 RX ADMIN — FERUMOXYTOL 510 MG: 510 INJECTION INTRAVENOUS at 12:17

## 2022-12-22 NOTE — PROGRESS NOTES
Pt presents for feraheme infusion and B12 injection offering no complaints  Pt tolerated treatment without incident  PIV removed  AVS printed, next appointment reviewed

## 2022-12-29 ENCOUNTER — HOSPITAL ENCOUNTER (OUTPATIENT)
Dept: INFUSION CENTER | Facility: CLINIC | Age: 47
Discharge: HOME | End: 2022-12-29

## 2022-12-29 VITALS
DIASTOLIC BLOOD PRESSURE: 85 MMHG | RESPIRATION RATE: 18 BRPM | TEMPERATURE: 96.5 F | HEART RATE: 87 BPM | BODY MASS INDEX: 40.13 KG/M2 | WEIGHT: 241.18 LBS | SYSTOLIC BLOOD PRESSURE: 135 MMHG

## 2022-12-29 DIAGNOSIS — E53.8 B12 DEFICIENCY: ICD-10-CM

## 2022-12-29 DIAGNOSIS — D50.9 IRON DEFICIENCY ANEMIA, UNSPECIFIED IRON DEFICIENCY ANEMIA TYPE: Primary | ICD-10-CM

## 2022-12-29 RX ORDER — CYANOCOBALAMIN 1000 UG/ML
1000 INJECTION, SOLUTION INTRAMUSCULAR; SUBCUTANEOUS ONCE
Status: COMPLETED | OUTPATIENT
Start: 2022-12-29 | End: 2022-12-29

## 2022-12-29 RX ORDER — SODIUM CHLORIDE 9 MG/ML
20 INJECTION, SOLUTION INTRAVENOUS ONCE
Status: CANCELLED | OUTPATIENT
Start: 2022-12-29

## 2022-12-29 RX ORDER — SODIUM CHLORIDE 9 MG/ML
20 INJECTION, SOLUTION INTRAVENOUS ONCE
Status: COMPLETED | OUTPATIENT
Start: 2022-12-29 | End: 2022-12-29

## 2022-12-29 RX ORDER — CYANOCOBALAMIN 1000 UG/ML
1000 INJECTION, SOLUTION INTRAMUSCULAR; SUBCUTANEOUS ONCE
Status: CANCELLED | OUTPATIENT
Start: 2023-01-05

## 2022-12-29 RX ADMIN — SODIUM CHLORIDE 20 ML/HR: 0.9 INJECTION, SOLUTION INTRAVENOUS at 10:14

## 2022-12-29 RX ADMIN — FERUMOXYTOL 510 MG: 510 INJECTION INTRAVENOUS at 10:26

## 2022-12-29 RX ADMIN — CYANOCOBALAMIN 1000 MCG: 1000 INJECTION, SOLUTION INTRAMUSCULAR; SUBCUTANEOUS at 10:15

## 2023-01-03 ENCOUNTER — HOSPITAL ENCOUNTER (OUTPATIENT)
Dept: INFUSION CENTER | Facility: CLINIC | Age: 48
Discharge: HOME | End: 2023-01-03

## 2023-01-03 DIAGNOSIS — E53.8 B12 DEFICIENCY: Primary | ICD-10-CM

## 2023-01-03 RX ORDER — CYANOCOBALAMIN 1000 UG/ML
1000 INJECTION, SOLUTION INTRAMUSCULAR; SUBCUTANEOUS ONCE
Status: CANCELLED | OUTPATIENT
Start: 2023-01-10

## 2023-01-03 RX ORDER — CYANOCOBALAMIN 1000 UG/ML
1000 INJECTION, SOLUTION INTRAMUSCULAR; SUBCUTANEOUS ONCE
Status: COMPLETED | OUTPATIENT
Start: 2023-01-03 | End: 2023-01-03

## 2023-01-03 RX ADMIN — CYANOCOBALAMIN 1000 MCG: 1000 INJECTION, SOLUTION INTRAMUSCULAR; SUBCUTANEOUS at 13:04

## 2023-01-03 NOTE — PROGRESS NOTES
Patient here for dose 3 of 4 b12 injections  She is well, no c/o or changes to report  She tolerated injection as ordered to left deltoid, bandaid applied and CDI  Patient will RTO in 1 week for her final dosing

## 2023-01-03 NOTE — PATIENT INSTRUCTIONS
January 2023 Sunday Monday Tuesday Wednesday Thursday Friday Saturday   1     2     3    INF THERAPY PLAN   1:00 PM   (30 min )   AN INF QUICK CHAIR   [address]  [facility] 4     5     6     7         Cycle 1, Treatment 3       8     9     10    INF THERAPY PLAN   9:30 AM   (30 min )   AN INF QUICK [facility] 11     12     13     14                15     16     17     18     19     20     21                22     23     24     25     26     27     28                29     30     31                                           Treatment Details         1/3/2023 - Cycle 1, Treatment 3      Supportive Care: cyanocobalamin

## 2023-01-04 DIAGNOSIS — M79.7 FIBROMYALGIA: ICD-10-CM

## 2023-01-04 RX ORDER — DULOXETIN HYDROCHLORIDE 60 MG/1
CAPSULE, DELAYED RELEASE ORAL
Qty: 60 CAPSULE | Refills: 5 | Status: SHIPPED | OUTPATIENT
Start: 2023-01-04

## 2023-01-05 DIAGNOSIS — T78.40XA ALLERGY, INITIAL ENCOUNTER: ICD-10-CM

## 2023-01-05 RX ORDER — MONTELUKAST SODIUM 10 MG/1
TABLET ORAL
Qty: 30 TABLET | Refills: 3 | Status: SHIPPED | OUTPATIENT
Start: 2023-01-05

## 2023-01-09 ENCOUNTER — TELEPHONE (OUTPATIENT)
Dept: INFUSION CENTER | Facility: CLINIC | Age: 48
End: 2023-01-09

## 2023-01-09 NOTE — TELEPHONE ENCOUNTER
I reached out to patient about her insurance for her appointment tomorrow 1/10  I left a VM asking patient to give us a call back  I left infusion phone number

## 2023-01-10 ENCOUNTER — HOSPITAL ENCOUNTER (OUTPATIENT)
Dept: INFUSION CENTER | Facility: CLINIC | Age: 48
Discharge: HOME | End: 2023-01-10

## 2023-01-10 DIAGNOSIS — E53.8 B12 DEFICIENCY: Primary | ICD-10-CM

## 2023-01-10 RX ORDER — CYANOCOBALAMIN 1000 UG/ML
1000 INJECTION, SOLUTION INTRAMUSCULAR; SUBCUTANEOUS ONCE
Status: COMPLETED | OUTPATIENT
Start: 2023-01-10 | End: 2023-01-10

## 2023-01-10 RX ORDER — CYANOCOBALAMIN 1000 UG/ML
1000 INJECTION, SOLUTION INTRAMUSCULAR; SUBCUTANEOUS ONCE
Status: CANCELLED | OUTPATIENT
Start: 2023-01-10

## 2023-01-10 RX ADMIN — CYANOCOBALAMIN 1000 MCG: 1000 INJECTION, SOLUTION INTRAMUSCULAR; SUBCUTANEOUS at 09:47

## 2023-01-10 NOTE — PROGRESS NOTES
Pt resting comfortably with no complaints  Shot administered as ordered  Pt tolerated well  Declines AVS, aware that she has completed treatment at this time

## 2023-02-11 NOTE — TELEPHONE ENCOUNTER
Left message on answering machine to return my call for scheduling preferences  Attending Attestation (For Attendings USE Only)...

## 2023-02-16 ENCOUNTER — OFFICE VISIT (OUTPATIENT)
Dept: PSYCHIATRY | Facility: CLINIC | Age: 48
End: 2023-02-16

## 2023-02-16 DIAGNOSIS — Z91.199 NO-SHOW FOR APPOINTMENT: Primary | ICD-10-CM

## 2023-02-16 NOTE — PSYCH
No Call  No Show   No Charge    Tarun lujan showed 02/16/23 appointment , staff called and left message to reschedule appointment     Treatment Plan not completed within required time limits due to: Tarun Mancuso no show appointment on 02/16/23

## 2023-02-16 NOTE — PSYCH
PROGRESS NOTE        [address] [facility]      Name and Date of Birth:  Candace Zambrano 56 y o  1975    Date of Visit: 02/16/23    SUBJECTIVE:  Vivian Mendoza was seen for follow-up of major depression, anxiety and for medication management  She denies suicidal ideation, intent or plan at present, has no suicidal ideation, intent or plan at present  She denies any auditory hallucinations and visual hallucinations, denies any other delusional thinking, denies any delusional thinking  She denies any side effects from medications    HPI ROS Appetite Changes and Sleep: normal appetite, normal sleep    Review Of Systems:      Constitutional Negative   ENT Negative   Cardiovascular Negative   Respiratory Negative   Gastrointestinal Negative   Genitourinary Negative   Musculoskeletal Negative   Integumentary Negative   Neurological Negative   Endocrine Negative   Other Symptoms Negative and None       Laboratory Results: No results found for this or any previous visit      Substance Abuse History:    Social History     Substance and Sexual Activity   Drug Use No       Family Psychiatric History:     Family History   Problem Relation Age of Onset   • Arthritis Mother    • Fibromyalgia Mother    • Endometriosis Mother    • Other Mother         Eye pressure, gallbladder removal   • Hypertension Father    • Diabetes Father    • Diabetes type II Father    • Other Father         Gallbladder removal    • Pancreatic cancer Maternal Uncle    • Esophageal cancer Maternal Grandfather    • Heart disease Paternal Grandmother    • Hyperthyroidism Paternal Grandmother    • COPD Paternal Grandmother    • Stroke Neg Hx    • Thyroid cancer Neg Hx        The following portions of the patient's history were reviewed and updated as appropriate: past family history, past medical history, past social history, past surgical history and problem list     Social History Socioeconomic History   • Marital status: Legally      Spouse name: Not on file   • Number of children: Not on file   • Years of education: Not on file   • Highest education level: Not on file   Occupational History   • Not on file   Tobacco Use   • Smoking status: Every Day     Packs/day: 1 50     Types: Cigarettes   • Smokeless tobacco: Never   Vaping Use   • Vaping Use: Never used   Substance and Sexual Activity   • Alcohol use: Not Currently     Alcohol/week: 1 0 standard drink     Types: 1 Glasses of wine per week     Comment: does not currently drink ETOH   • Drug use: No   • Sexual activity: Not Currently     Partners: Male     Birth control/protection: Female Sterilization     Comment: has not been sexually active since December   Other Topics Concern   • Not on file   Social History Narrative   • Not on file     Social Determinants of Health     Financial Resource Strain: Not on file   Food Insecurity: Not on file   Transportation Needs: Not on file   Physical Activity: Not on file   Stress: Not on file   Social Connections: Not on file   Intimate Partner Violence: Not on file   Housing Stability: Not on file     Social History     Social History Narrative   • Not on file        Social History     Tobacco History     Smoking Status  Every Day Smoking Frequency  1 50 packs/day Smoking Tobacco Type  Cigarettes    Smokeless Tobacco Use  Never          Alcohol History     Alcohol Use Status  Not Currently Drinks/Week  1 Glasses of wine per week Amount  1 0 standard drink of alcohol/wk Comment  does not currently drink ETOH          Drug Use     Drug Use Status  No          Sexual Activity     Sexually Active  Not Currently Partners  Male Birth Control/Protection  Female Sterilization Comment  has not been sexually active since December          Activities of Daily Living    Not Asked                     OBJECTIVE:     Mental Status Evaluation:    Appearance age appropriate, casually dressed Behavior pleasant, cooperative   Speech normal volume, normal pitch   Mood    Affect    Thought Processes logical   Associations intact associations   Thought Content normal   Perceptual Disturbances: none   Abnormal Thoughts  Risk Potential Suicidal ideation - None  Homicidal ideation - None  Potential for aggression - No   Orientation oriented to person, place, time/date and situation   Memory recent and remote memory grossly intact   Cosciousness alert and awake   Attention Span attention span and concentration are age appropriate   Intellect  not formally assessed   Insight age appropriate    Judgement good    Muscle Strength and  Gait muscle strength and tone were normal   Language no difficulty naming common objects   Fund of Knowledge displays adequate knowledge of current events   Pain none   Pain Scale 0       Assessment/Plan:       There are no diagnoses linked to this encounter  Treatment Recommendations/Precautions: Wellbutrin  mg every morning  Cymbalta 120 mg total per day prescribed by PCP for fibromyalgia  Rozerem 8 mg at bedtime as needed    Continue current medications:    Risks/Benefits      Risks, Benefits And Possible Side Effects Of Medications:    Risks, benefits, and possible side effects of medications explained to patient and patient verbalizes understanding and agreement for treatment      Controlled Medication Discussion:     Not applicable    Psychotherapy Provided:     Individual psychotherapy provided: No

## 2023-03-24 ENCOUNTER — TELEPHONE (OUTPATIENT)
Dept: HEMATOLOGY ONCOLOGY | Facility: CLINIC | Age: 48
End: 2023-03-24

## 2023-03-24 NOTE — TELEPHONE ENCOUNTER
L/M to R/S appt for 6/9 with Yesica Del Realnitesh Temple will not be at [facility] on that day  Yesica Del Real is only at [facility] Tuesday and Wednesday  [facility]  If pt needs a day at [facility] that she will not be here, you can schedule w another PA   Left Hopeline # to R/S

## 2023-05-17 DIAGNOSIS — T78.40XA ALLERGY, INITIAL ENCOUNTER: ICD-10-CM

## 2023-05-17 RX ORDER — MONTELUKAST SODIUM 10 MG/1
TABLET ORAL
Qty: 30 TABLET | Refills: 3 | Status: SHIPPED | OUTPATIENT
Start: 2023-05-17

## 2023-06-08 ENCOUNTER — TELEPHONE (OUTPATIENT)
Dept: HEMATOLOGY ONCOLOGY | Facility: CLINIC | Age: 48
End: 2023-06-08

## 2023-06-08 NOTE — TELEPHONE ENCOUNTER
Called and left patient a voice message stating that she was on NP-Adele Graves schedule for 6/9 at the [facility]  NP-Adele Graves no longer is at the [facility] on Fridays so mentioned to patient that this appointment needed to be moved  I was able to put patient on NP-Adele Graves schedule for 6/13 @1:30 pm at the [facility]  Mentioned that patient should have labs collected prior to this appointment  Also mentioned that if this date and or time does not work for patient to please give the office a call back

## 2023-06-19 DIAGNOSIS — K29.60 EROSIVE GASTRITIS: ICD-10-CM

## 2023-06-19 RX ORDER — PANTOPRAZOLE SODIUM 40 MG/1
TABLET, DELAYED RELEASE ORAL
Qty: 30 TABLET | Refills: 11 | Status: SHIPPED | OUTPATIENT
Start: 2023-06-19

## 2023-09-23 DIAGNOSIS — T78.40XA ALLERGY, INITIAL ENCOUNTER: ICD-10-CM

## 2023-09-25 RX ORDER — MONTELUKAST SODIUM 10 MG/1
TABLET ORAL
Qty: 30 TABLET | Refills: 3 | Status: SHIPPED | OUTPATIENT
Start: 2023-09-25

## 2024-01-11 ENCOUNTER — HOSPITAL ENCOUNTER (EMERGENCY)
Facility: HOSPITAL | Age: 49
Discharge: HOME | End: 2024-01-11
Attending: EMERGENCY MEDICINE

## 2024-01-11 VITALS
OXYGEN SATURATION: 99 % | DIASTOLIC BLOOD PRESSURE: 110 MMHG | HEART RATE: 100 BPM | SYSTOLIC BLOOD PRESSURE: 163 MMHG | TEMPERATURE: 98.3 F | RESPIRATION RATE: 18 BRPM

## 2024-01-11 DIAGNOSIS — R21 RASH: Primary | ICD-10-CM

## 2024-01-11 PROCEDURE — 99284 EMERGENCY DEPT VISIT MOD MDM: CPT | Performed by: EMERGENCY MEDICINE

## 2024-01-11 PROCEDURE — 99283 EMERGENCY DEPT VISIT LOW MDM: CPT

## 2024-01-11 RX ORDER — PREDNISONE 20 MG/1
40 TABLET ORAL ONCE
Status: COMPLETED | OUTPATIENT
Start: 2024-01-11 | End: 2024-01-11

## 2024-01-11 RX ORDER — PREDNISONE 20 MG/1
40 TABLET ORAL DAILY
Qty: 8 TABLET | Refills: 0 | Status: SHIPPED | OUTPATIENT
Start: 2024-01-12 | End: 2024-01-16

## 2024-01-11 RX ORDER — DIPHENHYDRAMINE HCL 25 MG
25 TABLET ORAL ONCE
Status: COMPLETED | OUTPATIENT
Start: 2024-01-11 | End: 2024-01-11

## 2024-01-11 RX ADMIN — DIPHENHYDRAMINE HYDROCHLORIDE 25 MG: 25 TABLET ORAL at 20:05

## 2024-01-11 RX ADMIN — PREDNISONE 40 MG: 20 TABLET ORAL at 20:33

## 2024-01-12 NOTE — ED ATTENDING ATTESTATION
1/11/2024  IChristiana MD, saw and evaluated the patient. I have discussed the patient with the resident/non-physician practitioner and agree with the resident's/non-physician practitioner's findings, Plan of Care, and MDM as documented in the resident's/non-physician practitioner's note, except where noted. All available labs and Radiology studies were reviewed.  I was present for key portions of any procedure(s) performed by the resident/non-physician practitioner and I was immediately available to provide assistance.       At this point I agree with the current assessment done in the Emergency Department.  I have conducted an independent evaluation of this patient a history and physical is as follows:    48-year-old female with a history of fibromyalgia, IBS-D, chronic fatigue syndrome, environmental allergies presenting for evaluation of possible allergic reaction.  Patient states her symptoms started 11 days ago.  Notes a pruritic rash that started on her extremities and has progressed to her back, chest, and neck.  Patient is unsure if is related to white wine that she drank or being around her daughter's dog.  Patient denies new soaps, lotions, detergents, or other exposures.  Takes Singulair and Zyrtec daily and took 1 dose of Benadryl last night.  Has been using a topical hydrocortisone cream.  Denies fever, chills, chest pain, shortness of breath, nausea, vomiting, abdominal pain, upper respiratory symptoms.    Please see resident documentation for history and review of systems.    Exam: Vital signs and nursing notes reviewed  General: Awake, alert, scratching at chest and neck  HEENT: Normocephalic, atraumatic, mucous membranes moist, no posterior pharyngeal swelling or edema, no swelling to the face or lips.  No tongue swelling or elevation  Neck: Supple  Heart: Regular rate and rhythm  Lungs: Clear to auscultation bilaterally without wheezes, rales, or rhonchi.  No stridor  Abdomen:  Soft, tender, nondistended  Extremities: No swelling or deformity  Skin: Warm, dry, intact, erythematous papular rash with coalescence that blanches  Neuro: No gross motor deficits    ED course/medical decision makin-year-old female presenting for evaluation of pruritic rash.  Differential diagnosis includes urticaria, contact dermatitis, atopic dermatitis, allergic reaction.  No evidence of anaphylaxis.  No evidence of airway compromise, facial swelling, oropharyngeal edema.  Lungs are clear to auscultation without wheezing or stridor.  Unclear etiology of the patient's rash.  Denies recent viral symptoms.  Suspect urticaria versus contact dermatitis.  Patient is taking a daily antihistamine, so we will add a burst of steroids to see if this helps to alleviate her symptoms.  Can continue over-the-counter hydrocortisone cream as needed.  Placed an ambulatory referral to dermatology for follow-up.  Return precautions and follow-up discussed.  Patient is in agreement and understanding of these instructions.    Diagnosis: Rash  Disposition: Discharge

## 2024-01-12 NOTE — ED PROVIDER NOTES
History  Chief Complaint   Patient presents with    Allergic Reaction     Pt states worsening hives since NYD. No new medications/foods/detergents. Rash now spreading across chest/into neck. States getting more and more difficult to breathe and feels like her throat/tongue is swollen. Has not taken any benadryl today.      47yo F w/ h/o environmental allergies presenting for allergic reaction. Approximately 11 days ago Pt developed sudden onset pruritic raised rash starting on her thighs and over the time has spread to include her back/chest/arms/neck. The only thing she remembers having around the time of the rash was white wine, but she states she only has had issues with red wine and beer. Additionally, she's been around her daughter's dog a few times in the past few weeks, but has never had an issue being around them before. Notably, she has allergies to dogs, including the ones she owns, however, she has had allergy shots in the past. Is chronically on singulair and zyrtec. Did not find relief with benadryl. Endorses associated chest tightness, tongue swelling. Denies CP, SOB, wheezing, abdominal pain, vomiting, exposure to new foods/soaps/detergents/insects, fever.      History provided by:  Patient      Prior to Admission Medications   Prescriptions Last Dose Informant Patient Reported? Taking?   DULoxetine (CYMBALTA) 60 mg delayed release capsule   No No   Sig: TAKE 2 CAPSULES BY MOUTH DAILY   Sodium Fluoride 5000 PPM 1.1 % PSTE   Yes No   Sig: BRUSH FOR 2 MINUTES AND DO NOT RINSE   acetaminophen (TYLENOL) 325 mg tablet  Self Yes No   Sig: Take 325 mg by mouth every 6 (six) hours as needed for mild pain     buPROPion (WELLBUTRIN XL) 300 mg 24 hr tablet   No No   Sig: Take 1 tablet (300 mg total) by mouth every morning   dicyclomine (BENTYL) 10 mg capsule  Self No No   Sig: TAKE 1 CAPSULE BY MOUTH 3 TIMES DAILY.   gabapentin (NEURONTIN) 300 mg capsule   No No   Sig: TAKE 1 CAPSULE 3 TIMES A DAY FOR 7 DAYS,  THEN 2 CAPSULES 3 TIMES A DAY FOR 23 DAYS.   glyBURIDE (DIABETA) 2.5 mg tablet   No No   Sig: TAKE 1 TABLET BY MOUTH EVERY DAY WITH BREAKFAST   magnesium oxide (MAG-OX) 400 mg   No No   Sig: Take 1 tablet (400 mg total) by mouth daily   montelukast (SINGULAIR) 10 mg tablet   No No   Sig: TAKE 1 TABLET BY MOUTH EVERYDAY AT BEDTIME   olopatadine HCl (PATADAY) 0.2 % opth drops  Self Yes No   Sig: Apply 0.2 % to eye as needed   pantoprazole (PROTONIX) 40 mg tablet   No No   Sig: TAKE 1 TABLET BY MOUTH EVERY DAY   pseudoephedrine (SUDAFED) 30 mg tablet   No No   Sig: Take 1 tablet (30 mg total) by mouth every 4 (four) hours as needed for congestion   ramelteon (ROZEREM) 8 mg tablet   No No   Sig: Take 1 tablet (8 mg total) by mouth daily at bedtime      Facility-Administered Medications: None       Past Medical History:   Diagnosis Date    Arthritis     Diabetes 1.5, managed as type 2 (HCC)     Diabetes mellitus (HCC)     Fibromyalgia     Hiatal hernia     HPV (human papilloma virus) infection     Irritable bowel syndrome     Lactose intolerance     Prediabetes        Past Surgical History:   Procedure Laterality Date    ADENOIDECTOMY      CERVICAL BIOPSY       SECTION      COLONOSCOPY      IR BIOPSY BONE MARROW  2022    OR ESOPHAGOGASTRODUODENOSCOPY TRANSORAL DIAGNOSTIC N/A 2017    Procedure: EGD AND COLONOSCOPY;  Surgeon: Devante Fountain MD;  Location: [facility];  Service: Gastroenterology    OR LAPS SURG CHOLECYSTECTOMY W/CHOLANGIOGRAPHY N/A 2018    Procedure: LAPAROSCOPIC CHOLECYSTECTOMY WITH IOC;  Surgeon: Jewel Bro MD;  Location: [facility];  Service: General    TONSILLECTOMY      TUBAL LIGATION         Family History   Problem Relation Age of Onset    Arthritis Mother     Fibromyalgia Mother     Endometriosis Mother     Other Mother         Eye pressure, gallbladder removal    Hypertension Father     Diabetes Father     Diabetes type II Father     Other Father         Gallbladder  removal     Pancreatic cancer Maternal Uncle     Esophageal cancer Maternal Grandfather     Heart disease Paternal Grandmother     Hyperthyroidism Paternal Grandmother     COPD Paternal Grandmother     Stroke Neg Hx     Thyroid cancer Neg Hx      I have reviewed and agree with the history as documented.    E-Cigarette/Vaping    E-Cigarette Use Never User      E-Cigarette/Vaping Substances    Nicotine No     THC No     CBD No     Flavoring No     Other No     Unknown No      Social History     Tobacco Use    Smoking status: Every Day     Current packs/day: 1.50     Types: Cigarettes    Smokeless tobacco: Never   Vaping Use    Vaping status: Never Used   Substance Use Topics    Alcohol use: Not Currently     Alcohol/week: 1.0 standard drink of alcohol     Types: 1 Glasses of wine per week     Comment: does not currently drink ETOH    Drug use: No        Review of Systems   Constitutional: Negative.    HENT: Negative.     Respiratory:  Positive for chest tightness. Negative for shortness of breath.    Cardiovascular: Negative.    Gastrointestinal: Negative.    Genitourinary: Negative.    Musculoskeletal: Negative.    Skin:  Positive for rash.   Neurological: Negative.    Psychiatric/Behavioral: Negative.         Physical Exam  ED Triage Vitals [01/11/24 1845]   Temperature Pulse Respirations Blood Pressure SpO2   98.3 °F (36.8 °C) 100 18 (!) 163/110 99 %      Temp Source Heart Rate Source Patient Position - Orthostatic VS BP Location FiO2 (%)   Oral Monitor Sitting Right arm --      Pain Score       --             Orthostatic Vital Signs  Vitals:    01/11/24 1845   BP: (!) 163/110   Pulse: 100   Patient Position - Orthostatic VS: Sitting       Physical Exam  Constitutional:       Appearance: Normal appearance.   HENT:      Head: Normocephalic and atraumatic.      Right Ear: External ear normal.      Left Ear: External ear normal.      Nose: Nose normal.      Mouth/Throat:      Mouth: Mucous membranes are moist.       Pharynx: Oropharynx is clear.   Eyes:      Extraocular Movements: Extraocular movements intact.      Conjunctiva/sclera: Conjunctivae normal.   Cardiovascular:      Rate and Rhythm: Normal rate and regular rhythm.      Pulses: Normal pulses.      Heart sounds: Normal heart sounds.   Pulmonary:      Effort: Pulmonary effort is normal.      Breath sounds: Normal breath sounds. No wheezing.   Abdominal:      General: Abdomen is flat.      Palpations: Abdomen is soft.   Skin:     General: Skin is warm and dry.      Capillary Refill: Capillary refill takes less than 2 seconds.      Findings: Rash present. Rash is urticarial (neck/back/chest/legs).   Neurological:      General: No focal deficit present.      Mental Status: She is alert and oriented to person, place, and time.   Psychiatric:         Mood and Affect: Mood normal.         Behavior: Behavior normal.         ED Medications  Medications   diphenhydrAMINE (BENADRYL) tablet 25 mg (25 mg Oral Given 1/11/24 2005)   predniSONE tablet 40 mg (40 mg Oral Given 1/11/24 2033)       Diagnostic Studies  Results Reviewed       None                   No orders to display         Procedures  Procedures      ED Course                                       Medical Decision Making  Pt's blanching non-painful rash is suspicious for contact dermatitis. Will treat with benadryl and short course of PO steroids, and have Pt f/u with dermatology. No evidence of a malignant rash. Strict return precautions discussed.    Risk  OTC drugs.  Prescription drug management.          Disposition  Final diagnoses:   Rash     Time reflects when diagnosis was documented in both MDM as applicable and the Disposition within this note       Time User Action Codes Description Comment    1/11/2024  7:55 PM Richard Wilson [L50.9] Urticaria     1/11/2024  7:56 PM Richard Wilson [L30.9] Dermatitis     1/11/2024  7:56 PM Richard Wilson Modify [L30.9] Dermatitis     1/11/2024  7:56 PM Rcihard Wilson  Remove [L50.9] Urticaria     1/11/2024  7:56 PM Richard Wilson Remove [L30.9] Dermatitis     1/11/2024  7:56 PM Richard Wilson Add [R21] Rash           ED Disposition       ED Disposition   Discharge    Condition   Stable    Date/Time   Thu Jan 11, 2024  7:55 PM    Comment   Chani Elkins discharge to home/self care.                   Follow-up Information       Follow up With Specialties Details Why Contact Info Additional Information    [facility] Emergency Department Emergency Medicine Go to  If symptoms worsen [facility] [address]  [phone] [facility] Emergency Department, [facility] [place], [place], [address]    [facility] Schedule an appointment as soon as possible for a visit  As needed [address] [address] [phone] [facility], [address] [place] [address], [address]     [phone]            Patient's Medications   Discharge Prescriptions    PREDNISONE 20 MG TABLET    Take 2 tablets (40 mg total) by mouth daily for 4 days Do not start before January 12, 2024.       Start Date: 1/12/2024 End Date: 1/16/2024       Order Dose: 40 mg       Quantity: 8 tablet    Refills: 0         PDMP Review       None             ED Provider  Attending physically available and evaluated Chani Elkins. I managed the patient along with the ED Attending.    Electronically Signed by           Richard Wilson MD  01/11/24 2034

## 2024-02-05 ENCOUNTER — HOSPITAL ENCOUNTER (EMERGENCY)
Facility: HOSPITAL | Age: 49
Discharge: HOME | End: 2024-02-05
Attending: EMERGENCY MEDICINE

## 2024-02-05 ENCOUNTER — TELEPHONE (OUTPATIENT)
Age: 49
End: 2024-02-05

## 2024-02-05 VITALS
RESPIRATION RATE: 18 BRPM | TEMPERATURE: 98.3 F | DIASTOLIC BLOOD PRESSURE: 85 MMHG | SYSTOLIC BLOOD PRESSURE: 156 MMHG | HEART RATE: 84 BPM | OXYGEN SATURATION: 100 %

## 2024-02-05 DIAGNOSIS — R03.0 ELEVATED BLOOD PRESSURE READING: ICD-10-CM

## 2024-02-05 DIAGNOSIS — L02.01 FACIAL ABSCESS: Primary | ICD-10-CM

## 2024-02-05 PROCEDURE — 99283 EMERGENCY DEPT VISIT LOW MDM: CPT

## 2024-02-05 PROCEDURE — 99285 EMERGENCY DEPT VISIT HI MDM: CPT | Performed by: EMERGENCY MEDICINE

## 2024-02-05 RX ORDER — SULFAMETHOXAZOLE AND TRIMETHOPRIM 800; 160 MG/1; MG/1
1 TABLET ORAL 2 TIMES DAILY
Qty: 10 TABLET | Refills: 0 | Status: SHIPPED | OUTPATIENT
Start: 2024-02-05 | End: 2024-02-05 | Stop reason: CLARIF

## 2024-02-05 RX ORDER — CLINDAMYCIN HYDROCHLORIDE 150 MG/1
450 CAPSULE ORAL ONCE
Status: COMPLETED | OUTPATIENT
Start: 2024-02-05 | End: 2024-02-05

## 2024-02-05 RX ORDER — CLINDAMYCIN HYDROCHLORIDE 150 MG/1
450 CAPSULE ORAL 3 TIMES DAILY
Qty: 63 CAPSULE | Refills: 0 | Status: SHIPPED | OUTPATIENT
Start: 2024-02-05 | End: 2024-02-12

## 2024-02-05 RX ADMIN — CLINDAMYCIN HYDROCHLORIDE 450 MG: 150 CAPSULE ORAL at 01:23

## 2024-02-05 NOTE — DISCHARGE INSTRUCTIONS
Today you were seen in the emergency department for facial swelling//cyst your workup included physical exam. I believe your symptoms to be the result of An abscess, likely from a blocked hair follicle. At this time there does not appear to be an emergent life threatening cause to explain your symptoms. You are stable for discharge home with outpatient follow up.     We will prescribe you Bactrim to take twice daily for 5 days.  Please also take Tylenol or Motrin as needed to help with pain, you may apply warm compresses to help alleviate with swelling as well.  We will give you a referral to dermatology to follow-up with for further evaluation and consideration for I&D if not better after antibiotics.  Please also follow-up with them regarding your hives/contact dermatitis rash.    Please follow up with your primary care provider in the next 2-3 days. Please review all results discussed today with your primary care provider.  Please also have them recheck your blood pressure as it was elevated when he first arrived to the ED.    Please return to the emergency department as soon as possible if you develop uncontrollable fevers (Temp >100.4), uncontrollable pain, increased size, warmth, swelling, redness or drainage from the cyst/abscess, changes in your vision, pain with movement of your eyes, trouble swallowing, vomiting, chest pain, trouble breathing, or any other concerning symptoms.     Thank you for choosing [facility] for your care.

## 2024-02-05 NOTE — ED PROVIDER NOTES
History  Chief Complaint   Patient presents with    Cyst     Pt states she has a possible cyst above her R eyebrow. Pt states she had something similar happen in 2019 which needed I&D. Pt denies other sx at this time.    Rash     Patient also c/o generalized rash since 1/1/24.      48-year-old female with history of facial abscess, DM 2, depression/anxiety presenting to the ED with complaints of,swelling, pain, and redness located above her right eyebrow that onset 1 hour prior to arrival.  Patient with history of right facial abscess 5 years ago in the same location that required antibiotic treatment with Bactrim and I&D. She denies injury or trauma. Has pain at the site, but no pain with eye movements or behind her eye.  Denies purulent drainage, eye pain, eye redness, photophobia.  Of note patient also reports urticaria and hives ongoing for the past month.  She has not used anything to help alleviate her pain.        Prior to Admission Medications   Prescriptions Last Dose Informant Patient Reported? Taking?   DULoxetine (CYMBALTA) 60 mg delayed release capsule   No No   Sig: TAKE 2 CAPSULES BY MOUTH DAILY   Sodium Fluoride 5000 PPM 1.1 % PSTE   Yes No   Sig: BRUSH FOR 2 MINUTES AND DO NOT RINSE   acetaminophen (TYLENOL) 325 mg tablet  Self Yes No   Sig: Take 325 mg by mouth every 6 (six) hours as needed for mild pain     buPROPion (WELLBUTRIN XL) 300 mg 24 hr tablet   No No   Sig: Take 1 tablet (300 mg total) by mouth every morning   dicyclomine (BENTYL) 10 mg capsule  Self No No   Sig: TAKE 1 CAPSULE BY MOUTH 3 TIMES DAILY.   gabapentin (NEURONTIN) 300 mg capsule   No No   Sig: TAKE 1 CAPSULE 3 TIMES A DAY FOR 7 DAYS, THEN 2 CAPSULES 3 TIMES A DAY FOR 23 DAYS.   glyBURIDE (DIABETA) 2.5 mg tablet   No No   Sig: TAKE 1 TABLET BY MOUTH EVERY DAY WITH BREAKFAST   magnesium oxide (MAG-OX) 400 mg   No No   Sig: Take 1 tablet (400 mg total) by mouth daily   montelukast (SINGULAIR) 10 mg tablet   No No   Sig: TAKE 1  TABLET BY MOUTH EVERYDAY AT BEDTIME   olopatadine HCl (PATADAY) 0.2 % opth drops  Self Yes No   Sig: Apply 0.2 % to eye as needed   pantoprazole (PROTONIX) 40 mg tablet   No No   Sig: TAKE 1 TABLET BY MOUTH EVERY DAY   pseudoephedrine (SUDAFED) 30 mg tablet   No No   Sig: Take 1 tablet (30 mg total) by mouth every 4 (four) hours as needed for congestion   ramelteon (ROZEREM) 8 mg tablet   No No   Sig: Take 1 tablet (8 mg total) by mouth daily at bedtime      Facility-Administered Medications: None       Past Medical History:   Diagnosis Date    Arthritis     Diabetes 1.5, managed as type 2 (HCC)     Diabetes mellitus (HCC)     Fibromyalgia     Hiatal hernia     HPV (human papilloma virus) infection     Irritable bowel syndrome     Lactose intolerance     Prediabetes        Past Surgical History:   Procedure Laterality Date    ADENOIDECTOMY      CERVICAL BIOPSY       SECTION      COLONOSCOPY      IR BIOPSY BONE MARROW  2022    NM ESOPHAGOGASTRODUODENOSCOPY TRANSORAL DIAGNOSTIC N/A 2017    Procedure: EGD AND COLONOSCOPY;  Surgeon: Devante Fountain MD;  Location: [facility];  Service: Gastroenterology    NM LAPS SURG CHOLECYSTECTOMY W/CHOLANGIOGRAPHY N/A 2018    Procedure: LAPAROSCOPIC CHOLECYSTECTOMY WITH IOC;  Surgeon: Jewel Bro MD;  Location: [facility];  Service: General    TONSILLECTOMY      TUBAL LIGATION         Family History   Problem Relation Age of Onset    Arthritis Mother     Fibromyalgia Mother     Endometriosis Mother     Other Mother         Eye pressure, gallbladder removal    Hypertension Father     Diabetes Father     Diabetes type II Father     Other Father         Gallbladder removal     Pancreatic cancer Maternal Uncle     Esophageal cancer Maternal Grandfather     Heart disease Paternal Grandmother     Hyperthyroidism Paternal Grandmother     COPD Paternal Grandmother     Stroke Neg Hx     Thyroid cancer Neg Hx      I have reviewed and agree with the history as  documented.    E-Cigarette/Vaping    E-Cigarette Use Never User      E-Cigarette/Vaping Substances    Nicotine No     THC No     CBD No     Flavoring No     Other No     Unknown No      Social History     Tobacco Use    Smoking status: Every Day     Current packs/day: 1.50     Types: Cigarettes    Smokeless tobacco: Never   Vaping Use    Vaping status: Never Used   Substance Use Topics    Alcohol use: Not Currently     Alcohol/week: 1.0 standard drink of alcohol     Types: 1 Glasses of wine per week     Comment: does not currently drink ETOH    Drug use: No        Review of Systems   Constitutional:  Negative for chills and fever.   HENT:  Negative for congestion and sore throat.    Eyes:  Negative for photophobia, pain, redness and visual disturbance.   Skin:  Positive for color change and rash.   Neurological:  Negative for facial asymmetry and headaches.   All other systems reviewed and are negative.      Physical Exam  ED Triage Vitals [02/05/24 0034]   Temperature Pulse Respirations Blood Pressure SpO2   98.3 °F (36.8 °C) 98 18 (!) 193/105 100 %      Temp Source Heart Rate Source Patient Position - Orthostatic VS BP Location FiO2 (%)   Oral Monitor Lying Right arm --      Pain Score       --             Orthostatic Vital Signs  Vitals:    02/05/24 0034 02/05/24 0110   BP: (!) 193/105 156/85   Pulse: 98 84   Patient Position - Orthostatic VS: Lying Sitting       Physical Exam  Vitals and nursing note reviewed.   Constitutional:       General: She is not in acute distress.     Appearance: She is not toxic-appearing.   HENT:      Head: Atraumatic.      Right Ear: Tympanic membrane, ear canal and external ear normal.      Left Ear: Tympanic membrane, ear canal and external ear normal.      Nose: Nose normal. No congestion.      Mouth/Throat:      Mouth: Mucous membranes are moist.      Pharynx: Oropharynx is clear. No oropharyngeal exudate.   Eyes:      General:         Right eye: No discharge.         Left eye: No  discharge.      Extraocular Movements: Extraocular movements intact.      Right eye: Normal extraocular motion and no nystagmus.      Left eye: Normal extraocular motion and no nystagmus.      Conjunctiva/sclera: Conjunctivae normal.      Pupils: Pupils are equal, round, and reactive to light.      Comments: No pain with extraocular movements.   Cardiovascular:      Rate and Rhythm: Normal rate and regular rhythm.      Pulses: Normal pulses.      Heart sounds: Normal heart sounds.   Pulmonary:      Effort: Pulmonary effort is normal.      Breath sounds: Normal breath sounds.   Abdominal:      General: Abdomen is flat.      Tenderness: There is no abdominal tenderness. There is no guarding or rebound.   Musculoskeletal:         General: Normal range of motion.      Cervical back: Normal range of motion and neck supple. No rigidity or tenderness.   Skin:     General: Skin is warm.      Capillary Refill: Capillary refill takes less than 2 seconds.      Findings: Rash (Baseline urticarial rash noted to bilateral upper extremities and cheeks.  Baseline per patient.) present.      Comments: 4 cm in diameter fluctuant abscess noted over right eyebrow.  Mild overlying erythema and tenderness.  No significant warmth or drainage.   Neurological:      General: No focal deficit present.      Mental Status: She is alert and oriented to person, place, and time. Mental status is at baseline.      GCS: GCS eye subscore is 4. GCS verbal subscore is 5. GCS motor subscore is 6.      Cranial Nerves: Cranial nerves 2-12 are intact. No cranial nerve deficit, dysarthria or facial asymmetry.      Sensory: Sensation is intact. No sensory deficit.      Motor: Motor function is intact.   Psychiatric:         Mood and Affect: Mood normal.         Behavior: Behavior normal.         ED Medications  Medications   clindamycin (CLEOCIN) capsule 450 mg (450 mg Oral Given 2/5/24 0123)       Diagnostic Studies  Results Reviewed       None              "      No orders to display         Procedures  Procedures      ED Course  ED Course as of 02/05/24 0251   Mon Feb 05, 2024   0053 Blood Pressure(!): 193/105   0110 Blood Pressure: 156/85  BP improved, patient was advised to follow-up with her PMD for BP recheck.                             SBIRT 22yo+      Flowsheet Row Most Recent Value   Initial Alcohol Screen: US AUDIT-C     1. How often do you have a drink containing alcohol? 0 Filed at: 02/05/2024 0042   2. How many drinks containing alcohol do you have on a typical day you are drinking?  0 Filed at: 02/05/2024 0042   3a. Male UNDER 65: How often do you have five or more drinks on one occasion? 0 Filed at: 02/05/2024 0042   3b. FEMALE Any Age, or MALE 65+: How often do you have 4 or more drinks on one occassion? 0 Filed at: 02/05/2024 0042   Audit-C Score 0 Filed at: 02/05/2024 0042   RAQUEL: How many times in the past year have you...    Used an illegal drug or used a prescription medication for non-medical reasons? Never Filed at: 02/05/2024 0042                  Medical Decision Making  Patient with history as above presented to triage with CC of \" Patient presents with:  Cyst: Pt states she has a possible cyst above her R eyebrow. Pt states she had something similar happen in 2019 which needed I&D. Pt denies other sx at this time.  Rash: Patient also c/o generalized rash since 1/1/24.    \"    Hx obtained from pt    This patient presents with initial presentation of local erythema, warmth, swelling concerning for cellulitis/abscess. Sensitivity/pain to light touch around the erythematous area. No lymphangitic spread visible and no fluid pockets or fluctuance c/f abscess noted.  Low c/f preseptal or orbital cellulitis.  No immune compromise, bullae, pain out of proportion, or rapid progression c/f necrotizing fasciitis.  Patient with history of I&D in the same location, previously had been on Bactrim in the past with minimal relief.  Shared decision making was " made to forego repeat I&D today, in favor of antibiotic treatment with clindamycin 3 times daily and outpatient dermatology follow-up.  First dose of clinda given in ED.  Reviewed strict return precautions with patient, along with care instructions at home and she is agreeable to plan.    Disposition: No evidence of serious bacterial illness requiring admission for IV antibiotics. Nontoxic appearing, VSS. Low risk for treatment failure based on history. Will discharge home with PO antibiotics and return precautions discussed at bedside.    Patient was nontoxic appearing and stable. Exam as above. Ambulatory and Tolerating PO.     I have independently ordered, reviewed and interpreted the following: labs and/or imaging studies listed above  Reviewed external records including notes, and prior labs/imaging results.    DDX including but not limited to: abscess, cellulitis, folliculitis, carbuncle,  doubt: osteomyelitis, lymphangitis,  rhabdomyolysis, necrotizing fasciitis, allergic reaction, angioedema, DVT.     Consideration was given for admission, but the patient was stable for outpatient management.    Disposition: Discussed need for follow up with their primary doctor or specialist to review all results, including incidental findings as above. Patient discharged with explanation of ED workup and diagnosis, instructions on how to obtain outpatient follow up, care instructions at home, and strict return precautions if patient develops new or worsening symptoms. Patients questions answered and agreeable with discharge plan.     See ED Course for further MDM.      PLEASE NOTE:  This encounter was completed utilizing the [employer] Direct Speech Voice Recognition Software. Grammatical errors, random word insertions, pronoun errors and incomplete sentences are occasional inherent consequences of the system due to software limitations, ambient noise and hardware issues.These may be missed by proof reading prior to  affixing electronic signature. Any questions or concerns about the content, text or information contained within the body of this dictation should be directly addressed to the physician for clarification. Please do not hesitate to call me directly if you have any questions or concerns.      Amount and/or Complexity of Data Reviewed  External Data Reviewed: notes.    Risk  Prescription drug management.          Disposition  Final diagnoses:   Facial abscess   Elevated blood pressure reading     Time reflects when diagnosis was documented in both MDM as applicable and the Disposition within this note       Time User Action Codes Description Comment    2/5/2024 12:58 AM Ahmed, Abhinav Add [L02.91] Abscess     2/5/2024  1:01 AM Ahmed, Abhinav Add [L02.01] Facial abscess     2/5/2024  1:01 AM Ahmed, Abhinav Modify [L02.01] Facial abscess     2/5/2024  1:01 AM Ahmed, Abhinav Remove [L02.91] Abscess     2/5/2024  1:02 AM Ahmed, Abhinav Add [R03.0] Elevated blood pressure reading           ED Disposition       ED Disposition   Discharge    Condition   Stable    Date/Time   Mon Feb 5, 2024 0102    Comment   Chani Elkins discharge to home/self care.                   Follow-up Information       Follow up With Specialties Details Why Contact Info Additional Information    Saranya Anderson, DO Family Medicine  As needed [address] [facility] [address] [phone]       [facility] Emergency Medicine  If symptoms worsen [facility] [address]  [phone] [facility], [address] [place] [address], [id number]            Discharge Medication List as of 2/5/2024  1:16 AM        START taking these medications    Details   clindamycin (CLEOCIN) 150 mg capsule Take 3 capsules (450 mg total) by mouth 3 (three) times a day for 7 days, Starting Mon 2/5/2024, Until Mon 2/12/2024, Normal            CONTINUE these medications which have NOT CHANGED    Details   pantoprazole (PROTONIX) 40 mg tablet TAKE 1 TABLET BY MOUTH EVERY DAY, Normal      acetaminophen (TYLENOL) 325 mg tablet Take 325 mg by mouth every 6 (six) hours as needed for mild pain  , Historical Med      buPROPion (WELLBUTRIN XL) 300 mg 24 hr tablet Take 1 tablet (300 mg total) by mouth every morning, Starting Fri 11/11/2022, Normal      dicyclomine (BENTYL) 10 mg capsule TAKE 1 CAPSULE BY MOUTH 3 TIMES DAILY., Normal      DULoxetine (CYMBALTA) 60 mg delayed release capsule TAKE 2 CAPSULES BY MOUTH DAILY, Normal      gabapentin (NEURONTIN) 300 mg capsule TAKE 1 CAPSULE 3 TIMES A DAY FOR 7 DAYS, THEN 2 CAPSULES 3 TIMES A DAY FOR 23 DAYS., Normal      glyBURIDE (DIABETA) 2.5 mg tablet TAKE 1 TABLET BY MOUTH EVERY DAY WITH BREAKFAST, Normal      magnesium oxide (MAG-OX) 400 mg Take 1 tablet (400 mg total) by mouth daily, Starting Fri 4/8/2022, Normal      montelukast (SINGULAIR) 10 mg tablet TAKE 1 TABLET BY MOUTH EVERYDAY AT BEDTIME, Normal      olopatadine HCl (PATADAY) 0.2 % opth drops Apply 0.2 % to eye as needed, Starting Fri 2/6/2015, Historical Med      pseudoephedrine (SUDAFED) 30 mg tablet Take 1 tablet (30 mg total) by mouth every 4 (four) hours as needed for congestion, Starting Wed 12/14/2022, Normal      ramelteon (ROZEREM) 8 mg tablet Take 1 tablet (8 mg total) by mouth daily at bedtime, Starting Fri 11/11/2022, Normal      Sodium Fluoride 5000 PPM 1.1 % PSTE BRUSH FOR 2 MINUTES AND DO NOT RINSE, Historical Med               PDMP Review       None             ED Provider  Attending physically available and evaluated Chani Elkins. I managed the patient along with the ED Attending.    Electronically Signed by           Abhinav Martin DO  02/05/24 6203

## 2024-02-05 NOTE — ED ATTENDING ATTESTATION
2/5/2024  I, Osman Burr MD, saw and evaluated the patient. I have discussed the patient with the resident/non-physician practitioner and agree with the resident's/non-physician practitioner's findings, Plan of Care, and MDM as documented in the resident's/non-physician practitioner's note, except where noted. All available labs and Radiology studies were reviewed.  I was present for key portions of any procedure(s) performed by the resident/non-physician practitioner and I was immediately available to provide assistance.       At this point I agree with the current assessment done in the Emergency Department.  I have conducted an independent evaluation of this patient a history and physical is as follows:    This is a 48-year-old female with a relevant past medical history of diabetes, hyperlipidemia, presenting to the ED today for complaint of a small area of swelling above her right eyebrow.  Patient apparently has had previous abscess in this location, which needed to be drained twice.  She has not had any systemic symptoms.  She has not had any limitation in extraocular motions, and has not had any pain with them either.  She has not had any blurry vision.  Patient on exam has a small area of approximately 1 cm fluctuance, with some surrounding erythema of approximately 2 cm.  It does not go past her eyebrow.  She does not have any periorbital swelling or erythema.  She does not have any entrapment symptoms.  She does not have any pain with extraocular motions.  And otherwise she does not have any other complaints.  Her differential diagnosis includes: Abscess versus dermoid cyst versus cellulitis versus periorbital cellulitis versus other.  She does not seem to have periorbital cellulitis at this point time based on her exam.  I do not think that she has any deeper or more sinister infection going on.  She does have an area which could potentially be an abscess, however with it being on her face, and  no obvious signs of worsening here from the ED we did offer her I&D however after shared decision making patient would like to go home to follow-up with dermatology as an outpatient.  Patient previously has been on Bactrim, which did not work for her, for that reason we have started her on clindamycin, first dose here and she will receive a prescription.  The management plan was discussed in detail with the patient at bedside and all questions were answered. Strict ED return instructions were discussed at bedside. Prior to discharge, both verbal and written instructions were provided. We discussed the signs and symptoms that should prompt the patient to return to the ED. All questions were answered and the patient was comfortable with the plan of care and discharged home. The patient agrees to return to the Emergency Department for concerns and/or progression of illness.    Patient also had elevated blood pressure while here, however it did trend down, I do think that part of this is due to stress, as well as the fact that she was here in the ED.  Her blood pressure on discharge was 156/85.  She will still speak with her PCP about her blood pressure management.  She is currently on steroids, which also can elevate her blood pressure.    ED Course         Critical Care Time  Procedures

## 2024-02-08 ENCOUNTER — OFFICE VISIT (OUTPATIENT)
Dept: DERMATOLOGY | Facility: CLINIC | Age: 49
End: 2024-02-08

## 2024-02-08 VITALS — TEMPERATURE: 96.9 F | BODY MASS INDEX: 40.94 KG/M2 | WEIGHT: 246 LBS

## 2024-02-08 DIAGNOSIS — L50.9 URTICARIA: Primary | ICD-10-CM

## 2024-02-08 NOTE — PROGRESS NOTES
"[facility] Dermatology Clinic Note     Patient Name: Chani Elkins  Encounter Date: 2/8/24     Have you been cared for by a [facility] Dermatologist in the last 3 years and, if so, which description applies to you?    NO.   I am considered a \"new\" patient and must complete all patient intake questions. I am FEMALE/of child-bearing potential.    REVIEW OF SYSTEMS:  Have you recently had or currently have any of the following? Recent fever or chills? no  Any non-healing wound? No  Are you pregnant or planning to become pregnant? No  Are you currently or planning to be nursing or breast feeding? No   PAST MEDICAL HISTORY:  Have you personally ever had or currently have any of the following?  If \"YES,\" then please provide more detail. Skin cancer (such as Melanoma, Basal Cell Carcinoma, Squamous Cell Carcinoma?  No  Tuberculosis, HIV/AIDS, Hepatitis B or C: No  Radiation Treatment No   HISTORY OF IMMUNOSUPPRESSION:   Do you have a history of any of the following:  Systemic Immunosuppression such as Diabetes, Biologic or Immunotherapy, Chemotherapy, Organ Transplantation, Bone Marrow Transplantation?  YES,   diabetes  Answering \"YES\" requires the addition of the dotphrase \"IMMUNOSUPPRESSED\" as the first diagnosis of the patient's visit.   FAMILY HISTORY:  Any \"first degree relatives\" (parent, brother, sister, or child) with the following?    Skin Cancer, Pancreatic or Other Cancer? No   PATIENT EXPERIENCE:    Do you want the Dermatologist to perform a COMPLETE skin exam today including a clinical examination under the \"bra and underwear\" areas?  NO  If necessary, do we have your permission to call and leave a detailed message on your Preferred Phone number that includes your specific medical information?  Yes      Allergies   Allergen Reactions    Nuts - Food Allergy Facial Swelling and Swelling     Other reaction(s): swollen tongue    Other Hives, Itching and Swelling     Sneezing, itchy eyes  Other " reaction(s): swollen tongue  Pt is allergic to dogs  Beer    Desogestrel-Ethinyl Estradiol Hives    Pineapple - Food Allergy Tongue Swelling    Dog Epithelium Allergy Skin Test Itching     Other reaction(s): sneezing, itchy eyes    Dust Mite Extract Itching    Ortho Tri-Cyclen (28) [Norgestimate-Eth Estradiol] Other (See Comments)     Burning      Current Outpatient Medications:     acetaminophen (TYLENOL) 325 mg tablet, Take 325 mg by mouth every 6 (six) hours as needed for mild pain  , Disp: , Rfl:     dicyclomine (BENTYL) 10 mg capsule, TAKE 1 CAPSULE BY MOUTH 3 TIMES DAILY., Disp: 90 capsule, Rfl: 8    DULoxetine (CYMBALTA) 60 mg delayed release capsule, TAKE 2 CAPSULES BY MOUTH DAILY, Disp: 60 capsule, Rfl: 5    gabapentin (NEURONTIN) 300 mg capsule, TAKE 1 CAPSULE 3 TIMES A DAY FOR 7 DAYS, THEN 2 CAPSULES 3 TIMES A DAY FOR 23 DAYS., Disp: 159 capsule, Rfl: 0    glyBURIDE (DIABETA) 2.5 mg tablet, TAKE 1 TABLET BY MOUTH EVERY DAY WITH BREAKFAST, Disp: 30 tablet, Rfl: 1    magnesium oxide (MAG-OX) 400 mg, Take 1 tablet (400 mg total) by mouth daily, Disp: 30 tablet, Rfl: 0    montelukast (SINGULAIR) 10 mg tablet, TAKE 1 TABLET BY MOUTH EVERYDAY AT BEDTIME, Disp: 30 tablet, Rfl: 3    olopatadine HCl (PATADAY) 0.2 % opth drops, Apply 0.2 % to eye as needed, Disp: , Rfl:     pantoprazole (PROTONIX) 40 mg tablet, TAKE 1 TABLET BY MOUTH EVERY DAY, Disp: 30 tablet, Rfl: 11    pseudoephedrine (SUDAFED) 30 mg tablet, Take 1 tablet (30 mg total) by mouth every 4 (four) hours as needed for congestion, Disp: 30 tablet, Rfl: 0    Sodium Fluoride 5000 PPM 1.1 % PSTE, BRUSH FOR 2 MINUTES AND DO NOT RINSE, Disp: , Rfl:     buPROPion (WELLBUTRIN XL) 300 mg 24 hr tablet, Take 1 tablet (300 mg total) by mouth every morning (Patient not taking: Reported on 2/8/2024), Disp: 30 tablet, Rfl: 3    clindamycin (CLEOCIN) 150 mg capsule, Take 3 capsules (450 mg total) by mouth 3 (three) times a day for 7 days (Patient not taking: Reported  "on 2/8/2024), Disp: 63 capsule, Rfl: 0    ramelteon (ROZEREM) 8 mg tablet, Take 1 tablet (8 mg total) by mouth daily at bedtime (Patient not taking: Reported on 2/8/2024), Disp: 30 tablet, Rfl: 3          Whom besides the patient is providing clinical information about today's encounter?   NO ADDITIONAL HISTORIAN (patient alone provided history)    Physical Exam and Assessment/Plan by Diagnosis:    URTICARIA (\"CHRONIC\") - just became 6 weeks of hive presence.    Physical Exam:  Anatomic Location Affected:  trunk, extremities  Morphological Description:  clear today  Pertinent Positives:  Pertinent Negatives:    Additional History of Present Condition:  Symptoms started in January with diffuse hives every day since until today after starting daily Zyrtec as well as Benadryl qhs. She denies preceding illness, vaccines, or medication changes. Does have a history of allergies previously followed by an allergist but not currently.    Assessment and Plan: Acute verging on chronic urticaria. Patient is self pay, so we deferred on labs to see if symptoms resolve with below plan. If they do not, will need chronic urticaria evaluation. It is promising that she is clear with only a single Zyrtec but we reviewed that urticaria often requires high dose antihistamines for adequate clearance.  Based on a thorough discussion of this condition and the management approach to it (including a comprehensive discussion of the known risks, side effects and potential benefits of treatment), the patient (family) agrees to implement the following specific plan:  Continue with Zyrtec once in am and start taking Allegra once at night  If does not work take 2 zyrtec in am and one Allegra at night if does not work can safely to increase to 2 Zyrtec in am and 2 Allegra at night  Will hold off on blood work for now to help limit extensive out of pocket cost.   Discussed re-establishing care with allergist in the future if no improvement.   Please " send Dr. Shah an update via my chart in two weeks and we will determine next steps based on how you are doing and your blood work results.     What is urticaria?  Urticaria is characterised by weals (hives) or angioedema (swellings, in 10%) or both (in 40%). There are several types of urticaria. The name urticaria is derived from the common European stinging nettle 'Urtica dioica'.    A weal (or wheal) is a superficial skin-coloured or pale skin swelling, usually surrounded by erythema (redness) that lasts anything from a few minutes to 24 hours. Usually very itchy, it may have a burning sensation.    Angioedema is deeper swelling within the skin or mucous membranes and can be skin-coloured or red. It resolves within 72 hours. Angioedema may be itchy or painful but is often asymptomatic.    What is acute urticaria?  Acute urticaria is urticaria, with or without angioedema, that is present for less than 6 weeks. It is often gone within hours to days.    Who gets acute urticaria?  One in five children or adults has an episode of acute urticaria during their lifetime. It is more common in atopic individuals. It affects all races and both sexes.    What are the clinical features of acute urticaria?  Urticarial weals can be a few millimeters or several centimeters in diameter, colored white or red, with or without a red flare. Each weal may last a few minutes or several hours and may change shape. Weals may be round, or form rings, a map-like pattern, targetoid lesions, or giant patches.    Acute urticaria can affect any site of the body and tends to be distributed widely. Angioedema is more often localised. It commonly affects the face (especially eyelids and perioral sites), hands, feet and genitalia. It may involve tongue, uvula, soft palate, larynx.    Serum sickness due to blood transfusion and serum sickness-like reactions due to certain drugs cause acute urticaria leaving bruises, fever, swollen lymph glands,  joint pain and swelling.    What causes acute urticaria?  Weals are due to release of chemical mediators from tissue mast cells and circulating basophils. These chemical mediators include histamine, platelet-activating factor and cytokines. The mediators activate sensory nerves and cause dilation of blood vessels and leakage of fluid into surrounding tissues. Bradykinin release causes angioedema.    Several hypotheses have been proposed to explain urticaria. The immune, arachidonic acid and coagulation systems are involved, and genetic mutations are under investigation.  Serum sickness and serum sickness-like reactions are due to immune complex deposition in affected tissues.    Acute urticaria can be induced by the following factors but the cause is not always identified.  Acute viral infection -- an upper respiratory infection, viral hepatitis, infectious mononucleosis, mycoplasma   Acute bacterial infection -- a dental abscess, sinusitis   Food allergy (IgE mediated) -- usually milk, egg, peanut, shellfish   Drug allergy (IgE mediated) -- often an antibiotic   Drug pseudoallergy -- aspirin, nonselective nonsteroidal anti-inflammatory drugs, opiates, radiocontrast media; these cause urticaria without immune activation   Vaccination   Bee or wasp stings     Widespread reaction following localized contact urticaria -- rubber latex  Severe allergic urticaria may lead to anaphylactic shock (bronchospasm, collapse).  A single episode or recurrent episodes of angioedema without urticaria can be due to an angiotensin-converting enzyme (ACE) inhibitor drug.    How is acute urticaria diagnosed?  Acute urticaria is diagnosed in people with a short history of weals that last less than 24 hours, with or without angioedema. A thorough physical examination should be undertaken to look for underlying causes.    Skin prick tests and radioallergosorbent tests (RAST) or CAP fluoroimmunoassay may be requested if a drug or food  allergy is suspected in acute urticaria.    Biopsy of urticaria can be non-specific and difficult to interpret. The pathology shows oedema in the dermis and dilated blood vessels, with a variable mixed inflammatory infiltrate. Vessel-wall damage indicates urticarial vasculitis.    What is the treatment for acute urticaria?  The main treatment for acute urticaria in adults and in children is with an oral second-generation antihistamine chosen from the list below. If the standard dose (eg 10 mg for cetirizine) is not effective, the dose can be increased fourfold (eg 40 mg cetirizine daily). They are best taken continuously rather than on demand. They are stopped when the acute urticaria has settled down. There is not thought to be any benefit from adding a second antihistamine.  Cetirizine   Loratadine   Fexofenadine   Desloratadine   Levocetirizine   Rupatadine   Bilastine  Terfenadine and astemizole should not be used as they are cardiotoxic in combination with ketoconazole or erythromycin. They are no longer available in [facility].    Although systemic treatment is best avoided during pregnancy and breastfeeding, there have been no reports that second-generation antihistamines cause birth defects. If treatment is required, loratadine and cetirizine are currently preferred.  Conventional first-generation antihistamines such as promethazine or chlorpheniramine are no longer recommended for urticaria.    Avoidance of trigger factors  In addition to antihistamines, the cause of urticaria should be eliminated if known (eg drug or food allergy). Avoidance of relevant type 1 (IgE-mediated) allergens clears urticaria within 48 hours.  In addition to antihistamines, the triggers for urticaria should be avoided where possible. For example:  Avoid aspirin, opiates and nonsteroidal anti-inflammatory drugs (paracetamol is generally safe).   Avoid known allergies that have been confirmed by positive specific IgE/skin prick  tests if these have clinical relevance for urticaria.   Cool the affected area with a fan, cold flannel, ice pack or soothing moisturising lotion.    Treatment of refractory acute urticaria  If non-sedating antihistamines are not effective, a 4 to 5-day course of oral prednisone (prednisolone) may be warranted in severe acute urticaria, particularly if there is angioedema. Systemic steroids do not speed up the resolution of symptoms.  Intramuscular injection of adrenaline (epinephrine) is reserved for life-threatening anaphylaxis or swelling of the throat.     EPIDERMAL INCLUSION CYST    Physical Exam:  Anatomic Location Affected:  right forehead  Morphological Description:  clear today, no subcutaneous lesion present.   Pertinent Positives:  Pertinent Negatives:    Additional History of Present Condition:  Lesion has resolved since making appointment.      Assessment and Plan:  Based on a thorough discussion of this condition and the management approach to it (including a comprehensive discussion of the known risks, side effects and potential benefits of treatment), the patient (family) agrees to implement the following specific plan:  If area recurs please call office and we can consider evaluation/removal if the lesion is present.     What are epidermal inclusion cysts?  Epidermal inclusion cysts are the most common, benign cutaneous cysts. There are many different names for epidermal inclusion cysts, including epidermoid cyst, epidermal cyst, infundibular cyst, inclusion cyst, and keratin cyst. These cysts can occur anywhere on the body and typically present as nodules directly underneath the skin. There is often a visible pore or opening in the center. The cysts are freely moveable and can range from a few millimeters to several centimeters in diameter. The center of epidermoid cysts almost always contains keratin, which has a cheesy appearance, and not sebum. They also do not originate from sebaceous glands.  Therefore, epidermal inclusion cysts are not the same as sebaceous cysts.    Cysts may remain stable or progressively enlarge over time. There are no reliable predictive factors to tell if an epidermal inclusion cyst will enlarge, become inflamed, or remain quiescent. Infected cysts tend to become larger, turn red, and are more noticeable to the patient. There may be accompanying pain and discomfort.     What causes epidermal inclusion cysts?  Epidermal inclusion cysts often appear out of the blue and are not contagious. They are due to a proliferation of epidermal cells within the dermis and are more common in men than women. They occur more frequently in patients in their 20s to 40s. Epidermal inclusion cysts by themselves are usually not inherited, but they can be hereditary in rare syndromes such as Nichols syndrome, nodular elastosis with cysts and comedones (Favre-Racouchot syndrome), and basal cell nevus syndrome (Gorlin syndrome). Elderly patients with chronic sun-damaged skin areas have a higher likelihood of developing epidermoid cysts. They often occur in areas where hair follicles have been inflamed or repeatedly irritated are more frequent in patients with acne vulgaris. In the  period, they are called milia.     Patients on BRAF inhibitors such as imiquimod and cyclosporine have a higher incidence of epidermoid cysts of the face.    How do we diagnose an epidermal inclusion cyst?  Epidermoid inclusion cysts are often diagnosed by history and physical exam. There is usually no need for biopsy prior to removal.  Radiographic and laboratory exams, such as ultrasound studies, are unnecessary and not typically ordered unless the practitioner suspects a genetic condition.    What is the treatment for an epidermal inclusion cyst?  Inflamed, uninfected epidermal inclusion cysts rarely resolve spontaneously without therapy or surgical intervention. Treatment is not emergent unless desired by the  patient.     Definitive treatment is via surgical excision with walls intact. This method will prevent recurrence. This is best done when the cyst is not inflamed, to decrease the probability of rupture during surgery.   A local anesthetic will be injected around the cyst  A small incision is made in the skin overlying the cyst, and contents are expressed  The incision is repaired with sutures    Another option is to use a 4mm punch biopsy with cyst extraction through the defect.    Incision and drainage is often needed if the cyst is infected or inflamed. If there is surrounding cellulitis, oral antibiotic therapy may be necessary. The common agents used target methicillin sensitive Staphylococcal aureus and methicillin resistant S aureus in areas of high prevalence.       Scribe Attestation      I,:  Kerry Cruz am acting as a scribe while in the presence of the attending physician.:       I,:  Lynnette Shah MD personally performed the services described in this documentation    as scribed in my presence.:

## 2024-02-08 NOTE — PATIENT INSTRUCTIONS
"URTICARIA (\"CHRONIC\") - just became 6 weeks of hive presence.    Assessment and Plan:  Based on a thorough discussion of this condition and the management approach to it (including a comprehensive discussion of the known risks, side effects and potential benefits of treatment), the patient (family) agrees to implement the following specific plan:  Continue with Zyrtec once in am and start taking Allegra once at night  If does not work take 2 zyrtec in am and one Allegra at night if does not work can safely to increase to 2 Zyrtec in am and 2 Allegra at night  Will hold off on blood work for now to help limit extensive out of pocket cost.   Discussed re-establishing care with allergist in the future if no improvement.   Please send Dr. Shah an update via my chart in two weeks and we will determine next steps based on how you are doing and your blood work results.     What is urticaria?  Urticaria is characterised by weals (hives) or angioedema (swellings, in 10%) or both (in 40%). There are several types of urticaria. The name urticaria is derived from the common European stinging nettle 'Urtica dioica'.    A weal (or wheal) is a superficial skin-coloured or pale skin swelling, usually surrounded by erythema (redness) that lasts anything from a few minutes to 24 hours. Usually very itchy, it may have a burning sensation.    Angioedema is deeper swelling within the skin or mucous membranes and can be skin-coloured or red. It resolves within 72 hours. Angioedema may be itchy or painful but is often asymptomatic.    What is acute urticaria?  Acute urticaria is urticaria, with or without angioedema, that is present for less than 6 weeks. It is often gone within hours to days.    Who gets acute urticaria?  One in five children or adults has an episode of acute urticaria during their lifetime. It is more common in atopic individuals. It affects all races and both sexes.    What are the clinical features of acute " urticaria?  Urticarial weals can be a few millimeters or several centimeters in diameter, colored white or red, with or without a red flare. Each weal may last a few minutes or several hours and may change shape. Weals may be round, or form rings, a map-like pattern, targetoid lesions, or giant patches.    Acute urticaria can affect any site of the body and tends to be distributed widely. Angioedema is more often localised. It commonly affects the face (especially eyelids and perioral sites), hands, feet and genitalia. It may involve tongue, uvula, soft palate, larynx.    Serum sickness due to blood transfusion and serum sickness-like reactions due to certain drugs cause acute urticaria leaving bruises, fever, swollen lymph glands, joint pain and swelling.    What causes acute urticaria?  Weals are due to release of chemical mediators from tissue mast cells and circulating basophils. These chemical mediators include histamine, platelet-activating factor and cytokines. The mediators activate sensory nerves and cause dilation of blood vessels and leakage of fluid into surrounding tissues. Bradykinin release causes angioedema.    Several hypotheses have been proposed to explain urticaria. The immune, arachidonic acid and coagulation systems are involved, and genetic mutations are under investigation.  Serum sickness and serum sickness-like reactions are due to immune complex deposition in affected tissues.    Acute urticaria can be induced by the following factors but the cause is not always identified.  Acute viral infection -- an upper respiratory infection, viral hepatitis, infectious mononucleosis, mycoplasma   Acute bacterial infection -- a dental abscess, sinusitis   Food allergy (IgE mediated) -- usually milk, egg, peanut, shellfish   Drug allergy (IgE mediated) -- often an antibiotic   Drug pseudoallergy -- aspirin, nonselective nonsteroidal anti-inflammatory drugs, opiates, radiocontrast media; these cause  urticaria without immune activation   Vaccination   Bee or wasp stings     Widespread reaction following localized contact urticaria -- rubber latex  Severe allergic urticaria may lead to anaphylactic shock (bronchospasm, collapse).  A single episode or recurrent episodes of angioedema without urticaria can be due to an angiotensin-converting enzyme (ACE) inhibitor drug.    How is acute urticaria diagnosed?  Acute urticaria is diagnosed in people with a short history of weals that last less than 24 hours, with or without angioedema. A thorough physical examination should be undertaken to look for underlying causes.    Skin prick tests and radioallergosorbent tests (RAST) or CAP fluoroimmunoassay may be requested if a drug or food allergy is suspected in acute urticaria.    Biopsy of urticaria can be non-specific and difficult to interpret. The pathology shows oedema in the dermis and dilated blood vessels, with a variable mixed inflammatory infiltrate. Vessel-wall damage indicates urticarial vasculitis.    What is the treatment for acute urticaria?  The main treatment for acute urticaria in adults and in children is with an oral second-generation antihistamine chosen from the list below. If the standard dose (eg 10 mg for cetirizine) is not effective, the dose can be increased fourfold (eg 40 mg cetirizine daily). They are best taken continuously rather than on demand. They are stopped when the acute urticaria has settled down. There is not thought to be any benefit from adding a second antihistamine.  Cetirizine   Loratadine   Fexofenadine   Desloratadine   Levocetirizine   Rupatadine   Bilastine  Terfenadine and astemizole should not be used as they are cardiotoxic in combination with ketoconazole or erythromycin. They are no longer available in [place].    Although systemic treatment is best avoided during pregnancy and breastfeeding, there have been no reports that second-generation antihistamines cause  birth defects. If treatment is required, loratadine and cetirizine are currently preferred.  Conventional first-generation antihistamines such as promethazine or chlorpheniramine are no longer recommended for urticaria.    Avoidance of trigger factors  In addition to antihistamines, the cause of urticaria should be eliminated if known (eg drug or food allergy). Avoidance of relevant type 1 (IgE-mediated) allergens clears urticaria within 48 hours.  In addition to antihistamines, the triggers for urticaria should be avoided where possible. For example:  Avoid aspirin, opiates and nonsteroidal anti-inflammatory drugs (paracetamol is generally safe).   Avoid known allergies that have been confirmed by positive specific IgE/skin prick tests if these have clinical relevance for urticaria.   Cool the affected area with a fan, cold flannel, ice pack or soothing moisturising lotion.    Treatment of refractory acute urticaria  If non-sedating antihistamines are not effective, a 4 to 5-day course of oral prednisone (prednisolone) may be warranted in severe acute urticaria, particularly if there is angioedema. Systemic steroids do not speed up the resolution of symptoms.  Intramuscular injection of adrenaline (epinephrine) is reserved for life-threatening anaphylaxis or swelling of the throat.     EPIDERMAL INCLUSION CYST    Assessment and Plan:  Based on a thorough discussion of this condition and the management approach to it (including a comprehensive discussion of the known risks, side effects and potential benefits of treatment), the patient (family) agrees to implement the following specific plan:  If area recurs please call office and we can consider removal if the lesion is present.     What are epidermal inclusion cysts?  Epidermal inclusion cysts are the most common, benign cutaneous cysts. There are many different names for epidermal inclusion cysts, including epidermoid cyst, epidermal cyst, infundibular cyst,  inclusion cyst, and keratin cyst. These cysts can occur anywhere on the body and typically present as nodules directly underneath the skin. There is often a visible pore or opening in the center. The cysts are freely moveable and can range from a few millimeters to several centimeters in diameter. The center of epidermoid cysts almost always contains keratin, which has a cheesy appearance, and not sebum. They also do not originate from sebaceous glands. Therefore, epidermal inclusion cysts are not the same as sebaceous cysts.    Cysts may remain stable or progressively enlarge over time. There are no reliable predictive factors to tell if an epidermal inclusion cyst will enlarge, become inflamed, or remain quiescent. Infected cysts tend to become larger, turn red, and are more noticeable to the patient. There may be accompanying pain and discomfort.     What causes epidermal inclusion cysts?  Epidermal inclusion cysts often appear out of the blue and are not contagious. They are due to a proliferation of epidermal cells within the dermis and are more common in men than women. They occur more frequently in patients in their 20s to 40s. Epidermal inclusion cysts by themselves are usually not inherited, but they can be hereditary in rare syndromes such as Nichols syndrome, nodular elastosis with cysts and comedones (Favre-Racouchot syndrome), and basal cell nevus syndrome (Gorlin syndrome). Elderly patients with chronic sun-damaged skin areas have a higher likelihood of developing epidermoid cysts. They often occur in areas where hair follicles have been inflamed or repeatedly irritated are more frequent in patients with acne vulgaris. In the  period, they are called milia.     Patients on BRAF inhibitors such as imiquimod and cyclosporine have a higher incidence of epidermoid cysts of the face.    How do we diagnose an epidermal inclusion cyst?  Epidermoid inclusion cysts are often diagnosed by history and  physical exam. There is usually no need for biopsy prior to removal.  Radiographic and laboratory exams, such as ultrasound studies, are unnecessary and not typically ordered unless the practitioner suspects a genetic condition.    What is the treatment for an epidermal inclusion cyst?  Inflamed, uninfected epidermal inclusion cysts rarely resolve spontaneously without therapy or surgical intervention. Treatment is not emergent unless desired by the patient.     Definitive treatment is via surgical excision with walls intact. This method will prevent recurrence. This is best done when the cyst is not inflamed, to decrease the probability of rupture during surgery.   A local anesthetic will be injected around the cyst  A small incision is made in the skin overlying the cyst, and contents are expressed  The incision is repaired with sutures    Another option is to use a 4mm punch biopsy with cyst extraction through the defect.    Incision and drainage is often needed if the cyst is infected or inflamed. If there is surrounding cellulitis, oral antibiotic therapy may be necessary. The common agents used target methicillin sensitive Staphylococcal aureus and methicillin resistant S aureus in areas of high prevalence.

## 2024-03-05 ENCOUNTER — OFFICE VISIT (OUTPATIENT)
Age: 49
End: 2024-03-05

## 2024-03-05 VITALS
WEIGHT: 252.2 LBS | RESPIRATION RATE: 18 BRPM | SYSTOLIC BLOOD PRESSURE: 116 MMHG | HEART RATE: 81 BPM | TEMPERATURE: 97.5 F | BODY MASS INDEX: 41.97 KG/M2 | DIASTOLIC BLOOD PRESSURE: 82 MMHG | OXYGEN SATURATION: 97 %

## 2024-03-05 DIAGNOSIS — K58.0 IRRITABLE BOWEL SYNDROME WITH DIARRHEA: ICD-10-CM

## 2024-03-05 DIAGNOSIS — D50.0 IRON DEFICIENCY ANEMIA DUE TO CHRONIC BLOOD LOSS: ICD-10-CM

## 2024-03-05 DIAGNOSIS — M79.7 FIBROMYALGIA: ICD-10-CM

## 2024-03-05 DIAGNOSIS — Z00.00 ANNUAL PHYSICAL EXAM: Primary | ICD-10-CM

## 2024-03-05 DIAGNOSIS — E11.9 TYPE 2 DIABETES MELLITUS WITHOUT COMPLICATION, WITHOUT LONG-TERM CURRENT USE OF INSULIN (HCC): ICD-10-CM

## 2024-03-05 DIAGNOSIS — T78.40XA ALLERGY, INITIAL ENCOUNTER: ICD-10-CM

## 2024-03-05 DIAGNOSIS — F33.1 MODERATE EPISODE OF RECURRENT MAJOR DEPRESSIVE DISORDER (HCC): ICD-10-CM

## 2024-03-05 DIAGNOSIS — Z12.31 BREAST CANCER SCREENING BY MAMMOGRAM: ICD-10-CM

## 2024-03-05 PROBLEM — E53.8 B12 DEFICIENCY: Status: RESOLVED | Noted: 2022-12-09 | Resolved: 2024-03-05

## 2024-03-05 PROCEDURE — 99214 OFFICE O/P EST MOD 30 MIN: CPT | Performed by: FAMILY MEDICINE

## 2024-03-05 RX ORDER — GABAPENTIN 300 MG/1
600 CAPSULE ORAL
Qty: 120 CAPSULE | Refills: 0 | Status: SHIPPED | OUTPATIENT
Start: 2024-03-05 | End: 2024-03-26

## 2024-03-05 RX ORDER — MONTELUKAST SODIUM 10 MG/1
10 TABLET ORAL
Qty: 90 TABLET | Refills: 2 | Status: SHIPPED | OUTPATIENT
Start: 2024-03-05

## 2024-03-05 NOTE — PROGRESS NOTES
ADULT ANNUAL PHYSICAL  [facility] [place]    NAME: Chani Elkins  AGE: 48 y.o. SEX: female  : 1975     DATE: 3/5/2024     Assessment and Plan:     Problem List Items Addressed This Visit       Current moderate episode of major depressive disorder (HCC)- stable on SSRI    Irritable bowel syndrome- persistent. Uses prn bentyl    Fibromyalgia- controlled with gabapentin use     Type 2 diabetes mellitus without complication (HCC)- controlled. A1c 6.7 on glyburide    Iron deficiency anemia, unspecified- hx of iorn infusion. Followed by heme onc     Relevant Orders    CBC and Platelet    Ferritin     Other Visit Diagnoses       Annual physical exam    -  Primary    Relevant Orders    Albumin / creatinine urine ratio    Basic metabolic panel    Hemoglobin A1C    Allergy, initial encounter        Relevant Medications    montelukast (SINGULAIR) 10 mg tablet    Breast cancer screening by mammogram        Relevant Orders    Mammo screening bilateral w 3d & cad              Immunizations and preventive care screenings were discussed with patient today. Appropriate education was printed on patient's after visit summary.    Counseling:  Exercise: the importance of regular exercise/physical activity was discussed. Recommend exercise 3-5 times per week for at least 30 minutes.          Return in about 1 year (around 3/5/2025) for Annual physical.     Chief Complaint:     Chief Complaint   Patient presents with    Annual Exam     Pt is here for her physical      History of Present Illness:     Adult Annual Physical   Patient here for a comprehensive physical exam.   Diet and Physical Activity  Diet/Nutrition: well balanced diet.   Exercise: no formal exercise.      Depression Screening  PHQ-2/9 Depression Screening    Little interest or pleasure in doing things: 0 - not at all  Feeling down, depressed, or hopeless: 0 - not at all  Trouble falling or staying  asleep, or sleeping too much: 0 - not at all  Feeling tired or having little energy: 0 - not at all  Poor appetite or overeatin - not at all  Feeling bad about yourself - or that you are a failure or have let yourself or your family down: 0 - not at all  Trouble concentrating on things, such as reading the newspaper or watching television: 0 - not at all  Moving or speaking so slowly that other people could have noticed. Or the opposite - being so fidgety or restless that you have been moving around a lot more than usual: 0 - not at all  Thoughts that you would be better off dead, or of hurting yourself in some way: 0 - not at all  PHQ-9 Score: 0  PHQ-9 Interpretation: No or Minimal depression       General Health  Sleep: gets 4-6 hours of sleep on average.   Hearing: normal - bilateral.  Vision: wears glasses.   Dental: regular dental visits.       /GYN Health  Follows with gynecology? yes   Patient is: perimenopausal  Last mammo- ordered   Last pap -         Review of Systems:     Review of Systems   Constitutional:  Negative for fever.   Respiratory:  Negative for cough and shortness of breath.    Cardiovascular:  Negative for chest pain.   Gastrointestinal:  Positive for diarrhea. Negative for constipation.   Neurological:  Positive for headaches.   Psychiatric/Behavioral:  Positive for sleep disturbance.       Past Medical History:     Past Medical History:   Diagnosis Date    Arthritis     Diabetes 1.5, managed as type 2 (HCC)     Diabetes mellitus (HCC)     Fibromyalgia     Hiatal hernia     HPV (human papilloma virus) infection     Irritable bowel syndrome     Lactose intolerance     Prediabetes       Past Surgical History:     Past Surgical History:   Procedure Laterality Date    ADENOIDECTOMY      CERVICAL BIOPSY       SECTION      COLONOSCOPY      IR BIOPSY BONE MARROW  2022    TX ESOPHAGOGASTRODUODENOSCOPY TRANSORAL DIAGNOSTIC N/A 2017    Procedure: EGD AND COLONOSCOPY;   Surgeon: Devante Fountain MD;  Location: [facility];  Service: Gastroenterology    [place] LAPS SURG CHOLECYSTECTOMY W/CHOLANGIOGRAPHY N/A 2/9/2018    Procedure: LAPAROSCOPIC CHOLECYSTECTOMY WITH IOC;  Surgeon: Jewel Bro MD;  Location: [facility];  Service: General    TONSILLECTOMY      TUBAL LIGATION        Social History:     Social History     Socioeconomic History    Marital status: Legally      Spouse name: None    Number of children: None    Years of education: None    Highest education level: None   Occupational History    None   Tobacco Use    Smoking status: Every Day     Current packs/day: 1.50     Types: Cigarettes    Smokeless tobacco: Never   Vaping Use    Vaping status: Never Used   Substance and Sexual Activity    Alcohol use: Not Currently     Alcohol/week: 1.0 standard drink of alcohol     Types: 1 Glasses of wine per week     Comment: does not currently drink ETOH    Drug use: No    Sexual activity: Not Currently     Partners: Male     Birth control/protection: Female Sterilization     Comment: has not been sexually active since December   Other Topics Concern    None   Social History Narrative    None     Social Determinants of Health     Financial Resource Strain: Not on file   Food Insecurity: Not on file   Transportation Needs: Not on file   Physical Activity: Inactive (12/29/2020)    Exercise Vital Sign     Days of Exercise per Week: 0 days     Minutes of Exercise per Session: 0 min   Stress: Stress Concern Present (12/29/2020)    [employer] [place] [employer] - Occupational Stress Questionnaire     Feeling of Stress : Very much   Social Connections: Not on file   Intimate Partner Violence: Not on file   Housing Stability: Not on file      Family History:     Family History   Problem Relation Age of Onset    Arthritis Mother     Fibromyalgia Mother     Endometriosis Mother     Other Mother         Eye pressure, gallbladder removal    Hypertension Father     Diabetes  Father     Diabetes type II Father     Other Father         Gallbladder removal     Pancreatic cancer Maternal Uncle     Esophageal cancer Maternal Grandfather     Heart disease Paternal Grandmother     Hyperthyroidism Paternal Grandmother     COPD Paternal Grandmother     Stroke Neg Hx     Thyroid cancer Neg Hx       Current Medications:     Current Outpatient Medications   Medication Sig Dispense Refill    acetaminophen (TYLENOL) 325 mg tablet Take 325 mg by mouth every 6 (six) hours as needed for mild pain        dicyclomine (BENTYL) 10 mg capsule TAKE 1 CAPSULE BY MOUTH 3 TIMES DAILY. 90 capsule 8    glyBURIDE (DIABETA) 2.5 mg tablet TAKE 1 TABLET BY MOUTH EVERY DAY WITH BREAKFAST 30 tablet 1    montelukast (SINGULAIR) 10 mg tablet Take 1 tablet (10 mg total) by mouth daily at bedtime 90 tablet 2    olopatadine HCl (PATADAY) 0.2 % opth drops Apply 0.2 % to eye as needed      pantoprazole (PROTONIX) 40 mg tablet TAKE 1 TABLET BY MOUTH EVERY DAY 30 tablet 11    pseudoephedrine (SUDAFED) 30 mg tablet Take 1 tablet (30 mg total) by mouth every 4 (four) hours as needed for congestion 30 tablet 0    Sodium Fluoride 5000 PPM 1.1 % PSTE BRUSH FOR 2 MINUTES AND DO NOT RINSE      DULoxetine (CYMBALTA) 60 mg delayed release capsule Take 2 capsules (120 mg total) by mouth daily 60 capsule 5    gabapentin (NEURONTIN) 300 mg capsule TAKE 2 CAPSULES (600 MG TOTAL) BY MOUTH DAILY AT BEDTIME 120 capsule 5    magnesium oxide (MAG-OX) 400 mg Take 1 tablet (400 mg total) by mouth daily 30 tablet 0     No current facility-administered medications for this visit.      Allergies:     Allergies   Allergen Reactions    Nuts - Food Allergy Facial Swelling and Swelling     Other reaction(s): swollen tongue    Other Hives, Itching and Swelling     Sneezing, itchy eyes  Other reaction(s): swollen tongue  Pt is allergic to dogs  Beer    Desogestrel-Ethinyl Estradiol Hives    Pineapple - Food Allergy Tongue Swelling    Dog Epithelium (Canis  Lupus Familiaris) Itching     Other reaction(s): sneezing, itchy eyes    Dust Mite Extract Itching    Ortho Tri-Cyclen (28) [Norgestimate-Eth Estradiol] Other (See Comments)     Burning      Physical Exam:     /82 (BP Location: Left arm, Patient Position: Sitting, Cuff Size: Large)   Pulse 81   Temp 97.5 °F (36.4 °C) (Temporal)   Resp 18   Wt 114 kg (252 lb 3.2 oz)   SpO2 97%   BMI 41.97 kg/m²     Physical Exam  Constitutional:       Appearance: She is normal weight.   HENT:      Head: Normocephalic.      Right Ear: External ear normal.      Left Ear: External ear normal.   Eyes:      Extraocular Movements: Extraocular movements intact.      Conjunctiva/sclera: Conjunctivae normal.      Pupils: Pupils are equal, round, and reactive to light.   Cardiovascular:      Rate and Rhythm: Normal rate and regular rhythm.   Pulmonary:      Effort: Pulmonary effort is normal.      Breath sounds: Normal breath sounds.   Abdominal:      General: Bowel sounds are normal.      Palpations: Abdomen is soft.   Neurological:      Mental Status: She is oriented to person, place, and time.   Psychiatric:         Mood and Affect: Mood normal.         Behavior: Behavior normal.          Saranya Anderson DO  [facility] [place]

## 2024-03-16 ENCOUNTER — HOSPITAL ENCOUNTER (EMERGENCY)
Facility: HOSPITAL | Age: 49
Discharge: HOME | End: 2024-03-16
Attending: INTERNAL MEDICINE

## 2024-03-16 VITALS
TEMPERATURE: 98.4 F | SYSTOLIC BLOOD PRESSURE: 142 MMHG | WEIGHT: 249.6 LBS | RESPIRATION RATE: 18 BRPM | OXYGEN SATURATION: 99 % | BODY MASS INDEX: 41.54 KG/M2 | HEART RATE: 109 BPM | DIASTOLIC BLOOD PRESSURE: 92 MMHG

## 2024-03-16 DIAGNOSIS — T78.40XA ALLERGIC REACTION, INITIAL ENCOUNTER: Primary | ICD-10-CM

## 2024-03-16 PROCEDURE — 99283 EMERGENCY DEPT VISIT LOW MDM: CPT

## 2024-03-16 PROCEDURE — 96375 TX/PRO/DX INJ NEW DRUG ADDON: CPT

## 2024-03-16 PROCEDURE — 96374 THER/PROPH/DIAG INJ IV PUSH: CPT

## 2024-03-16 PROCEDURE — 99284 EMERGENCY DEPT VISIT MOD MDM: CPT | Performed by: INTERNAL MEDICINE

## 2024-03-16 RX ORDER — FAMOTIDINE 10 MG/ML
20 INJECTION, SOLUTION INTRAVENOUS ONCE
Status: COMPLETED | OUTPATIENT
Start: 2024-03-16 | End: 2024-03-16

## 2024-03-16 RX ORDER — PREDNISONE 50 MG/1
50 TABLET ORAL DAILY
Qty: 5 TABLET | Refills: 0 | Status: SHIPPED | OUTPATIENT
Start: 2024-03-16 | End: 2024-03-21

## 2024-03-16 RX ORDER — METHYLPREDNISOLONE SODIUM SUCCINATE 125 MG/2ML
125 INJECTION, POWDER, LYOPHILIZED, FOR SOLUTION INTRAMUSCULAR; INTRAVENOUS ONCE
Status: COMPLETED | OUTPATIENT
Start: 2024-03-16 | End: 2024-03-16

## 2024-03-16 RX ORDER — DIPHENHYDRAMINE HYDROCHLORIDE 50 MG/ML
50 INJECTION INTRAMUSCULAR; INTRAVENOUS ONCE
Status: COMPLETED | OUTPATIENT
Start: 2024-03-16 | End: 2024-03-16

## 2024-03-16 RX ADMIN — FAMOTIDINE 20 MG: 10 INJECTION, SOLUTION INTRAVENOUS at 17:02

## 2024-03-16 RX ADMIN — DIPHENHYDRAMINE HYDROCHLORIDE 50 MG: 50 INJECTION, SOLUTION INTRAMUSCULAR; INTRAVENOUS at 17:01

## 2024-03-16 RX ADMIN — METHYLPREDNISOLONE SODIUM SUCCINATE 125 MG: 125 INJECTION, POWDER, FOR SOLUTION INTRAMUSCULAR; INTRAVENOUS at 17:00

## 2024-03-16 NOTE — ED PROVIDER NOTES
History  Chief Complaint   Patient presents with    Allergic Reaction     Lip swelling onset 1430 today now spreading, took 50mg of benadryl, patient has been getting hives for several months and seen by dermotolgy     This is a 48 years old came for having allergic reaction.  Patient stated that around 2:30 PM she found her lips are swollen and she has flushing of the face and itching all over the body and hives.  The hives on the chest wall and on her back.  Patient has hives since January and she is followed by dermatologist.  Patient started on Zyrtec in the morning and Allegra at p.m. and she does keep increased after she take 2 Zyrtec in the morning to regular p.m. patient denies any respiratory distress.  Patient has no wheezing.  Patient has history of diabetes arthritis and fibromyalgia.  Patient denies taking any new medication, food, detergent, denies any new events.  Patient complaining of lower lip swelling.        Prior to Admission Medications   Prescriptions Last Dose Informant Patient Reported? Taking?   DULoxetine (CYMBALTA) 60 mg delayed release capsule   No No   Sig: TAKE 2 CAPSULES BY MOUTH DAILY   Sodium Fluoride 5000 PPM 1.1 % PSTE   Yes No   Sig: BRUSH FOR 2 MINUTES AND DO NOT RINSE   acetaminophen (TYLENOL) 325 mg tablet  Self Yes No   Sig: Take 325 mg by mouth every 6 (six) hours as needed for mild pain     dicyclomine (BENTYL) 10 mg capsule  Self No No   Sig: TAKE 1 CAPSULE BY MOUTH 3 TIMES DAILY.   gabapentin (NEURONTIN) 300 mg capsule   No No   Sig: Take 2 capsules (600 mg total) by mouth daily at bedtime   glyBURIDE (DIABETA) 2.5 mg tablet   No No   Sig: TAKE 1 TABLET BY MOUTH EVERY DAY WITH BREAKFAST   magnesium oxide (MAG-OX) 400 mg   No No   Sig: Take 1 tablet (400 mg total) by mouth daily   montelukast (SINGULAIR) 10 mg tablet   No No   Sig: Take 1 tablet (10 mg total) by mouth daily at bedtime   olopatadine HCl (PATADAY) 0.2 % opth drops  Self Yes No   Sig: Apply 0.2 % to eye as  needed   pantoprazole (PROTONIX) 40 mg tablet   No No   Sig: TAKE 1 TABLET BY MOUTH EVERY DAY   pseudoephedrine (SUDAFED) 30 mg tablet   No No   Sig: Take 1 tablet (30 mg total) by mouth every 4 (four) hours as needed for congestion      Facility-Administered Medications: None       Past Medical History:   Diagnosis Date    Arthritis     Diabetes 1.5, managed as type 2 (HCC)     Diabetes mellitus (HCC)     Fibromyalgia     Hiatal hernia     HPV (human papilloma virus) infection     Irritable bowel syndrome     Lactose intolerance     Prediabetes        Past Surgical History:   Procedure Laterality Date    ADENOIDECTOMY      CERVICAL BIOPSY       SECTION      COLONOSCOPY      IR BIOPSY BONE MARROW  2022    NE ESOPHAGOGASTRODUODENOSCOPY TRANSORAL DIAGNOSTIC N/A 2017    Procedure: EGD AND COLONOSCOPY;  Surgeon: Devante Fountain MD;  Location: [facility];  Service: Gastroenterology    NE LAPS SURG CHOLECYSTECTOMY W/CHOLANGIOGRAPHY N/A 2018    Procedure: LAPAROSCOPIC CHOLECYSTECTOMY WITH IOC;  Surgeon: Jewel Bro MD;  Location: [facility];  Service: General    TONSILLECTOMY      TUBAL LIGATION         Family History   Problem Relation Age of Onset    Arthritis Mother     Fibromyalgia Mother     Endometriosis Mother     Other Mother         Eye pressure, gallbladder removal    Hypertension Father     Diabetes Father     Diabetes type II Father     Other Father         Gallbladder removal     Pancreatic cancer Maternal Uncle     Esophageal cancer Maternal Grandfather     Heart disease Paternal Grandmother     Hyperthyroidism Paternal Grandmother     COPD Paternal Grandmother     Stroke Neg Hx     Thyroid cancer Neg Hx      I have reviewed and agree with the history as documented.    E-Cigarette/Vaping    E-Cigarette Use Never User      E-Cigarette/Vaping Substances    Nicotine No     THC No     CBD No     Flavoring No     Other No     Unknown No      Social History     Tobacco Use    Smoking  status: Every Day     Current packs/day: 1.50     Types: Cigarettes    Smokeless tobacco: Never   Vaping Use    Vaping status: Never Used   Substance Use Topics    Alcohol use: Not Currently     Alcohol/week: 1.0 standard drink of alcohol     Types: 1 Glasses of wine per week     Comment: does not currently drink ETOH    Drug use: No       Review of Systems   Constitutional:  Negative for fatigue and fever.   HENT:  Positive for facial swelling. Negative for congestion, sinus pressure, sinus pain, sneezing, sore throat and tinnitus.    Respiratory:  Negative for cough and shortness of breath.    Cardiovascular:  Negative for chest pain and palpitations.   Gastrointestinal:  Negative for abdominal pain, diarrhea, nausea and vomiting.   Genitourinary:  Negative for difficulty urinating, dysuria, flank pain and frequency.   Musculoskeletal:  Negative for back pain, myalgias, neck pain and neck stiffness.   Skin:  Positive for rash. Negative for color change and pallor.   Neurological:  Negative for dizziness, light-headedness and headaches.   Psychiatric/Behavioral:  Negative for agitation and behavioral problems.        Physical Exam  Physical Exam  Vitals and nursing note reviewed.   Constitutional:       General: She is not in acute distress.     Appearance: She is well-developed. She is not ill-appearing, toxic-appearing or diaphoretic.   HENT:      Head: Normocephalic and atraumatic.      Right Ear: Tympanic membrane and ear canal normal.      Left Ear: Tympanic membrane and ear canal normal.      Nose: Nose normal. No congestion or rhinorrhea.      Mouth/Throat:      Pharynx: No oropharyngeal exudate or posterior oropharyngeal erythema.      Comments: Has a swollen lower lip.  No swollen tongue.  Oropharynx is patent.  Neck:      Vascular: No carotid bruit.   Cardiovascular:      Rate and Rhythm: Normal rate and regular rhythm.      Heart sounds: Normal heart sounds. No murmur heard.     No friction rub. No  gallop.   Pulmonary:      Effort: Pulmonary effort is normal. No respiratory distress.      Breath sounds: Normal breath sounds. No wheezing, rhonchi or rales.   Abdominal:      General: Bowel sounds are normal. There is no distension.      Palpations: Abdomen is soft. There is no mass.      Tenderness: There is no abdominal tenderness. There is no right CVA tenderness, left CVA tenderness, guarding or rebound.      Hernia: No hernia is present.   Musculoskeletal:         General: No swelling, tenderness, deformity or signs of injury. Normal range of motion.      Cervical back: Normal range of motion and neck supple. No rigidity or tenderness.      Right lower leg: No edema.      Left lower leg: No edema.   Lymphadenopathy:      Cervical: No cervical adenopathy.   Skin:     General: Skin is warm and dry.      Capillary Refill: Capillary refill takes less than 2 seconds.      Coloration: Skin is not jaundiced or pale.      Findings: Rash present. No bruising, erythema or lesion.      Comments: Has hives on the chest wall and on the back.  The face is flushed.   Neurological:      Mental Status: She is alert and oriented to person, place, and time.   Psychiatric:         Behavior: Behavior normal.         Vital Signs  ED Triage Vitals [03/16/24 1626]   Temperature Pulse Respirations Blood Pressure SpO2   98.4 °F (36.9 °C) (!) 109 18 142/92 99 %      Temp Source Heart Rate Source Patient Position - Orthostatic VS BP Location FiO2 (%)   Oral Monitor Sitting Left arm --      Pain Score       --           Vitals:    03/16/24 1626   BP: 142/92   Pulse: (!) 109   Patient Position - Orthostatic VS: Sitting         Visual Acuity      ED Medications  Medications   diphenhydrAMINE (BENADRYL) injection 50 mg (50 mg Intravenous Given 3/16/24 1701)   methylPREDNISolone sodium succinate (Solu-MEDROL) injection 125 mg (125 mg Intravenous Given 3/16/24 1700)   Famotidine (PF) (PEPCID) injection 20 mg (20 mg Intravenous Given 3/16/24  1702)       Diagnostic Studies  Results Reviewed       None                   No orders to display              Procedures  Procedures         ED Course                                             Medical Decision Making  This is a 48 years old with history of hives since January.  Patient is followed by dermatologist.  Provide antihistaminics and eventually dermatologist instructed him to take Zyrtec a.m. and Allegra p.m.  The dose Increased up to 2 tab zyretec AM and 2 tab Allegera PM.  Patient came with swollen lower lip, facial flushed, and hives on the chest wall and on the back.  Patient received Benadryl, Solu-Medrol, Pepcid the lip swelling is decreased and flushing decreased.  Case discussed with the patient and told him clinical fever because of prednisone for 5 days and to follow-up with her dermatologist.  All question concerns of patient have been fully addressed.    Risk  Prescription drug management.             Disposition  Final diagnoses:   Allergic reaction, initial encounter     Time reflects when diagnosis was documented in both MDM as applicable and the Disposition within this note       Time User Action Codes Description Comment    3/16/2024  5:36 PM Mami Dang Add [T78.40XA] Allergic reaction, initial encounter           ED Disposition       ED Disposition   Discharge    Condition   Stable    Date/Time   Sat Mar 16, 2024  5:36 PM    Comment   Chani Elkins discharge to home/self care.                   Follow-up Information       Follow up With Specialties Details Why Contact Yunier Anderson, DO Family Medicine  As needed [address] [place] [address] [id number] [phone]        In 3 days  follow up with your Dermatologist.            Discharge Medication List as of 3/16/2024  5:39 PM        START taking these medications    Details   predniSONE 50 mg tablet Take 1 tablet (50 mg total) by mouth daily for 5 days, Starting Sat 3/16/2024, Until Thu  3/21/2024, Normal           CONTINUE these medications which have NOT CHANGED    Details   acetaminophen (TYLENOL) 325 mg tablet Take 325 mg by mouth every 6 (six) hours as needed for mild pain  , Historical Med      dicyclomine (BENTYL) 10 mg capsule TAKE 1 CAPSULE BY MOUTH 3 TIMES DAILY., Normal      DULoxetine (CYMBALTA) 60 mg delayed release capsule TAKE 2 CAPSULES BY MOUTH DAILY, Normal      gabapentin (NEURONTIN) 300 mg capsule Take 2 capsules (600 mg total) by mouth daily at bedtime, Starting Tue 3/5/2024, Normal      glyBURIDE (DIABETA) 2.5 mg tablet TAKE 1 TABLET BY MOUTH EVERY DAY WITH BREAKFAST, Normal      magnesium oxide (MAG-OX) 400 mg Take 1 tablet (400 mg total) by mouth daily, Starting Fri 4/8/2022, Normal      montelukast (SINGULAIR) 10 mg tablet Take 1 tablet (10 mg total) by mouth daily at bedtime, Starting Tue 3/5/2024, Normal      olopatadine HCl (PATADAY) 0.2 % opth drops Apply 0.2 % to eye as needed, Starting Fri 2/6/2015, Historical Med      pantoprazole (PROTONIX) 40 mg tablet TAKE 1 TABLET BY MOUTH EVERY DAY, Normal      pseudoephedrine (SUDAFED) 30 mg tablet Take 1 tablet (30 mg total) by mouth every 4 (four) hours as needed for congestion, Starting Wed 12/14/2022, Normal      Sodium Fluoride 5000 PPM 1.1 % PSTE BRUSH FOR 2 MINUTES AND DO NOT RINSE, Historical Med             No discharge procedures on file.    PDMP Review       None            ED Provider  Electronically Signed by             Mami Dang MD  03/17/24 0693

## 2024-03-16 NOTE — DISCHARGE INSTRUCTIONS
Take medications as prescribed  Follow up with your demtatologist.  Watch what you eat and what you drink.

## 2024-03-16 NOTE — ED NOTES
"Pt verbalized feeling better and less \"tightness on my face\"      Jessica Lambert RN  03/16/24 [id number]    "

## 2024-03-25 DIAGNOSIS — M79.7 FIBROMYALGIA: ICD-10-CM

## 2024-03-26 DIAGNOSIS — M79.7 FIBROMYALGIA: ICD-10-CM

## 2024-03-26 RX ORDER — DULOXETIN HYDROCHLORIDE 60 MG/1
120 CAPSULE, DELAYED RELEASE ORAL DAILY
Qty: 60 CAPSULE | Refills: 5 | Status: SHIPPED | OUTPATIENT
Start: 2024-03-26

## 2024-03-26 RX ORDER — GABAPENTIN 300 MG/1
600 CAPSULE ORAL
Qty: 120 CAPSULE | Refills: 5 | Status: SHIPPED | OUTPATIENT
Start: 2024-03-26

## 2024-05-14 NOTE — PROGRESS NOTES
Left voicemail for patient to check in as she never responded to last mychart message. Also sent a mychart message. Advised for her to respond on mychart or call back for ongoing skin issues.

## 2024-05-16 DIAGNOSIS — T78.3XXA ANGIOEDEMA, INITIAL ENCOUNTER: Primary | ICD-10-CM

## 2024-05-16 RX ORDER — EPINEPHRINE 0.3 MG/.3ML
0.3 INJECTION SUBCUTANEOUS ONCE
Qty: 0.6 ML | Refills: 0 | Status: SHIPPED | OUTPATIENT
Start: 2024-05-16 | End: 2024-05-16

## 2024-07-04 DIAGNOSIS — K29.60 EROSIVE GASTRITIS: ICD-10-CM

## 2024-07-05 RX ORDER — PANTOPRAZOLE SODIUM 40 MG/1
TABLET, DELAYED RELEASE ORAL
Qty: 30 TABLET | Refills: 11 | Status: SHIPPED | OUTPATIENT
Start: 2024-07-05

## 2024-09-18 ENCOUNTER — TELEPHONE (OUTPATIENT)
Age: 49
End: 2024-09-18

## 2024-10-12 DIAGNOSIS — M79.7 FIBROMYALGIA: ICD-10-CM

## 2024-10-14 RX ORDER — DULOXETIN HYDROCHLORIDE 60 MG/1
120 CAPSULE, DELAYED RELEASE ORAL DAILY
Qty: 60 CAPSULE | Refills: 2 | Status: SHIPPED | OUTPATIENT
Start: 2024-10-14

## 2024-11-18 ENCOUNTER — TELEPHONE (OUTPATIENT)
Age: 49
End: 2024-11-18

## 2024-11-18 NOTE — TELEPHONE ENCOUNTER
Patient called to get a letter faxed to [facility] so they don't turn off her service because of medical reasons. She said the fax number is in her file. Thank you.

## 2024-11-18 NOTE — TELEPHONE ENCOUNTER
Spoke to patient - she stated ok . I explained we don't give medical for gas .. But we did as a courtesy in September but will not do it again.

## 2024-12-11 DIAGNOSIS — T78.40XA ALLERGY, INITIAL ENCOUNTER: ICD-10-CM

## 2024-12-11 RX ORDER — MONTELUKAST SODIUM 10 MG/1
10 TABLET ORAL
Qty: 90 TABLET | Refills: 1 | Status: SHIPPED | OUTPATIENT
Start: 2024-12-11

## 2025-01-29 NOTE — DISCHARGE INSTRUCTIONS
Return to the ER if you develop:    Worsening rash, fevers, vomiting, abdominal pain, wheezing, chest pain, shortness of breath.  
actual/standing

## 2025-05-16 NOTE — TELEPHONE ENCOUNTER
Attempted to reach patient to schedule liver disease f/u and labs with TAM Serrano per in basket and recall. LVM requesting patient call clinic back to schedule. Sending patient portal message.    Pended med for 60qd which ins will cover, please sign off if approved

## 2025-06-07 DIAGNOSIS — M79.7 FIBROMYALGIA: ICD-10-CM

## 2025-06-09 RX ORDER — DULOXETIN HYDROCHLORIDE 60 MG/1
120 CAPSULE, DELAYED RELEASE ORAL DAILY
Qty: 60 CAPSULE | Refills: 2 | OUTPATIENT
Start: 2025-06-09

## 2025-06-24 DIAGNOSIS — T78.40XA ALLERGY, INITIAL ENCOUNTER: ICD-10-CM

## 2025-06-24 RX ORDER — MONTELUKAST SODIUM 10 MG/1
10 TABLET ORAL
Qty: 90 TABLET | Refills: 1 | OUTPATIENT
Start: 2025-06-24

## 2025-06-26 ENCOUNTER — TELEPHONE (OUTPATIENT)
Age: 50
End: 2025-06-26

## 2025-07-25 DIAGNOSIS — M79.7 FIBROMYALGIA: ICD-10-CM

## 2025-07-25 DIAGNOSIS — T78.40XA ALLERGY, INITIAL ENCOUNTER: ICD-10-CM

## 2025-07-25 DIAGNOSIS — K29.60 EROSIVE GASTRITIS: ICD-10-CM

## 2025-07-25 RX ORDER — PANTOPRAZOLE SODIUM 40 MG/1
40 TABLET, DELAYED RELEASE ORAL DAILY
Qty: 30 TABLET | Refills: 11 | Status: SHIPPED | OUTPATIENT
Start: 2025-07-25

## 2025-07-25 RX ORDER — DULOXETIN HYDROCHLORIDE 60 MG/1
120 CAPSULE, DELAYED RELEASE ORAL DAILY
Qty: 60 CAPSULE | Refills: 2 | Status: SHIPPED | OUTPATIENT
Start: 2025-07-25

## 2025-07-25 RX ORDER — MONTELUKAST SODIUM 10 MG/1
10 TABLET ORAL
Qty: 90 TABLET | Refills: 1 | Status: SHIPPED | OUTPATIENT
Start: 2025-07-25

## (undated) DEVICE — INTENDED FOR TISSUE SEPARATION, AND OTHER PROCEDURES THAT REQUIRE A SHARP SURGICAL BLADE TO PUNCTURE OR CUT.: Brand: BARD-PARKER SAFETY BLADES SIZE 15, STERILE

## (undated) DEVICE — GAUZE SPONGES,8 PLY: Brand: CURITY

## (undated) DEVICE — [HIGH FLOW INSUFFLATOR,  DO NOT USE IF PACKAGE IS DAMAGED,  KEEP DRY,  KEEP AWAY FROM SUNLIGHT,  PROTECT FROM HEAT AND RADIOACTIVE SOURCES.]: Brand: PNEUMOSURE

## (undated) DEVICE — 3M™ TEGADERM™ TRANSPARENT FILM DRESSING FRAME STYLE, 1624W, 2-3/8 IN X 2-3/4 IN (6 CM X 7 CM), 100/CT 4CT/CASE: Brand: 3M™ TEGADERM™

## (undated) DEVICE — IRRIG ENDO FLO TUBING

## (undated) DEVICE — GLOVE SRG BIOGEL ECLIPSE 7.5

## (undated) DEVICE — HYDROPHILIC WOUND DRESSING WITH ZINC PLUS VITAMINS A AND B6.: Brand: DERMAGRAN®-B

## (undated) DEVICE — LIGAMAX 5 MM ENDOSCOPIC MULTIPLE CLIP APPLIER: Brand: LIGAMAX

## (undated) DEVICE — ENDOPOUCH RETRIEVER SPECIMEN RETRIEVAL BAGS: Brand: ENDOPOUCH RETRIEVER

## (undated) DEVICE — CHLORAPREP HI-LITE 26ML ORANGE

## (undated) DEVICE — ALLENTOWN LAP CHOLE APP PACK: Brand: CARDINAL HEALTH

## (undated) DEVICE — SYRINGE 10ML LL

## (undated) DEVICE — DRAPE C-ARM X-RAY

## (undated) DEVICE — ENDOPATH XCEL UNIVERSAL TROCAR STABLILITY SLEEVES: Brand: ENDOPATH XCEL

## (undated) DEVICE — 5 MM CURVED DISSECTORS WITH MONOPOLAR CAUTERY: Brand: ENDOPATH

## (undated) DEVICE — ENDOPATH XCEL BLADELESS TROCARS WITH STABILITY SLEEVES: Brand: ENDOPATH XCEL

## (undated) DEVICE — SUT VICRYL 4-0 PS-2 27 IN J426H

## (undated) DEVICE — WORKING LENGTH 235CM, WORKING CHANNEL 2.8MM: Brand: RESOLUTION 360 CLIP

## (undated) DEVICE — IV CATH 14 G X 1.75

## (undated) DEVICE — 3M™ STERI-STRIP™ REINFORCED ADHESIVE SKIN CLOSURES, R1546, 1/4 IN X 4 IN (6 MM X 100 MM), 10 STRIPS/ENVELOPE: Brand: 3M™ STERI-STRIP™

## (undated) DEVICE — ELECTRODE LAP L WIRE E-Z CLEAN 33CM -0100

## (undated) DEVICE — GLOVE INDICATOR PI UNDERGLOVE SZ 7.5 BLUE

## (undated) DEVICE — CHOLE CATH KIT ARROW

## (undated) DEVICE — REM POLYHESIVE ADULT PATIENT RETURN ELECTRODE: Brand: VALLEYLAB

## (undated) DEVICE — SCD SEQUENTIAL COMPRESSION COMFORT SLEEVE MEDIUM KNEE LENGTH: Brand: KENDALL SCD

## (undated) DEVICE — LIGHT HANDLE COVER SLEEVE DISP BLUE STELLAR